# Patient Record
Sex: FEMALE | Race: WHITE | NOT HISPANIC OR LATINO | Employment: OTHER | URBAN - METROPOLITAN AREA
[De-identification: names, ages, dates, MRNs, and addresses within clinical notes are randomized per-mention and may not be internally consistent; named-entity substitution may affect disease eponyms.]

---

## 2017-01-21 ENCOUNTER — GENERIC CONVERSION - ENCOUNTER (OUTPATIENT)
Dept: OTHER | Facility: OTHER | Age: 82
End: 2017-01-21

## 2017-02-04 ENCOUNTER — HOSPITAL ENCOUNTER (INPATIENT)
Facility: HOSPITAL | Age: 82
LOS: 9 days | Discharge: HOME HEALTH | DRG: 327 | End: 2017-02-13
Attending: SURGERY | Admitting: SURGERY
Payer: MEDICARE

## 2017-02-04 ENCOUNTER — APPOINTMENT (EMERGENCY)
Dept: CT IMAGING | Facility: HOSPITAL | Age: 82
DRG: 327 | End: 2017-02-04
Payer: MEDICARE

## 2017-02-04 ENCOUNTER — APPOINTMENT (EMERGENCY)
Dept: RADIOLOGY | Facility: HOSPITAL | Age: 82
DRG: 327 | End: 2017-02-04
Payer: MEDICARE

## 2017-02-04 ENCOUNTER — GENERIC CONVERSION - ENCOUNTER (OUTPATIENT)
Dept: OTHER | Facility: OTHER | Age: 82
End: 2017-02-04

## 2017-02-04 ENCOUNTER — APPOINTMENT (INPATIENT)
Dept: RADIOLOGY | Facility: HOSPITAL | Age: 82
DRG: 327 | End: 2017-02-04
Payer: MEDICARE

## 2017-02-04 DIAGNOSIS — K44.9 DIAPHRAGMATIC HERNIA: ICD-10-CM

## 2017-02-04 DIAGNOSIS — K31.89 GASTRIC VOLVULUS: ICD-10-CM

## 2017-02-04 DIAGNOSIS — K44.9 PARAESOPHAGEAL HERNIA: ICD-10-CM

## 2017-02-04 DIAGNOSIS — I10 ESSENTIAL HYPERTENSION: Primary | ICD-10-CM

## 2017-02-04 DIAGNOSIS — R10.9 ABDOMINAL PAIN: ICD-10-CM

## 2017-02-04 LAB
ANION GAP SERPL CALCULATED.3IONS-SCNC: 9 MMOL/L (ref 4–13)
BASOPHILS # BLD AUTO: 0.03 THOUSANDS/ΜL (ref 0–0.1)
BASOPHILS NFR BLD AUTO: 0 % (ref 0–1)
BUN SERPL-MCNC: 24 MG/DL (ref 5–25)
CALCIUM SERPL-MCNC: 9.4 MG/DL (ref 8.3–10.1)
CHLORIDE SERPL-SCNC: 104 MMOL/L (ref 100–108)
CO2 SERPL-SCNC: 28 MMOL/L (ref 21–32)
CREAT SERPL-MCNC: 1.43 MG/DL (ref 0.6–1.3)
EOSINOPHIL # BLD AUTO: 0.04 THOUSAND/ΜL (ref 0–0.61)
EOSINOPHIL NFR BLD AUTO: 1 % (ref 0–6)
ERYTHROCYTE [DISTWIDTH] IN BLOOD BY AUTOMATED COUNT: 14.8 % (ref 11.6–15.1)
FLUAV AG SPEC QL IA: NEGATIVE
FLUBV AG SPEC QL IA: NEGATIVE
GFR SERPL CREATININE-BSD FRML MDRD: 34.6 ML/MIN/1.73SQ M
GLUCOSE SERPL-MCNC: 119 MG/DL (ref 65–140)
HCT VFR BLD AUTO: 43.7 % (ref 34.8–46.1)
HGB BLD-MCNC: 14.1 G/DL (ref 11.5–15.4)
LACTATE SERPL-SCNC: 0.9 MMOL/L (ref 0.5–2)
LYMPHOCYTES # BLD AUTO: 0.68 THOUSANDS/ΜL (ref 0.6–4.47)
LYMPHOCYTES NFR BLD AUTO: 9 % (ref 14–44)
MCH RBC QN AUTO: 29.4 PG (ref 26.8–34.3)
MCHC RBC AUTO-ENTMCNC: 32.3 G/DL (ref 31.4–37.4)
MCV RBC AUTO: 91 FL (ref 82–98)
MONOCYTES # BLD AUTO: 0.51 THOUSAND/ΜL (ref 0.17–1.22)
MONOCYTES NFR BLD AUTO: 7 % (ref 4–12)
NEUTROPHILS # BLD AUTO: 6.51 THOUSANDS/ΜL (ref 1.85–7.62)
NEUTS SEG NFR BLD AUTO: 83 % (ref 43–75)
PLATELET # BLD AUTO: 184 THOUSANDS/UL (ref 149–390)
PLATELET # BLD AUTO: 196 THOUSANDS/UL (ref 149–390)
PMV BLD AUTO: 10 FL (ref 8.9–12.7)
PMV BLD AUTO: 9.8 FL (ref 8.9–12.7)
POTASSIUM SERPL-SCNC: 3.9 MMOL/L (ref 3.5–5.3)
RBC # BLD AUTO: 4.8 MILLION/UL (ref 3.81–5.12)
SODIUM SERPL-SCNC: 141 MMOL/L (ref 136–145)
TROPONIN I SERPL-MCNC: <0.02 NG/ML
WBC # BLD AUTO: 7.77 THOUSAND/UL (ref 4.31–10.16)

## 2017-02-04 PROCEDURE — 74176 CT ABD & PELVIS W/O CONTRAST: CPT

## 2017-02-04 PROCEDURE — 85049 AUTOMATED PLATELET COUNT: CPT | Performed by: SURGERY

## 2017-02-04 PROCEDURE — 93005 ELECTROCARDIOGRAM TRACING: CPT | Performed by: PHYSICIAN ASSISTANT

## 2017-02-04 PROCEDURE — 36415 COLL VENOUS BLD VENIPUNCTURE: CPT | Performed by: PHYSICIAN ASSISTANT

## 2017-02-04 PROCEDURE — 83605 ASSAY OF LACTIC ACID: CPT | Performed by: EMERGENCY MEDICINE

## 2017-02-04 PROCEDURE — 99285 EMERGENCY DEPT VISIT HI MDM: CPT

## 2017-02-04 PROCEDURE — 87400 INFLUENZA A/B EACH AG IA: CPT | Performed by: PHYSICIAN ASSISTANT

## 2017-02-04 PROCEDURE — 96375 TX/PRO/DX INJ NEW DRUG ADDON: CPT

## 2017-02-04 PROCEDURE — 74000 HB X-RAY EXAM OF ABDOMEN (SINGLE ANTEROPOSTERIOR VIEW): CPT

## 2017-02-04 PROCEDURE — 96361 HYDRATE IV INFUSION ADD-ON: CPT

## 2017-02-04 PROCEDURE — 71020 HB CHEST X-RAY 2VW FRONTAL&LATL: CPT

## 2017-02-04 PROCEDURE — 85025 COMPLETE CBC W/AUTO DIFF WBC: CPT | Performed by: PHYSICIAN ASSISTANT

## 2017-02-04 PROCEDURE — 70450 CT HEAD/BRAIN W/O DYE: CPT

## 2017-02-04 PROCEDURE — 80048 BASIC METABOLIC PNL TOTAL CA: CPT | Performed by: PHYSICIAN ASSISTANT

## 2017-02-04 PROCEDURE — 96374 THER/PROPH/DIAG INJ IV PUSH: CPT

## 2017-02-04 PROCEDURE — 84484 ASSAY OF TROPONIN QUANT: CPT | Performed by: PHYSICIAN ASSISTANT

## 2017-02-04 PROCEDURE — 87798 DETECT AGENT NOS DNA AMP: CPT | Performed by: PHYSICIAN ASSISTANT

## 2017-02-04 RX ORDER — ONDANSETRON 2 MG/ML
INJECTION INTRAMUSCULAR; INTRAVENOUS
Status: DISPENSED
Start: 2017-02-04 | End: 2017-02-05

## 2017-02-04 RX ORDER — PANTOPRAZOLE SODIUM 40 MG/1
40 INJECTION, POWDER, FOR SOLUTION INTRAVENOUS
Status: DISCONTINUED | OUTPATIENT
Start: 2017-02-05 | End: 2017-02-13 | Stop reason: HOSPADM

## 2017-02-04 RX ORDER — HEPARIN SODIUM 5000 [USP'U]/ML
5000 INJECTION, SOLUTION INTRAVENOUS; SUBCUTANEOUS EVERY 8 HOURS SCHEDULED
Status: DISCONTINUED | OUTPATIENT
Start: 2017-02-04 | End: 2017-02-13 | Stop reason: HOSPADM

## 2017-02-04 RX ORDER — ONDANSETRON 2 MG/ML
INJECTION INTRAMUSCULAR; INTRAVENOUS
Status: COMPLETED
Start: 2017-02-04 | End: 2017-02-04

## 2017-02-04 RX ORDER — ONDANSETRON HYDROCHLORIDE 4 MG/5ML
4 SOLUTION ORAL ONCE
Status: COMPLETED | OUTPATIENT
Start: 2017-02-04 | End: 2017-02-04

## 2017-02-04 RX ORDER — PANTOPRAZOLE SODIUM 40 MG/1
40 TABLET, DELAYED RELEASE ORAL DAILY
COMMUNITY
End: 2017-07-09 | Stop reason: HOSPADM

## 2017-02-04 RX ORDER — LABETALOL HYDROCHLORIDE 5 MG/ML
10 INJECTION, SOLUTION INTRAVENOUS EVERY 6 HOURS PRN
Status: DISCONTINUED | OUTPATIENT
Start: 2017-02-04 | End: 2017-02-05

## 2017-02-04 RX ORDER — MIDAZOLAM HYDROCHLORIDE 1 MG/ML
1 INJECTION INTRAMUSCULAR; INTRAVENOUS ONCE
Status: COMPLETED | OUTPATIENT
Start: 2017-02-04 | End: 2017-02-04

## 2017-02-04 RX ORDER — ONDANSETRON 2 MG/ML
4 INJECTION INTRAMUSCULAR; INTRAVENOUS EVERY 4 HOURS PRN
Status: DISCONTINUED | OUTPATIENT
Start: 2017-02-04 | End: 2017-02-13 | Stop reason: HOSPADM

## 2017-02-04 RX ORDER — ONDANSETRON 2 MG/ML
4 INJECTION INTRAMUSCULAR; INTRAVENOUS ONCE
Status: COMPLETED | OUTPATIENT
Start: 2017-02-04 | End: 2017-02-04

## 2017-02-04 RX ORDER — SODIUM CHLORIDE 9 MG/ML
125 INJECTION, SOLUTION INTRAVENOUS CONTINUOUS
Status: DISCONTINUED | OUTPATIENT
Start: 2017-02-04 | End: 2017-02-06

## 2017-02-04 RX ORDER — MORPHINE SULFATE 2 MG/ML
2 INJECTION, SOLUTION INTRAMUSCULAR; INTRAVENOUS ONCE
Status: COMPLETED | OUTPATIENT
Start: 2017-02-04 | End: 2017-02-04

## 2017-02-04 RX ADMIN — ONDANSETRON 4 MG: 4 SOLUTION ORAL at 17:45

## 2017-02-04 RX ADMIN — ONDANSETRON 4 MG: 4 SOLUTION ORAL at 20:19

## 2017-02-04 RX ADMIN — SODIUM CHLORIDE 250 ML: 0.9 INJECTION, SOLUTION INTRAVENOUS at 17:00

## 2017-02-04 RX ADMIN — HEPARIN SODIUM 5000 UNITS: 5000 INJECTION, SOLUTION INTRAVENOUS; SUBCUTANEOUS at 23:47

## 2017-02-04 RX ADMIN — MIDAZOLAM 1 MG: 1 INJECTION INTRAMUSCULAR; INTRAVENOUS at 21:10

## 2017-02-04 RX ADMIN — ONDANSETRON 4 MG: 2 INJECTION INTRAMUSCULAR; INTRAVENOUS at 16:57

## 2017-02-04 RX ADMIN — FAMOTIDINE 20 MG: 10 INJECTION, SOLUTION INTRAVENOUS at 17:08

## 2017-02-04 RX ADMIN — MORPHINE SULFATE 2 MG: 2 INJECTION, SOLUTION INTRAMUSCULAR; INTRAVENOUS at 20:22

## 2017-02-05 PROBLEM — N18.4 CKD (CHRONIC KIDNEY DISEASE) STAGE 4, GFR 15-29 ML/MIN (HCC): Chronic | Status: ACTIVE | Noted: 2017-02-05

## 2017-02-05 PROBLEM — I10 ESSENTIAL HYPERTENSION: Chronic | Status: ACTIVE | Noted: 2017-02-05

## 2017-02-05 PROBLEM — I50.9 CHF (CONGESTIVE HEART FAILURE) (HCC): Chronic | Status: ACTIVE | Noted: 2017-02-05

## 2017-02-05 PROBLEM — I48.91 ATRIAL FIBRILLATION (HCC): Chronic | Status: ACTIVE | Noted: 2017-02-05

## 2017-02-05 PROBLEM — K44.9 PARAESOPHAGEAL HERNIA: Status: ACTIVE | Noted: 2017-02-05

## 2017-02-05 LAB
ANION GAP SERPL CALCULATED.3IONS-SCNC: 8 MMOL/L (ref 4–13)
BASOPHILS # BLD AUTO: 0.03 THOUSANDS/ΜL (ref 0–0.1)
BASOPHILS NFR BLD AUTO: 1 % (ref 0–1)
BUN SERPL-MCNC: 23 MG/DL (ref 5–25)
CALCIUM SERPL-MCNC: 8.2 MG/DL (ref 8.3–10.1)
CHLORIDE SERPL-SCNC: 110 MMOL/L (ref 100–108)
CO2 SERPL-SCNC: 28 MMOL/L (ref 21–32)
CREAT SERPL-MCNC: 1.35 MG/DL (ref 0.6–1.3)
EOSINOPHIL # BLD AUTO: 0.05 THOUSAND/ΜL (ref 0–0.61)
EOSINOPHIL NFR BLD AUTO: 1 % (ref 0–6)
ERYTHROCYTE [DISTWIDTH] IN BLOOD BY AUTOMATED COUNT: 14.7 % (ref 11.6–15.1)
FLUAV AG SPEC QL: NORMAL
FLUBV AG SPEC QL: NORMAL
GFR SERPL CREATININE-BSD FRML MDRD: 36.9 ML/MIN/1.73SQ M
GLUCOSE SERPL-MCNC: 81 MG/DL (ref 65–140)
HCT VFR BLD AUTO: 37.7 % (ref 34.8–46.1)
HGB BLD-MCNC: 11.7 G/DL (ref 11.5–15.4)
LYMPHOCYTES # BLD AUTO: 0.75 THOUSANDS/ΜL (ref 0.6–4.47)
LYMPHOCYTES NFR BLD AUTO: 13 % (ref 14–44)
MAGNESIUM SERPL-MCNC: 2.2 MG/DL (ref 1.6–2.6)
MCH RBC QN AUTO: 28.9 PG (ref 26.8–34.3)
MCHC RBC AUTO-ENTMCNC: 31 G/DL (ref 31.4–37.4)
MCV RBC AUTO: 93 FL (ref 82–98)
MONOCYTES # BLD AUTO: 0.67 THOUSAND/ΜL (ref 0.17–1.22)
MONOCYTES NFR BLD AUTO: 11 % (ref 4–12)
NEUTROPHILS # BLD AUTO: 4.5 THOUSANDS/ΜL (ref 1.85–7.62)
NEUTS SEG NFR BLD AUTO: 74 % (ref 43–75)
PLATELET # BLD AUTO: 157 THOUSANDS/UL (ref 149–390)
PMV BLD AUTO: 9.8 FL (ref 8.9–12.7)
POTASSIUM SERPL-SCNC: 4 MMOL/L (ref 3.5–5.3)
RBC # BLD AUTO: 4.05 MILLION/UL (ref 3.81–5.12)
RSV B RNA SPEC QL NAA+PROBE: NORMAL
SODIUM SERPL-SCNC: 146 MMOL/L (ref 136–145)
WBC # BLD AUTO: 6 THOUSAND/UL (ref 4.31–10.16)

## 2017-02-05 PROCEDURE — 85025 COMPLETE CBC W/AUTO DIFF WBC: CPT | Performed by: SURGERY

## 2017-02-05 PROCEDURE — 80048 BASIC METABOLIC PNL TOTAL CA: CPT | Performed by: SURGERY

## 2017-02-05 PROCEDURE — 83735 ASSAY OF MAGNESIUM: CPT | Performed by: SURGERY

## 2017-02-05 PROCEDURE — C9113 INJ PANTOPRAZOLE SODIUM, VIA: HCPCS | Performed by: SURGERY

## 2017-02-05 RX ADMIN — HEPARIN SODIUM 5000 UNITS: 5000 INJECTION, SOLUTION INTRAVENOUS; SUBCUTANEOUS at 21:24

## 2017-02-05 RX ADMIN — METOPROLOL TARTRATE 5 MG: 5 INJECTION INTRAVENOUS at 17:06

## 2017-02-05 RX ADMIN — HEPARIN SODIUM 5000 UNITS: 5000 INJECTION, SOLUTION INTRAVENOUS; SUBCUTANEOUS at 05:31

## 2017-02-05 RX ADMIN — SODIUM CHLORIDE 125 ML/HR: 0.9 INJECTION, SOLUTION INTRAVENOUS at 23:54

## 2017-02-05 RX ADMIN — METOPROLOL TARTRATE 2.5 MG: 5 INJECTION INTRAVENOUS at 11:40

## 2017-02-05 RX ADMIN — METOPROLOL TARTRATE 5 MG: 5 INJECTION INTRAVENOUS at 21:24

## 2017-02-05 RX ADMIN — HEPARIN SODIUM 5000 UNITS: 5000 INJECTION, SOLUTION INTRAVENOUS; SUBCUTANEOUS at 14:12

## 2017-02-05 RX ADMIN — SODIUM CHLORIDE 125 ML/HR: 0.9 INJECTION, SOLUTION INTRAVENOUS at 15:42

## 2017-02-05 RX ADMIN — PANTOPRAZOLE SODIUM 40 MG: 40 INJECTION, POWDER, FOR SOLUTION INTRAVENOUS at 08:18

## 2017-02-06 ENCOUNTER — APPOINTMENT (INPATIENT)
Dept: RADIOLOGY | Facility: HOSPITAL | Age: 82
DRG: 327 | End: 2017-02-06
Payer: MEDICARE

## 2017-02-06 PROBLEM — E87.0 HYPERNATREMIA: Status: ACTIVE | Noted: 2017-02-06

## 2017-02-06 LAB
ANION GAP SERPL CALCULATED.3IONS-SCNC: 11 MMOL/L (ref 4–13)
BUN SERPL-MCNC: 24 MG/DL (ref 5–25)
CALCIUM SERPL-MCNC: 8.4 MG/DL (ref 8.3–10.1)
CHLORIDE SERPL-SCNC: 112 MMOL/L (ref 100–108)
CO2 SERPL-SCNC: 25 MMOL/L (ref 21–32)
CREAT SERPL-MCNC: 1.43 MG/DL (ref 0.6–1.3)
ERYTHROCYTE [DISTWIDTH] IN BLOOD BY AUTOMATED COUNT: 14.9 % (ref 11.6–15.1)
GFR SERPL CREATININE-BSD FRML MDRD: 34.6 ML/MIN/1.73SQ M
GLUCOSE SERPL-MCNC: 59 MG/DL (ref 65–140)
GLUCOSE SERPL-MCNC: 76 MG/DL (ref 65–140)
HCT VFR BLD AUTO: 39.8 % (ref 34.8–46.1)
HGB BLD-MCNC: 11.9 G/DL (ref 11.5–15.4)
MAGNESIUM SERPL-MCNC: 2.2 MG/DL (ref 1.6–2.6)
MCH RBC QN AUTO: 28.5 PG (ref 26.8–34.3)
MCHC RBC AUTO-ENTMCNC: 29.9 G/DL (ref 31.4–37.4)
MCV RBC AUTO: 95 FL (ref 82–98)
PLATELET # BLD AUTO: 160 THOUSANDS/UL (ref 149–390)
PMV BLD AUTO: 10 FL (ref 8.9–12.7)
POTASSIUM SERPL-SCNC: 4 MMOL/L (ref 3.5–5.3)
RBC # BLD AUTO: 4.17 MILLION/UL (ref 3.81–5.12)
SODIUM SERPL-SCNC: 148 MMOL/L (ref 136–145)
WBC # BLD AUTO: 6.78 THOUSAND/UL (ref 4.31–10.16)

## 2017-02-06 PROCEDURE — 85027 COMPLETE CBC AUTOMATED: CPT | Performed by: SURGERY

## 2017-02-06 PROCEDURE — 80048 BASIC METABOLIC PNL TOTAL CA: CPT | Performed by: SURGERY

## 2017-02-06 PROCEDURE — 74000 HB X-RAY EXAM OF ABDOMEN (SINGLE ANTEROPOSTERIOR VIEW): CPT

## 2017-02-06 PROCEDURE — C9113 INJ PANTOPRAZOLE SODIUM, VIA: HCPCS | Performed by: SURGERY

## 2017-02-06 PROCEDURE — 83735 ASSAY OF MAGNESIUM: CPT | Performed by: SURGERY

## 2017-02-06 PROCEDURE — 82948 REAGENT STRIP/BLOOD GLUCOSE: CPT

## 2017-02-06 RX ORDER — LANOLIN ALCOHOL/MO/W.PET/CERES
3 CREAM (GRAM) TOPICAL
Status: DISCONTINUED | OUTPATIENT
Start: 2017-02-06 | End: 2017-02-07

## 2017-02-06 RX ORDER — DEXTROSE AND SODIUM CHLORIDE 5; .45 G/100ML; G/100ML
84 INJECTION, SOLUTION INTRAVENOUS CONTINUOUS
Status: DISCONTINUED | OUTPATIENT
Start: 2017-02-06 | End: 2017-02-08

## 2017-02-06 RX ORDER — DEXTROSE AND SODIUM CHLORIDE 5; .45 G/100ML; G/100ML
100 INJECTION, SOLUTION INTRAVENOUS CONTINUOUS
Status: DISCONTINUED | OUTPATIENT
Start: 2017-02-06 | End: 2017-02-06

## 2017-02-06 RX ADMIN — MELATONIN TAB 3 MG 3 MG: 3 TAB at 23:26

## 2017-02-06 RX ADMIN — METOPROLOL TARTRATE 5 MG: 5 INJECTION INTRAVENOUS at 09:21

## 2017-02-06 RX ADMIN — SODIUM CHLORIDE 125 ML/HR: 0.9 INJECTION, SOLUTION INTRAVENOUS at 07:33

## 2017-02-06 RX ADMIN — HEPARIN SODIUM 5000 UNITS: 5000 INJECTION, SOLUTION INTRAVENOUS; SUBCUTANEOUS at 22:33

## 2017-02-06 RX ADMIN — METOPROLOL TARTRATE 5 MG: 5 INJECTION INTRAVENOUS at 16:13

## 2017-02-06 RX ADMIN — PANTOPRAZOLE SODIUM 40 MG: 40 INJECTION, POWDER, FOR SOLUTION INTRAVENOUS at 09:22

## 2017-02-06 RX ADMIN — HEPARIN SODIUM 5000 UNITS: 5000 INJECTION, SOLUTION INTRAVENOUS; SUBCUTANEOUS at 06:27

## 2017-02-06 RX ADMIN — METOPROLOL TARTRATE 5 MG: 5 INJECTION INTRAVENOUS at 04:15

## 2017-02-06 RX ADMIN — HEPARIN SODIUM 5000 UNITS: 5000 INJECTION, SOLUTION INTRAVENOUS; SUBCUTANEOUS at 13:06

## 2017-02-06 RX ADMIN — METOPROLOL TARTRATE 5 MG: 5 INJECTION INTRAVENOUS at 22:33

## 2017-02-06 RX ADMIN — DEXTROSE AND SODIUM CHLORIDE 100 ML/HR: 5; .45 INJECTION, SOLUTION INTRAVENOUS at 09:20

## 2017-02-07 PROBLEM — N18.30 CKD (CHRONIC KIDNEY DISEASE) STAGE 3, GFR 30-59 ML/MIN (HCC): Status: ACTIVE | Noted: 2017-02-05

## 2017-02-07 PROBLEM — E87.0 HYPERNATREMIA: Status: RESOLVED | Noted: 2017-02-06 | Resolved: 2017-02-07

## 2017-02-07 PROBLEM — E87.6 HYPOKALEMIA: Status: ACTIVE | Noted: 2017-02-07

## 2017-02-07 LAB
ANION GAP SERPL CALCULATED.3IONS-SCNC: 8 MMOL/L (ref 4–13)
ATRIAL RATE: 108 BPM
BASOPHILS # BLD AUTO: 0.02 THOUSANDS/ΜL (ref 0–0.1)
BASOPHILS NFR BLD AUTO: 0 % (ref 0–1)
BUN SERPL-MCNC: 22 MG/DL (ref 5–25)
CALCIUM SERPL-MCNC: 8.4 MG/DL (ref 8.3–10.1)
CHLORIDE SERPL-SCNC: 107 MMOL/L (ref 100–108)
CO2 SERPL-SCNC: 26 MMOL/L (ref 21–32)
CREAT SERPL-MCNC: 1.35 MG/DL (ref 0.6–1.3)
EOSINOPHIL # BLD AUTO: 0.1 THOUSAND/ΜL (ref 0–0.61)
EOSINOPHIL NFR BLD AUTO: 2 % (ref 0–6)
ERYTHROCYTE [DISTWIDTH] IN BLOOD BY AUTOMATED COUNT: 14.7 % (ref 11.6–15.1)
GFR SERPL CREATININE-BSD FRML MDRD: 36.9 ML/MIN/1.73SQ M
GLUCOSE SERPL-MCNC: 108 MG/DL (ref 65–140)
HCT VFR BLD AUTO: 38.6 % (ref 34.8–46.1)
HGB BLD-MCNC: 11.9 G/DL (ref 11.5–15.4)
LYMPHOCYTES # BLD AUTO: 0.53 THOUSANDS/ΜL (ref 0.6–4.47)
LYMPHOCYTES NFR BLD AUTO: 9 % (ref 14–44)
MAGNESIUM SERPL-MCNC: 2 MG/DL (ref 1.6–2.6)
MCH RBC QN AUTO: 29 PG (ref 26.8–34.3)
MCHC RBC AUTO-ENTMCNC: 30.8 G/DL (ref 31.4–37.4)
MCV RBC AUTO: 94 FL (ref 82–98)
MONOCYTES # BLD AUTO: 0.74 THOUSAND/ΜL (ref 0.17–1.22)
MONOCYTES NFR BLD AUTO: 12 % (ref 4–12)
NEUTROPHILS # BLD AUTO: 4.75 THOUSANDS/ΜL (ref 1.85–7.62)
NEUTS SEG NFR BLD AUTO: 77 % (ref 43–75)
P AXIS: 114 DEGREES
PLATELET # BLD AUTO: 145 THOUSANDS/UL (ref 149–390)
PMV BLD AUTO: 10.3 FL (ref 8.9–12.7)
POTASSIUM SERPL-SCNC: 3.3 MMOL/L (ref 3.5–5.3)
PR INTERVAL: 176 MS
QRS AXIS: -82 DEGREES
QRSD INTERVAL: 168 MS
QT INTERVAL: 470 MS
QTC INTERVAL: 492 MS
RBC # BLD AUTO: 4.11 MILLION/UL (ref 3.81–5.12)
SODIUM SERPL-SCNC: 141 MMOL/L (ref 136–145)
T WAVE AXIS: 83 DEGREES
VENTRICULAR RATE: 66 BPM
WBC # BLD AUTO: 6.14 THOUSAND/UL (ref 4.31–10.16)

## 2017-02-07 PROCEDURE — 80048 BASIC METABOLIC PNL TOTAL CA: CPT | Performed by: SURGERY

## 2017-02-07 PROCEDURE — 97162 PT EVAL MOD COMPLEX 30 MIN: CPT

## 2017-02-07 PROCEDURE — C9113 INJ PANTOPRAZOLE SODIUM, VIA: HCPCS | Performed by: SURGERY

## 2017-02-07 PROCEDURE — 83735 ASSAY OF MAGNESIUM: CPT | Performed by: SURGERY

## 2017-02-07 PROCEDURE — 85025 COMPLETE CBC W/AUTO DIFF WBC: CPT | Performed by: SURGERY

## 2017-02-07 PROCEDURE — G8978 MOBILITY CURRENT STATUS: HCPCS

## 2017-02-07 PROCEDURE — G8979 MOBILITY GOAL STATUS: HCPCS

## 2017-02-07 RX ORDER — HYDRALAZINE HYDROCHLORIDE 20 MG/ML
10 INJECTION INTRAMUSCULAR; INTRAVENOUS EVERY 6 HOURS PRN
Status: DISCONTINUED | OUTPATIENT
Start: 2017-02-07 | End: 2017-02-13 | Stop reason: HOSPADM

## 2017-02-07 RX ORDER — POTASSIUM CHLORIDE 14.9 MG/ML
20 INJECTION INTRAVENOUS
Status: COMPLETED | OUTPATIENT
Start: 2017-02-07 | End: 2017-02-07

## 2017-02-07 RX ADMIN — POTASSIUM CHLORIDE 20 MEQ: 200 INJECTION, SOLUTION INTRAVENOUS at 10:24

## 2017-02-07 RX ADMIN — METOPROLOL TARTRATE 5 MG: 5 INJECTION INTRAVENOUS at 22:22

## 2017-02-07 RX ADMIN — PANTOPRAZOLE SODIUM 40 MG: 40 INJECTION, POWDER, FOR SOLUTION INTRAVENOUS at 09:17

## 2017-02-07 RX ADMIN — HEPARIN SODIUM 5000 UNITS: 5000 INJECTION, SOLUTION INTRAVENOUS; SUBCUTANEOUS at 22:22

## 2017-02-07 RX ADMIN — METOPROLOL TARTRATE 5 MG: 5 INJECTION INTRAVENOUS at 16:49

## 2017-02-07 RX ADMIN — METOPROLOL TARTRATE 5 MG: 5 INJECTION INTRAVENOUS at 04:50

## 2017-02-07 RX ADMIN — DEXTROSE AND SODIUM CHLORIDE 84 ML/HR: 5; .45 INJECTION, SOLUTION INTRAVENOUS at 20:53

## 2017-02-07 RX ADMIN — HEPARIN SODIUM 5000 UNITS: 5000 INJECTION, SOLUTION INTRAVENOUS; SUBCUTANEOUS at 05:25

## 2017-02-07 RX ADMIN — POTASSIUM CHLORIDE 20 MEQ: 200 INJECTION, SOLUTION INTRAVENOUS at 13:55

## 2017-02-07 RX ADMIN — HEPARIN SODIUM 5000 UNITS: 5000 INJECTION, SOLUTION INTRAVENOUS; SUBCUTANEOUS at 13:44

## 2017-02-07 RX ADMIN — METOPROLOL TARTRATE 5 MG: 5 INJECTION INTRAVENOUS at 09:18

## 2017-02-07 RX ADMIN — DEXTROSE AND SODIUM CHLORIDE 84 ML/HR: 5; .45 INJECTION, SOLUTION INTRAVENOUS at 09:17

## 2017-02-08 ENCOUNTER — APPOINTMENT (INPATIENT)
Dept: RADIOLOGY | Facility: HOSPITAL | Age: 82
DRG: 327 | End: 2017-02-08
Payer: MEDICARE

## 2017-02-08 LAB
ANION GAP SERPL CALCULATED.3IONS-SCNC: 7 MMOL/L (ref 4–13)
BUN SERPL-MCNC: 16 MG/DL (ref 5–25)
CALCIUM SERPL-MCNC: 8.5 MG/DL (ref 8.3–10.1)
CHLORIDE SERPL-SCNC: 105 MMOL/L (ref 100–108)
CO2 SERPL-SCNC: 23 MMOL/L (ref 21–32)
CREAT SERPL-MCNC: 1.2 MG/DL (ref 0.6–1.3)
GFR SERPL CREATININE-BSD FRML MDRD: 42.3 ML/MIN/1.73SQ M
GLUCOSE SERPL-MCNC: 120 MG/DL (ref 65–140)
POTASSIUM SERPL-SCNC: 5 MMOL/L (ref 3.5–5.3)
SODIUM SERPL-SCNC: 135 MMOL/L (ref 136–145)

## 2017-02-08 PROCEDURE — 80048 BASIC METABOLIC PNL TOTAL CA: CPT | Performed by: PHYSICIAN ASSISTANT

## 2017-02-08 PROCEDURE — 74022 RADEX COMPL AQT ABD SERIES: CPT

## 2017-02-08 PROCEDURE — C9113 INJ PANTOPRAZOLE SODIUM, VIA: HCPCS | Performed by: SURGERY

## 2017-02-08 RX ORDER — FUROSEMIDE 40 MG/1
40 TABLET ORAL 3 TIMES WEEKLY
Status: DISCONTINUED | OUTPATIENT
Start: 2017-02-08 | End: 2017-02-09

## 2017-02-08 RX ORDER — AMLODIPINE BESYLATE 5 MG/1
5 TABLET ORAL DAILY
Status: DISCONTINUED | OUTPATIENT
Start: 2017-02-08 | End: 2017-02-09

## 2017-02-08 RX ORDER — SODIUM CHLORIDE 1000 MG
1 TABLET, SOLUBLE MISCELLANEOUS 2 TIMES DAILY WITH MEALS
Status: DISCONTINUED | OUTPATIENT
Start: 2017-02-08 | End: 2017-02-08

## 2017-02-08 RX ORDER — SODIUM CHLORIDE AND POTASSIUM CHLORIDE .9; .15 G/100ML; G/100ML
50 SOLUTION INTRAVENOUS CONTINUOUS
Status: DISCONTINUED | OUTPATIENT
Start: 2017-02-08 | End: 2017-02-11

## 2017-02-08 RX ADMIN — ONDANSETRON 4 MG: 2 INJECTION INTRAMUSCULAR; INTRAVENOUS at 12:19

## 2017-02-08 RX ADMIN — FUROSEMIDE 40 MG: 40 TABLET ORAL at 09:34

## 2017-02-08 RX ADMIN — HYDROMORPHONE HYDROCHLORIDE 0.25 MG: 1 INJECTION, SOLUTION INTRAMUSCULAR; INTRAVENOUS; SUBCUTANEOUS at 17:51

## 2017-02-08 RX ADMIN — SODIUM CHLORIDE AND POTASSIUM CHLORIDE 75 ML/HR: .9; .15 SOLUTION INTRAVENOUS at 16:53

## 2017-02-08 RX ADMIN — METOPROLOL TARTRATE 5 MG: 5 INJECTION INTRAVENOUS at 03:53

## 2017-02-08 RX ADMIN — METOPROLOL TARTRATE 25 MG: 25 TABLET ORAL at 09:34

## 2017-02-08 RX ADMIN — PANTOPRAZOLE SODIUM 40 MG: 40 INJECTION, POWDER, FOR SOLUTION INTRAVENOUS at 09:35

## 2017-02-08 RX ADMIN — HEPARIN SODIUM 5000 UNITS: 5000 INJECTION, SOLUTION INTRAVENOUS; SUBCUTANEOUS at 22:48

## 2017-02-08 RX ADMIN — ONDANSETRON 4 MG: 2 INJECTION INTRAMUSCULAR; INTRAVENOUS at 17:35

## 2017-02-08 RX ADMIN — AMLODIPINE BESYLATE 5 MG: 5 TABLET ORAL at 09:34

## 2017-02-08 RX ADMIN — HEPARIN SODIUM 5000 UNITS: 5000 INJECTION, SOLUTION INTRAVENOUS; SUBCUTANEOUS at 13:14

## 2017-02-08 RX ADMIN — HEPARIN SODIUM 5000 UNITS: 5000 INJECTION, SOLUTION INTRAVENOUS; SUBCUTANEOUS at 05:59

## 2017-02-09 ENCOUNTER — APPOINTMENT (INPATIENT)
Dept: RADIOLOGY | Facility: HOSPITAL | Age: 82
DRG: 327 | End: 2017-02-09
Payer: MEDICARE

## 2017-02-09 ENCOUNTER — APPOINTMENT (INPATIENT)
Dept: RADIOLOGY | Facility: HOSPITAL | Age: 82
DRG: 327 | End: 2017-02-09
Attending: SURGERY
Payer: MEDICARE

## 2017-02-09 ENCOUNTER — GENERIC CONVERSION - ENCOUNTER (OUTPATIENT)
Dept: OTHER | Facility: OTHER | Age: 82
End: 2017-02-09

## 2017-02-09 LAB
ABO GROUP BLD: NORMAL
ANION GAP SERPL CALCULATED.3IONS-SCNC: 9 MMOL/L (ref 4–13)
BASOPHILS # BLD AUTO: 0.01 THOUSANDS/ΜL (ref 0–0.1)
BASOPHILS NFR BLD AUTO: 0 % (ref 0–1)
BLD GP AB SCN SERPL QL: NEGATIVE
BUN SERPL-MCNC: 13 MG/DL (ref 5–25)
CALCIUM SERPL-MCNC: 8.4 MG/DL (ref 8.3–10.1)
CHLORIDE SERPL-SCNC: 107 MMOL/L (ref 100–108)
CO2 SERPL-SCNC: 25 MMOL/L (ref 21–32)
CREAT SERPL-MCNC: 1.26 MG/DL (ref 0.6–1.3)
EOSINOPHIL # BLD AUTO: 0.03 THOUSAND/ΜL (ref 0–0.61)
EOSINOPHIL NFR BLD AUTO: 1 % (ref 0–6)
ERYTHROCYTE [DISTWIDTH] IN BLOOD BY AUTOMATED COUNT: 14.6 % (ref 11.6–15.1)
GFR SERPL CREATININE-BSD FRML MDRD: 40 ML/MIN/1.73SQ M
GLUCOSE SERPL-MCNC: 77 MG/DL (ref 65–140)
HCT VFR BLD AUTO: 37.4 % (ref 34.8–46.1)
HGB BLD-MCNC: 11.8 G/DL (ref 11.5–15.4)
LYMPHOCYTES # BLD AUTO: 0.55 THOUSANDS/ΜL (ref 0.6–4.47)
LYMPHOCYTES NFR BLD AUTO: 10 % (ref 14–44)
MCH RBC QN AUTO: 29.1 PG (ref 26.8–34.3)
MCHC RBC AUTO-ENTMCNC: 31.6 G/DL (ref 31.4–37.4)
MCV RBC AUTO: 92 FL (ref 82–98)
MONOCYTES # BLD AUTO: 0.59 THOUSAND/ΜL (ref 0.17–1.22)
MONOCYTES NFR BLD AUTO: 11 % (ref 4–12)
NEUTROPHILS # BLD AUTO: 4.09 THOUSANDS/ΜL (ref 1.85–7.62)
NEUTS SEG NFR BLD AUTO: 78 % (ref 43–75)
PLATELET # BLD AUTO: 156 THOUSANDS/UL (ref 149–390)
PMV BLD AUTO: 10.4 FL (ref 8.9–12.7)
POTASSIUM SERPL-SCNC: 3.5 MMOL/L (ref 3.5–5.3)
RBC # BLD AUTO: 4.05 MILLION/UL (ref 3.81–5.12)
RH BLD: POSITIVE
SODIUM SERPL-SCNC: 141 MMOL/L (ref 136–145)
WBC # BLD AUTO: 5.27 THOUSAND/UL (ref 4.31–10.16)

## 2017-02-09 PROCEDURE — 74240 X-RAY XM UPR GI TRC 1CNTRST: CPT

## 2017-02-09 PROCEDURE — 97110 THERAPEUTIC EXERCISES: CPT

## 2017-02-09 PROCEDURE — 97116 GAIT TRAINING THERAPY: CPT

## 2017-02-09 PROCEDURE — 80048 BASIC METABOLIC PNL TOTAL CA: CPT | Performed by: PHYSICIAN ASSISTANT

## 2017-02-09 PROCEDURE — C1769 GUIDE WIRE: HCPCS

## 2017-02-09 PROCEDURE — 02HV33Z INSERTION OF INFUSION DEVICE INTO SUPERIOR VENA CAVA, PERCUTANEOUS APPROACH: ICD-10-PCS | Performed by: RADIOLOGY

## 2017-02-09 PROCEDURE — C1751 CATH, INF, PER/CENT/MIDLINE: HCPCS

## 2017-02-09 PROCEDURE — 76937 US GUIDE VASCULAR ACCESS: CPT

## 2017-02-09 PROCEDURE — 85025 COMPLETE CBC W/AUTO DIFF WBC: CPT | Performed by: SURGERY

## 2017-02-09 PROCEDURE — 86901 BLOOD TYPING SEROLOGIC RH(D): CPT | Performed by: SURGERY

## 2017-02-09 PROCEDURE — 86850 RBC ANTIBODY SCREEN: CPT | Performed by: SURGERY

## 2017-02-09 PROCEDURE — 36569 INSJ PICC 5 YR+ W/O IMAGING: CPT

## 2017-02-09 PROCEDURE — 86900 BLOOD TYPING SEROLOGIC ABO: CPT | Performed by: SURGERY

## 2017-02-09 PROCEDURE — C9113 INJ PANTOPRAZOLE SODIUM, VIA: HCPCS | Performed by: SURGERY

## 2017-02-09 PROCEDURE — 77001 FLUOROGUIDE FOR VEIN DEVICE: CPT

## 2017-02-09 RX ADMIN — CALCIUM GLUCONATE: 94 INJECTION, SOLUTION INTRAVENOUS at 21:44

## 2017-02-09 RX ADMIN — METOPROLOL TARTRATE 5 MG: 5 INJECTION INTRAVENOUS at 12:44

## 2017-02-09 RX ADMIN — SODIUM CHLORIDE AND POTASSIUM CHLORIDE 100 ML/HR: .9; .15 SOLUTION INTRAVENOUS at 04:05

## 2017-02-09 RX ADMIN — PANTOPRAZOLE SODIUM 40 MG: 40 INJECTION, POWDER, FOR SOLUTION INTRAVENOUS at 06:20

## 2017-02-09 RX ADMIN — METOPROLOL TARTRATE 5 MG: 5 INJECTION INTRAVENOUS at 23:39

## 2017-02-09 RX ADMIN — METOPROLOL TARTRATE 5 MG: 5 INJECTION INTRAVENOUS at 17:58

## 2017-02-09 RX ADMIN — HEPARIN SODIUM 5000 UNITS: 5000 INJECTION, SOLUTION INTRAVENOUS; SUBCUTANEOUS at 21:44

## 2017-02-09 RX ADMIN — HEPARIN SODIUM 5000 UNITS: 5000 INJECTION, SOLUTION INTRAVENOUS; SUBCUTANEOUS at 13:58

## 2017-02-09 RX ADMIN — HEPARIN SODIUM 5000 UNITS: 5000 INJECTION, SOLUTION INTRAVENOUS; SUBCUTANEOUS at 05:49

## 2017-02-10 ENCOUNTER — ANESTHESIA EVENT (INPATIENT)
Dept: GASTROENTEROLOGY | Facility: HOSPITAL | Age: 82
DRG: 327 | End: 2017-02-10
Payer: MEDICARE

## 2017-02-10 ENCOUNTER — GENERIC CONVERSION - ENCOUNTER (OUTPATIENT)
Dept: OTHER | Facility: OTHER | Age: 82
End: 2017-02-10

## 2017-02-10 ENCOUNTER — ANESTHESIA (INPATIENT)
Dept: GASTROENTEROLOGY | Facility: HOSPITAL | Age: 82
DRG: 327 | End: 2017-02-10
Payer: MEDICARE

## 2017-02-10 LAB
ANION GAP SERPL CALCULATED.3IONS-SCNC: 8 MMOL/L (ref 4–13)
BASOPHILS # BLD AUTO: 0.03 THOUSANDS/ΜL (ref 0–0.1)
BASOPHILS NFR BLD AUTO: 1 % (ref 0–1)
BUN SERPL-MCNC: 17 MG/DL (ref 5–25)
CALCIUM SERPL-MCNC: 8.3 MG/DL (ref 8.3–10.1)
CHLORIDE SERPL-SCNC: 109 MMOL/L (ref 100–108)
CO2 SERPL-SCNC: 28 MMOL/L (ref 21–32)
CREAT SERPL-MCNC: 1.34 MG/DL (ref 0.6–1.3)
EOSINOPHIL # BLD AUTO: 0.12 THOUSAND/ΜL (ref 0–0.61)
EOSINOPHIL NFR BLD AUTO: 3 % (ref 0–6)
ERYTHROCYTE [DISTWIDTH] IN BLOOD BY AUTOMATED COUNT: 14.9 % (ref 11.6–15.1)
GFR SERPL CREATININE-BSD FRML MDRD: 37.2 ML/MIN/1.73SQ M
GLUCOSE SERPL-MCNC: 95 MG/DL (ref 65–140)
HCT VFR BLD AUTO: 36.1 % (ref 34.8–46.1)
HGB BLD-MCNC: 11.1 G/DL (ref 11.5–15.4)
LYMPHOCYTES # BLD AUTO: 0.54 THOUSANDS/ΜL (ref 0.6–4.47)
LYMPHOCYTES NFR BLD AUTO: 11 % (ref 14–44)
MAGNESIUM SERPL-MCNC: 1.9 MG/DL (ref 1.6–2.6)
MCH RBC QN AUTO: 28.8 PG (ref 26.8–34.3)
MCHC RBC AUTO-ENTMCNC: 30.7 G/DL (ref 31.4–37.4)
MCV RBC AUTO: 94 FL (ref 82–98)
MONOCYTES # BLD AUTO: 0.56 THOUSAND/ΜL (ref 0.17–1.22)
MONOCYTES NFR BLD AUTO: 12 % (ref 4–12)
NEUTROPHILS # BLD AUTO: 3.6 THOUSANDS/ΜL (ref 1.85–7.62)
NEUTS SEG NFR BLD AUTO: 73 % (ref 43–75)
PHOSPHATE SERPL-MCNC: 3.7 MG/DL (ref 2.3–4.1)
PLATELET # BLD AUTO: 148 THOUSANDS/UL (ref 149–390)
PMV BLD AUTO: 10.1 FL (ref 8.9–12.7)
POTASSIUM SERPL-SCNC: 3.5 MMOL/L (ref 3.5–5.3)
RBC # BLD AUTO: 3.86 MILLION/UL (ref 3.81–5.12)
SODIUM SERPL-SCNC: 145 MMOL/L (ref 136–145)
WBC # BLD AUTO: 4.85 THOUSAND/UL (ref 4.31–10.16)

## 2017-02-10 PROCEDURE — 83735 ASSAY OF MAGNESIUM: CPT | Performed by: SURGERY

## 2017-02-10 PROCEDURE — 97166 OT EVAL MOD COMPLEX 45 MIN: CPT

## 2017-02-10 PROCEDURE — G8987 SELF CARE CURRENT STATUS: HCPCS

## 2017-02-10 PROCEDURE — 85025 COMPLETE CBC W/AUTO DIFF WBC: CPT | Performed by: SURGERY

## 2017-02-10 PROCEDURE — 84100 ASSAY OF PHOSPHORUS: CPT | Performed by: SURGERY

## 2017-02-10 PROCEDURE — 0DS68ZZ REPOSITION STOMACH, VIA NATURAL OR ARTIFICIAL OPENING ENDOSCOPIC: ICD-10-PCS | Performed by: INTERNAL MEDICINE

## 2017-02-10 PROCEDURE — C9113 INJ PANTOPRAZOLE SODIUM, VIA: HCPCS | Performed by: SURGERY

## 2017-02-10 PROCEDURE — G8988 SELF CARE GOAL STATUS: HCPCS

## 2017-02-10 PROCEDURE — 80048 BASIC METABOLIC PNL TOTAL CA: CPT | Performed by: SURGERY

## 2017-02-10 RX ORDER — SODIUM CHLORIDE, SODIUM LACTATE, POTASSIUM CHLORIDE, CALCIUM CHLORIDE 600; 310; 30; 20 MG/100ML; MG/100ML; MG/100ML; MG/100ML
INJECTION, SOLUTION INTRAVENOUS CONTINUOUS PRN
Status: DISCONTINUED | OUTPATIENT
Start: 2017-02-10 | End: 2017-02-10 | Stop reason: SURG

## 2017-02-10 RX ORDER — PROPOFOL 10 MG/ML
INJECTION, EMULSION INTRAVENOUS AS NEEDED
Status: DISCONTINUED | OUTPATIENT
Start: 2017-02-10 | End: 2017-02-10 | Stop reason: SURG

## 2017-02-10 RX ORDER — POTASSIUM CHLORIDE 14.9 MG/ML
20 INJECTION INTRAVENOUS ONCE
Status: COMPLETED | OUTPATIENT
Start: 2017-02-10 | End: 2017-02-10

## 2017-02-10 RX ORDER — FUROSEMIDE 10 MG/ML
20 INJECTION INTRAMUSCULAR; INTRAVENOUS ONCE
Status: COMPLETED | OUTPATIENT
Start: 2017-02-10 | End: 2017-02-10

## 2017-02-10 RX ADMIN — PROPOFOL 20 MG: 10 INJECTION, EMULSION INTRAVENOUS at 14:20

## 2017-02-10 RX ADMIN — HEPARIN SODIUM 5000 UNITS: 5000 INJECTION, SOLUTION INTRAVENOUS; SUBCUTANEOUS at 22:38

## 2017-02-10 RX ADMIN — SODIUM CHLORIDE AND POTASSIUM CHLORIDE 50 ML/HR: .9; .15 SOLUTION INTRAVENOUS at 02:02

## 2017-02-10 RX ADMIN — METOPROLOL TARTRATE 5 MG: 5 INJECTION INTRAVENOUS at 16:46

## 2017-02-10 RX ADMIN — METOPROLOL TARTRATE 5 MG: 5 INJECTION INTRAVENOUS at 05:37

## 2017-02-10 RX ADMIN — CALCIUM GLUCONATE: 94 INJECTION, SOLUTION INTRAVENOUS at 21:26

## 2017-02-10 RX ADMIN — PANTOPRAZOLE SODIUM 40 MG: 40 INJECTION, POWDER, FOR SOLUTION INTRAVENOUS at 08:05

## 2017-02-10 RX ADMIN — SODIUM CHLORIDE, SODIUM LACTATE, POTASSIUM CHLORIDE, AND CALCIUM CHLORIDE: .6; .31; .03; .02 INJECTION, SOLUTION INTRAVENOUS at 14:09

## 2017-02-10 RX ADMIN — SODIUM CHLORIDE AND POTASSIUM CHLORIDE 50 ML/HR: .9; .15 SOLUTION INTRAVENOUS at 22:39

## 2017-02-10 RX ADMIN — PROPOFOL 30 MG: 10 INJECTION, EMULSION INTRAVENOUS at 14:18

## 2017-02-10 RX ADMIN — POTASSIUM CHLORIDE 20 MEQ: 200 INJECTION, SOLUTION INTRAVENOUS at 11:35

## 2017-02-10 RX ADMIN — PROPOFOL 60 MG: 10 INJECTION, EMULSION INTRAVENOUS at 14:15

## 2017-02-10 RX ADMIN — METOPROLOL TARTRATE 5 MG: 5 INJECTION INTRAVENOUS at 11:35

## 2017-02-10 RX ADMIN — FUROSEMIDE 20 MG: 10 INJECTION, SOLUTION INTRAMUSCULAR; INTRAVENOUS at 11:35

## 2017-02-10 RX ADMIN — METOPROLOL TARTRATE 5 MG: 5 INJECTION INTRAVENOUS at 22:45

## 2017-02-11 LAB
ANION GAP SERPL CALCULATED.3IONS-SCNC: 10 MMOL/L (ref 4–13)
BUN SERPL-MCNC: 22 MG/DL (ref 5–25)
CALCIUM SERPL-MCNC: 8.4 MG/DL (ref 8.3–10.1)
CHLORIDE SERPL-SCNC: 105 MMOL/L (ref 100–108)
CO2 SERPL-SCNC: 26 MMOL/L (ref 21–32)
CREAT SERPL-MCNC: 1.28 MG/DL (ref 0.6–1.3)
GFR SERPL CREATININE-BSD FRML MDRD: 39.3 ML/MIN/1.73SQ M
GLUCOSE SERPL-MCNC: 145 MG/DL (ref 65–140)
POTASSIUM SERPL-SCNC: 3.8 MMOL/L (ref 3.5–5.3)
SODIUM SERPL-SCNC: 141 MMOL/L (ref 136–145)

## 2017-02-11 PROCEDURE — C9113 INJ PANTOPRAZOLE SODIUM, VIA: HCPCS | Performed by: SURGERY

## 2017-02-11 PROCEDURE — 80048 BASIC METABOLIC PNL TOTAL CA: CPT | Performed by: PHYSICIAN ASSISTANT

## 2017-02-11 RX ORDER — FUROSEMIDE 10 MG/ML
20 INJECTION INTRAMUSCULAR; INTRAVENOUS ONCE
Status: COMPLETED | OUTPATIENT
Start: 2017-02-11 | End: 2017-02-11

## 2017-02-11 RX ADMIN — FUROSEMIDE 20 MG: 10 INJECTION, SOLUTION INTRAMUSCULAR; INTRAVENOUS at 11:55

## 2017-02-11 RX ADMIN — METOPROLOL TARTRATE 5 MG: 5 INJECTION INTRAVENOUS at 05:53

## 2017-02-11 RX ADMIN — PANTOPRAZOLE SODIUM 40 MG: 40 INJECTION, POWDER, FOR SOLUTION INTRAVENOUS at 09:07

## 2017-02-11 RX ADMIN — METOPROLOL TARTRATE 5 MG: 5 INJECTION INTRAVENOUS at 11:54

## 2017-02-11 RX ADMIN — HEPARIN SODIUM 5000 UNITS: 5000 INJECTION, SOLUTION INTRAVENOUS; SUBCUTANEOUS at 05:53

## 2017-02-11 RX ADMIN — METOPROLOL TARTRATE 5 MG: 5 INJECTION INTRAVENOUS at 23:44

## 2017-02-11 RX ADMIN — HEPARIN SODIUM 5000 UNITS: 5000 INJECTION, SOLUTION INTRAVENOUS; SUBCUTANEOUS at 15:44

## 2017-02-11 RX ADMIN — HEPARIN SODIUM 5000 UNITS: 5000 INJECTION, SOLUTION INTRAVENOUS; SUBCUTANEOUS at 21:22

## 2017-02-11 RX ADMIN — CALCIUM GLUCONATE: 94 INJECTION, SOLUTION INTRAVENOUS at 21:23

## 2017-02-11 RX ADMIN — METOPROLOL TARTRATE 5 MG: 5 INJECTION INTRAVENOUS at 17:50

## 2017-02-12 PROCEDURE — 3E0336Z INTRODUCTION OF NUTRITIONAL SUBSTANCE INTO PERIPHERAL VEIN, PERCUTANEOUS APPROACH: ICD-10-PCS | Performed by: SURGERY

## 2017-02-12 PROCEDURE — C9113 INJ PANTOPRAZOLE SODIUM, VIA: HCPCS | Performed by: SURGERY

## 2017-02-12 RX ORDER — AMLODIPINE BESYLATE 5 MG/1
5 TABLET ORAL DAILY
Status: DISCONTINUED | OUTPATIENT
Start: 2017-02-12 | End: 2017-02-13 | Stop reason: HOSPADM

## 2017-02-12 RX ADMIN — HEPARIN SODIUM 5000 UNITS: 5000 INJECTION, SOLUTION INTRAVENOUS; SUBCUTANEOUS at 22:18

## 2017-02-12 RX ADMIN — HEPARIN SODIUM 5000 UNITS: 5000 INJECTION, SOLUTION INTRAVENOUS; SUBCUTANEOUS at 05:30

## 2017-02-12 RX ADMIN — AMLODIPINE BESYLATE 5 MG: 5 TABLET ORAL at 12:51

## 2017-02-12 RX ADMIN — METOPROLOL TARTRATE 5 MG: 5 INJECTION INTRAVENOUS at 05:30

## 2017-02-12 RX ADMIN — METOPROLOL TARTRATE 5 MG: 5 INJECTION INTRAVENOUS at 17:23

## 2017-02-12 RX ADMIN — HEPARIN SODIUM 5000 UNITS: 5000 INJECTION, SOLUTION INTRAVENOUS; SUBCUTANEOUS at 15:33

## 2017-02-12 RX ADMIN — METOPROLOL TARTRATE 5 MG: 5 INJECTION INTRAVENOUS at 12:51

## 2017-02-12 RX ADMIN — PANTOPRAZOLE SODIUM 40 MG: 40 INJECTION, POWDER, FOR SOLUTION INTRAVENOUS at 09:14

## 2017-02-12 RX ADMIN — CALCIUM GLUCONATE: 94 INJECTION, SOLUTION INTRAVENOUS at 22:14

## 2017-02-13 VITALS
DIASTOLIC BLOOD PRESSURE: 63 MMHG | RESPIRATION RATE: 18 BRPM | SYSTOLIC BLOOD PRESSURE: 139 MMHG | WEIGHT: 162.99 LBS | BODY MASS INDEX: 32.86 KG/M2 | HEART RATE: 62 BPM | TEMPERATURE: 97.8 F | OXYGEN SATURATION: 95 % | HEIGHT: 59 IN

## 2017-02-13 LAB
ANION GAP SERPL CALCULATED.3IONS-SCNC: 8 MMOL/L (ref 4–13)
BUN SERPL-MCNC: 33 MG/DL (ref 5–25)
CALCIUM SERPL-MCNC: 8.2 MG/DL (ref 8.3–10.1)
CHLORIDE SERPL-SCNC: 106 MMOL/L (ref 100–108)
CO2 SERPL-SCNC: 25 MMOL/L (ref 21–32)
CREAT SERPL-MCNC: 1.18 MG/DL (ref 0.6–1.3)
ERYTHROCYTE [DISTWIDTH] IN BLOOD BY AUTOMATED COUNT: 15.2 % (ref 11.6–15.1)
GFR SERPL CREATININE-BSD FRML MDRD: 43.1 ML/MIN/1.73SQ M
GLUCOSE SERPL-MCNC: 103 MG/DL (ref 65–140)
HCT VFR BLD AUTO: 36.2 % (ref 34.8–46.1)
HGB BLD-MCNC: 11.1 G/DL (ref 11.5–15.4)
MAGNESIUM SERPL-MCNC: 1.9 MG/DL (ref 1.6–2.6)
MCH RBC QN AUTO: 28.5 PG (ref 26.8–34.3)
MCHC RBC AUTO-ENTMCNC: 30.7 G/DL (ref 31.4–37.4)
MCV RBC AUTO: 93 FL (ref 82–98)
PLATELET # BLD AUTO: 134 THOUSANDS/UL (ref 149–390)
PMV BLD AUTO: 11 FL (ref 8.9–12.7)
POTASSIUM SERPL-SCNC: 4.1 MMOL/L (ref 3.5–5.3)
RBC # BLD AUTO: 3.89 MILLION/UL (ref 3.81–5.12)
SODIUM SERPL-SCNC: 139 MMOL/L (ref 136–145)
WBC # BLD AUTO: 7.19 THOUSAND/UL (ref 4.31–10.16)

## 2017-02-13 PROCEDURE — 85027 COMPLETE CBC AUTOMATED: CPT | Performed by: PHYSICIAN ASSISTANT

## 2017-02-13 PROCEDURE — C9113 INJ PANTOPRAZOLE SODIUM, VIA: HCPCS | Performed by: SURGERY

## 2017-02-13 PROCEDURE — 83735 ASSAY OF MAGNESIUM: CPT | Performed by: PHYSICIAN ASSISTANT

## 2017-02-13 PROCEDURE — 80048 BASIC METABOLIC PNL TOTAL CA: CPT | Performed by: PHYSICIAN ASSISTANT

## 2017-02-13 RX ADMIN — HEPARIN SODIUM 5000 UNITS: 5000 INJECTION, SOLUTION INTRAVENOUS; SUBCUTANEOUS at 06:40

## 2017-02-13 RX ADMIN — METOPROLOL TARTRATE 5 MG: 5 INJECTION INTRAVENOUS at 12:10

## 2017-02-13 RX ADMIN — METOPROLOL TARTRATE 5 MG: 5 INJECTION INTRAVENOUS at 00:12

## 2017-02-13 RX ADMIN — PANTOPRAZOLE SODIUM 40 MG: 40 INJECTION, POWDER, FOR SOLUTION INTRAVENOUS at 09:12

## 2017-02-13 RX ADMIN — HEPARIN SODIUM 5000 UNITS: 5000 INJECTION, SOLUTION INTRAVENOUS; SUBCUTANEOUS at 13:00

## 2017-02-13 RX ADMIN — AMLODIPINE BESYLATE 5 MG: 5 TABLET ORAL at 09:12

## 2017-02-13 RX ADMIN — METOPROLOL TARTRATE 5 MG: 5 INJECTION INTRAVENOUS at 17:50

## 2017-02-13 RX ADMIN — METOPROLOL TARTRATE 5 MG: 5 INJECTION INTRAVENOUS at 06:40

## 2017-02-24 ENCOUNTER — ALLSCRIPTS OFFICE VISIT (OUTPATIENT)
Dept: OTHER | Facility: OTHER | Age: 82
End: 2017-02-24

## 2017-02-24 DIAGNOSIS — D64.9 ANEMIA: ICD-10-CM

## 2017-03-01 ENCOUNTER — GENERIC CONVERSION - ENCOUNTER (OUTPATIENT)
Dept: OTHER | Facility: OTHER | Age: 82
End: 2017-03-01

## 2017-03-09 ENCOUNTER — ALLSCRIPTS OFFICE VISIT (OUTPATIENT)
Dept: OTHER | Facility: OTHER | Age: 82
End: 2017-03-09

## 2017-03-31 ENCOUNTER — APPOINTMENT (EMERGENCY)
Dept: CT IMAGING | Facility: HOSPITAL | Age: 82
DRG: 327 | End: 2017-03-31
Payer: MEDICARE

## 2017-03-31 ENCOUNTER — HOSPITAL ENCOUNTER (INPATIENT)
Facility: HOSPITAL | Age: 82
LOS: 5 days | Discharge: NURSING, REHAB OR LONG-TERM CARE | DRG: 327 | End: 2017-04-05
Attending: EMERGENCY MEDICINE | Admitting: SURGERY
Payer: MEDICARE

## 2017-03-31 DIAGNOSIS — K31.89 GASTRIC VOLVULUS: ICD-10-CM

## 2017-03-31 DIAGNOSIS — I48.91 ATRIAL FIBRILLATION, UNSPECIFIED TYPE (HCC): Chronic | ICD-10-CM

## 2017-03-31 DIAGNOSIS — I10 ESSENTIAL HYPERTENSION WITH GOAL BLOOD PRESSURE LESS THAN 140/90: Primary | Chronic | ICD-10-CM

## 2017-03-31 DIAGNOSIS — Z01.818 PREOPERATIVE CLEARANCE: ICD-10-CM

## 2017-03-31 DIAGNOSIS — I48.20 CHRONIC ATRIAL FIBRILLATION (HCC): Chronic | ICD-10-CM

## 2017-03-31 DIAGNOSIS — Z95.0 PACEMAKER: ICD-10-CM

## 2017-03-31 DIAGNOSIS — I50.9 CHF (CONGESTIVE HEART FAILURE) (HCC): Chronic | ICD-10-CM

## 2017-03-31 DIAGNOSIS — K44.0 PARAESOPHAGEAL HERNIA WITH OBSTRUCTION BUT NO GANGRENE: ICD-10-CM

## 2017-03-31 LAB
ALBUMIN SERPL BCP-MCNC: 3.9 G/DL (ref 3.5–5)
ALP SERPL-CCNC: 135 U/L (ref 46–116)
ALT SERPL W P-5'-P-CCNC: 23 U/L (ref 12–78)
ANION GAP SERPL CALCULATED.3IONS-SCNC: 7 MMOL/L (ref 4–13)
AST SERPL W P-5'-P-CCNC: 23 U/L (ref 5–45)
BASOPHILS # BLD AUTO: 0.01 THOUSANDS/ΜL (ref 0–0.1)
BASOPHILS NFR BLD AUTO: 0 % (ref 0–1)
BILIRUB SERPL-MCNC: 1 MG/DL (ref 0.2–1)
BUN SERPL-MCNC: 19 MG/DL (ref 5–25)
CALCIUM SERPL-MCNC: 9.5 MG/DL (ref 8.3–10.1)
CHLORIDE SERPL-SCNC: 98 MMOL/L (ref 100–108)
CO2 SERPL-SCNC: 36 MMOL/L (ref 21–32)
CREAT SERPL-MCNC: 1.44 MG/DL (ref 0.6–1.3)
EOSINOPHIL # BLD AUTO: 0.02 THOUSAND/ΜL (ref 0–0.61)
EOSINOPHIL NFR BLD AUTO: 0 % (ref 0–6)
ERYTHROCYTE [DISTWIDTH] IN BLOOD BY AUTOMATED COUNT: 14.9 % (ref 11.6–15.1)
GFR SERPL CREATININE-BSD FRML MDRD: 34.3 ML/MIN/1.73SQ M
GLUCOSE SERPL-MCNC: 110 MG/DL (ref 65–140)
HCT VFR BLD AUTO: 46.2 % (ref 34.8–46.1)
HGB BLD-MCNC: 15.1 G/DL (ref 11.5–15.4)
LACTATE SERPL-SCNC: 1.8 MMOL/L (ref 0.5–2)
LIPASE SERPL-CCNC: 105 U/L (ref 73–393)
LYMPHOCYTES # BLD AUTO: 0.67 THOUSANDS/ΜL (ref 0.6–4.47)
LYMPHOCYTES NFR BLD AUTO: 9 % (ref 14–44)
MCH RBC QN AUTO: 29.5 PG (ref 26.8–34.3)
MCHC RBC AUTO-ENTMCNC: 32.7 G/DL (ref 31.4–37.4)
MCV RBC AUTO: 90 FL (ref 82–98)
MONOCYTES # BLD AUTO: 0.56 THOUSAND/ΜL (ref 0.17–1.22)
MONOCYTES NFR BLD AUTO: 8 % (ref 4–12)
NEUTROPHILS # BLD AUTO: 5.93 THOUSANDS/ΜL (ref 1.85–7.62)
NEUTS SEG NFR BLD AUTO: 83 % (ref 43–75)
PLATELET # BLD AUTO: 207 THOUSANDS/UL (ref 149–390)
PMV BLD AUTO: 10 FL (ref 8.9–12.7)
POTASSIUM SERPL-SCNC: 3 MMOL/L (ref 3.5–5.3)
PROT SERPL-MCNC: 8.2 G/DL (ref 6.4–8.2)
RBC # BLD AUTO: 5.12 MILLION/UL (ref 3.81–5.12)
SODIUM SERPL-SCNC: 141 MMOL/L (ref 136–145)
WBC # BLD AUTO: 7.19 THOUSAND/UL (ref 4.31–10.16)

## 2017-03-31 PROCEDURE — 36415 COLL VENOUS BLD VENIPUNCTURE: CPT | Performed by: PHYSICIAN ASSISTANT

## 2017-03-31 PROCEDURE — 96360 HYDRATION IV INFUSION INIT: CPT

## 2017-03-31 PROCEDURE — 93005 ELECTROCARDIOGRAM TRACING: CPT | Performed by: PHYSICIAN ASSISTANT

## 2017-03-31 PROCEDURE — 74176 CT ABD & PELVIS W/O CONTRAST: CPT

## 2017-03-31 PROCEDURE — 99285 EMERGENCY DEPT VISIT HI MDM: CPT

## 2017-03-31 PROCEDURE — 83690 ASSAY OF LIPASE: CPT | Performed by: PHYSICIAN ASSISTANT

## 2017-03-31 PROCEDURE — 80053 COMPREHEN METABOLIC PANEL: CPT | Performed by: PHYSICIAN ASSISTANT

## 2017-03-31 PROCEDURE — 85025 COMPLETE CBC W/AUTO DIFF WBC: CPT | Performed by: PHYSICIAN ASSISTANT

## 2017-03-31 PROCEDURE — 83605 ASSAY OF LACTIC ACID: CPT | Performed by: PHYSICIAN ASSISTANT

## 2017-03-31 RX ORDER — SODIUM CHLORIDE AND POTASSIUM CHLORIDE .9; .15 G/100ML; G/100ML
125 SOLUTION INTRAVENOUS CONTINUOUS
Status: DISCONTINUED | OUTPATIENT
Start: 2017-04-01 | End: 2017-04-02

## 2017-03-31 RX ORDER — PANTOPRAZOLE SODIUM 40 MG/1
40 INJECTION, POWDER, FOR SOLUTION INTRAVENOUS EVERY 12 HOURS SCHEDULED
Status: DISCONTINUED | OUTPATIENT
Start: 2017-04-01 | End: 2017-04-05 | Stop reason: HOSPADM

## 2017-03-31 RX ORDER — MORPHINE SULFATE 2 MG/ML
2 INJECTION, SOLUTION INTRAMUSCULAR; INTRAVENOUS EVERY 4 HOURS PRN
Status: DISCONTINUED | OUTPATIENT
Start: 2017-03-31 | End: 2017-04-01

## 2017-03-31 RX ORDER — HEPARIN SODIUM 5000 [USP'U]/ML
5000 INJECTION, SOLUTION INTRAVENOUS; SUBCUTANEOUS EVERY 8 HOURS SCHEDULED
Status: DISCONTINUED | OUTPATIENT
Start: 2017-04-01 | End: 2017-04-04

## 2017-03-31 RX ORDER — ONDANSETRON 2 MG/ML
4 INJECTION INTRAMUSCULAR; INTRAVENOUS EVERY 4 HOURS PRN
Status: DISCONTINUED | OUTPATIENT
Start: 2017-03-31 | End: 2017-04-05 | Stop reason: HOSPADM

## 2017-03-31 RX ORDER — SODIUM CHLORIDE 9 MG/ML
125 INJECTION, SOLUTION INTRAVENOUS CONTINUOUS
Status: DISCONTINUED | OUTPATIENT
Start: 2017-04-01 | End: 2017-04-01

## 2017-03-31 RX ADMIN — SODIUM CHLORIDE 500 ML: 0.9 INJECTION, SOLUTION INTRAVENOUS at 18:58

## 2017-03-31 RX ADMIN — TOPICAL ANESTHETIC 2 SPRAY: 200 SPRAY DENTAL; PERIODONTAL at 19:15

## 2017-04-01 ENCOUNTER — GENERIC CONVERSION - ENCOUNTER (OUTPATIENT)
Dept: OTHER | Facility: OTHER | Age: 82
End: 2017-04-01

## 2017-04-01 ENCOUNTER — ANESTHESIA EVENT (OUTPATIENT)
Dept: PERIOP | Facility: HOSPITAL | Age: 82
End: 2017-04-01

## 2017-04-01 PROBLEM — K44.0 PARAESOPHAGEAL HERNIA WITH OBSTRUCTION BUT NO GANGRENE: Status: ACTIVE | Noted: 2017-02-05

## 2017-04-01 LAB
ABO GROUP BLD: NORMAL
ALBUMIN SERPL BCP-MCNC: 3.1 G/DL (ref 3.5–5)
ALP SERPL-CCNC: 107 U/L (ref 46–116)
ALT SERPL W P-5'-P-CCNC: 21 U/L (ref 12–78)
ANION GAP SERPL CALCULATED.3IONS-SCNC: 9 MMOL/L (ref 4–13)
AST SERPL W P-5'-P-CCNC: 21 U/L (ref 5–45)
ATRIAL RATE: 357 BPM
BILIRUB SERPL-MCNC: 1 MG/DL (ref 0.2–1)
BLD GP AB SCN SERPL QL: NEGATIVE
BUN SERPL-MCNC: 22 MG/DL (ref 5–25)
CALCIUM SERPL-MCNC: 8.5 MG/DL (ref 8.3–10.1)
CHLORIDE SERPL-SCNC: 104 MMOL/L (ref 100–108)
CO2 SERPL-SCNC: 32 MMOL/L (ref 21–32)
CREAT SERPL-MCNC: 1.43 MG/DL (ref 0.6–1.3)
GFR SERPL CREATININE-BSD FRML MDRD: 34.6 ML/MIN/1.73SQ M
GLUCOSE SERPL-MCNC: 68 MG/DL (ref 65–140)
MAGNESIUM SERPL-MCNC: 2.2 MG/DL (ref 1.6–2.6)
PLATELET # BLD AUTO: 195 THOUSANDS/UL (ref 149–390)
PMV BLD AUTO: 9.6 FL (ref 8.9–12.7)
POTASSIUM SERPL-SCNC: 3 MMOL/L (ref 3.5–5.3)
PROT SERPL-MCNC: 6.6 G/DL (ref 6.4–8.2)
QRS AXIS: -80 DEGREES
QRSD INTERVAL: 182 MS
QT INTERVAL: 526 MS
QTC INTERVAL: 529 MS
RH BLD: POSITIVE
SODIUM SERPL-SCNC: 145 MMOL/L (ref 136–145)
T WAVE AXIS: 82 DEGREES
VENTRICULAR RATE: 61 BPM

## 2017-04-01 PROCEDURE — 86901 BLOOD TYPING SEROLOGIC RH(D): CPT | Performed by: SURGERY

## 2017-04-01 PROCEDURE — C9113 INJ PANTOPRAZOLE SODIUM, VIA: HCPCS | Performed by: PHYSICIAN ASSISTANT

## 2017-04-01 PROCEDURE — 86900 BLOOD TYPING SEROLOGIC ABO: CPT | Performed by: SURGERY

## 2017-04-01 PROCEDURE — 85049 AUTOMATED PLATELET COUNT: CPT | Performed by: PHYSICIAN ASSISTANT

## 2017-04-01 PROCEDURE — 83735 ASSAY OF MAGNESIUM: CPT | Performed by: PHYSICIAN ASSISTANT

## 2017-04-01 PROCEDURE — 80053 COMPREHEN METABOLIC PANEL: CPT | Performed by: PHYSICIAN ASSISTANT

## 2017-04-01 PROCEDURE — 86850 RBC ANTIBODY SCREEN: CPT | Performed by: SURGERY

## 2017-04-01 RX ORDER — POTASSIUM CHLORIDE 14.9 MG/ML
20 INJECTION INTRAVENOUS
Status: COMPLETED | OUTPATIENT
Start: 2017-04-01 | End: 2017-04-01

## 2017-04-01 RX ORDER — LIDOCAINE HYDROCHLORIDE 20 MG/ML
JELLY TOPICAL ONCE
Status: DISCONTINUED | OUTPATIENT
Start: 2017-04-01 | End: 2017-04-05 | Stop reason: HOSPADM

## 2017-04-01 RX ADMIN — POTASSIUM CHLORIDE 20 MEQ: 200 INJECTION, SOLUTION INTRAVENOUS at 15:16

## 2017-04-01 RX ADMIN — METOPROLOL TARTRATE 2.5 MG: 5 INJECTION INTRAVENOUS at 11:04

## 2017-04-01 RX ADMIN — TOPICAL ANESTHETIC 2 SPRAY: 200 SPRAY DENTAL; PERIODONTAL at 22:37

## 2017-04-01 RX ADMIN — SODIUM CHLORIDE AND POTASSIUM CHLORIDE 125 ML/HR: .9; .15 SOLUTION INTRAVENOUS at 18:59

## 2017-04-01 RX ADMIN — SODIUM CHLORIDE AND POTASSIUM CHLORIDE 125 ML/HR: .9; .15 SOLUTION INTRAVENOUS at 11:03

## 2017-04-01 RX ADMIN — HEPARIN SODIUM 5000 UNITS: 5000 INJECTION, SOLUTION INTRAVENOUS; SUBCUTANEOUS at 14:16

## 2017-04-01 RX ADMIN — POTASSIUM CHLORIDE 20 MEQ: 200 INJECTION, SOLUTION INTRAVENOUS at 11:06

## 2017-04-01 RX ADMIN — PANTOPRAZOLE SODIUM 40 MG: 40 INJECTION, POWDER, FOR SOLUTION INTRAVENOUS at 21:23

## 2017-04-01 RX ADMIN — SODIUM CHLORIDE AND POTASSIUM CHLORIDE 125 ML/HR: .9; .15 SOLUTION INTRAVENOUS at 00:15

## 2017-04-01 RX ADMIN — PANTOPRAZOLE SODIUM 40 MG: 40 INJECTION, POWDER, FOR SOLUTION INTRAVENOUS at 08:41

## 2017-04-01 RX ADMIN — METOPROLOL TARTRATE 2.5 MG: 5 INJECTION INTRAVENOUS at 18:02

## 2017-04-01 RX ADMIN — Medication 1 SPRAY: at 20:55

## 2017-04-01 RX ADMIN — HEPARIN SODIUM 5000 UNITS: 5000 INJECTION, SOLUTION INTRAVENOUS; SUBCUTANEOUS at 21:23

## 2017-04-01 RX ADMIN — PANTOPRAZOLE SODIUM 40 MG: 40 INJECTION, POWDER, FOR SOLUTION INTRAVENOUS at 00:58

## 2017-04-02 ENCOUNTER — ANESTHESIA (INPATIENT)
Dept: PERIOP | Facility: HOSPITAL | Age: 82
DRG: 327 | End: 2017-04-02
Payer: MEDICARE

## 2017-04-02 ENCOUNTER — ANESTHESIA (OUTPATIENT)
Dept: PERIOP | Facility: HOSPITAL | Age: 82
End: 2017-04-02

## 2017-04-02 ENCOUNTER — ANESTHESIA EVENT (INPATIENT)
Dept: PERIOP | Facility: HOSPITAL | Age: 82
DRG: 327 | End: 2017-04-02
Payer: MEDICARE

## 2017-04-02 ENCOUNTER — GENERIC CONVERSION - ENCOUNTER (OUTPATIENT)
Dept: OTHER | Facility: OTHER | Age: 82
End: 2017-04-02

## 2017-04-02 PROBLEM — E16.2 HYPOGLYCEMIA: Status: ACTIVE | Noted: 2017-04-02

## 2017-04-02 LAB
ANION GAP SERPL CALCULATED.3IONS-SCNC: 13 MMOL/L (ref 4–13)
BASOPHILS # BLD AUTO: 0.03 THOUSANDS/ΜL (ref 0–0.1)
BASOPHILS NFR BLD AUTO: 1 % (ref 0–1)
BUN SERPL-MCNC: 25 MG/DL (ref 5–25)
CALCIUM SERPL-MCNC: 8.8 MG/DL (ref 8.3–10.1)
CHLORIDE SERPL-SCNC: 106 MMOL/L (ref 100–108)
CO2 SERPL-SCNC: 24 MMOL/L (ref 21–32)
CREAT SERPL-MCNC: 1.52 MG/DL (ref 0.6–1.3)
EOSINOPHIL # BLD AUTO: 0.07 THOUSAND/ΜL (ref 0–0.61)
EOSINOPHIL NFR BLD AUTO: 1 % (ref 0–6)
ERYTHROCYTE [DISTWIDTH] IN BLOOD BY AUTOMATED COUNT: 15.1 % (ref 11.6–15.1)
GFR SERPL CREATININE-BSD FRML MDRD: 32.2 ML/MIN/1.73SQ M
GLUCOSE SERPL-MCNC: 50 MG/DL (ref 65–140)
HCT VFR BLD AUTO: 39.6 % (ref 34.8–46.1)
HGB BLD-MCNC: 12.4 G/DL (ref 11.5–15.4)
LYMPHOCYTES # BLD AUTO: 0.87 THOUSANDS/ΜL (ref 0.6–4.47)
LYMPHOCYTES NFR BLD AUTO: 13 % (ref 14–44)
MAGNESIUM SERPL-MCNC: 2.2 MG/DL (ref 1.6–2.6)
MCH RBC QN AUTO: 29.6 PG (ref 26.8–34.3)
MCHC RBC AUTO-ENTMCNC: 31.3 G/DL (ref 31.4–37.4)
MCV RBC AUTO: 95 FL (ref 82–98)
MONOCYTES # BLD AUTO: 0.62 THOUSAND/ΜL (ref 0.17–1.22)
MONOCYTES NFR BLD AUTO: 10 % (ref 4–12)
NEUTROPHILS # BLD AUTO: 4.88 THOUSANDS/ΜL (ref 1.85–7.62)
NEUTS SEG NFR BLD AUTO: 75 % (ref 43–75)
PLATELET # BLD AUTO: 185 THOUSANDS/UL (ref 149–390)
PMV BLD AUTO: 10.3 FL (ref 8.9–12.7)
POTASSIUM SERPL-SCNC: 4.2 MMOL/L (ref 3.5–5.3)
RBC # BLD AUTO: 4.19 MILLION/UL (ref 3.81–5.12)
SODIUM SERPL-SCNC: 143 MMOL/L (ref 136–145)
WBC # BLD AUTO: 6.47 THOUSAND/UL (ref 4.31–10.16)

## 2017-04-02 PROCEDURE — 0WQF0ZZ REPAIR ABDOMINAL WALL, OPEN APPROACH: ICD-10-PCS | Performed by: SURGERY

## 2017-04-02 PROCEDURE — C9113 INJ PANTOPRAZOLE SODIUM, VIA: HCPCS | Performed by: PHYSICIAN ASSISTANT

## 2017-04-02 PROCEDURE — 83735 ASSAY OF MAGNESIUM: CPT | Performed by: SURGERY

## 2017-04-02 PROCEDURE — 80048 BASIC METABOLIC PNL TOTAL CA: CPT | Performed by: SURGERY

## 2017-04-02 PROCEDURE — 0BQS0ZZ: ICD-10-PCS | Performed by: SURGERY

## 2017-04-02 PROCEDURE — 85025 COMPLETE CBC W/AUTO DIFF WBC: CPT | Performed by: SURGERY

## 2017-04-02 PROCEDURE — 0DS60ZZ REPOSITION STOMACH, OPEN APPROACH: ICD-10-PCS | Performed by: SURGERY

## 2017-04-02 RX ORDER — PROPOFOL 10 MG/ML
INJECTION, EMULSION INTRAVENOUS AS NEEDED
Status: DISCONTINUED | OUTPATIENT
Start: 2017-04-02 | End: 2017-04-02 | Stop reason: SURG

## 2017-04-02 RX ORDER — SODIUM CHLORIDE, SODIUM LACTATE, POTASSIUM CHLORIDE, CALCIUM CHLORIDE 600; 310; 30; 20 MG/100ML; MG/100ML; MG/100ML; MG/100ML
INJECTION, SOLUTION INTRAVENOUS CONTINUOUS PRN
Status: DISCONTINUED | OUTPATIENT
Start: 2017-04-02 | End: 2017-04-02 | Stop reason: SURG

## 2017-04-02 RX ORDER — MAGNESIUM HYDROXIDE 1200 MG/15ML
LIQUID ORAL AS NEEDED
Status: DISCONTINUED | OUTPATIENT
Start: 2017-04-02 | End: 2017-04-02 | Stop reason: HOSPADM

## 2017-04-02 RX ORDER — LABETALOL HYDROCHLORIDE 5 MG/ML
INJECTION, SOLUTION INTRAVENOUS AS NEEDED
Status: DISCONTINUED | OUTPATIENT
Start: 2017-04-02 | End: 2017-04-02 | Stop reason: SURG

## 2017-04-02 RX ORDER — ONDANSETRON 2 MG/ML
4 INJECTION INTRAMUSCULAR; INTRAVENOUS EVERY 4 HOURS PRN
Status: DISCONTINUED | OUTPATIENT
Start: 2017-04-02 | End: 2017-04-02 | Stop reason: HOSPADM

## 2017-04-02 RX ORDER — BUPIVACAINE HYDROCHLORIDE AND EPINEPHRINE 2.5; 5 MG/ML; UG/ML
INJECTION, SOLUTION INFILTRATION; PERINEURAL AS NEEDED
Status: DISCONTINUED | OUTPATIENT
Start: 2017-04-02 | End: 2017-04-02 | Stop reason: HOSPADM

## 2017-04-02 RX ORDER — ROCURONIUM BROMIDE 10 MG/ML
INJECTION, SOLUTION INTRAVENOUS AS NEEDED
Status: DISCONTINUED | OUTPATIENT
Start: 2017-04-02 | End: 2017-04-02 | Stop reason: SURG

## 2017-04-02 RX ORDER — ONDANSETRON 2 MG/ML
INJECTION INTRAMUSCULAR; INTRAVENOUS AS NEEDED
Status: DISCONTINUED | OUTPATIENT
Start: 2017-04-02 | End: 2017-04-02 | Stop reason: SURG

## 2017-04-02 RX ORDER — SODIUM CHLORIDE 9 MG/ML
INJECTION, SOLUTION INTRAVENOUS CONTINUOUS PRN
Status: DISCONTINUED | OUTPATIENT
Start: 2017-04-02 | End: 2017-04-02 | Stop reason: SURG

## 2017-04-02 RX ORDER — SUCCINYLCHOLINE CHLORIDE 20 MG/ML
INJECTION INTRAMUSCULAR; INTRAVENOUS AS NEEDED
Status: DISCONTINUED | OUTPATIENT
Start: 2017-04-02 | End: 2017-04-02 | Stop reason: SURG

## 2017-04-02 RX ORDER — GLYCOPYRROLATE 0.2 MG/ML
INJECTION INTRAMUSCULAR; INTRAVENOUS AS NEEDED
Status: DISCONTINUED | OUTPATIENT
Start: 2017-04-02 | End: 2017-04-02 | Stop reason: SURG

## 2017-04-02 RX ORDER — DEXTROSE, SODIUM CHLORIDE, AND POTASSIUM CHLORIDE 5; .9; .15 G/100ML; G/100ML; G/100ML
100 INJECTION INTRAVENOUS CONTINUOUS
Status: DISCONTINUED | OUTPATIENT
Start: 2017-04-02 | End: 2017-04-03

## 2017-04-02 RX ORDER — FENTANYL CITRATE 50 UG/ML
INJECTION, SOLUTION INTRAMUSCULAR; INTRAVENOUS AS NEEDED
Status: DISCONTINUED | OUTPATIENT
Start: 2017-04-02 | End: 2017-04-02 | Stop reason: SURG

## 2017-04-02 RX ADMIN — METOPROLOL TARTRATE 2.5 MG: 5 INJECTION INTRAVENOUS at 14:17

## 2017-04-02 RX ADMIN — LABETALOL HYDROCHLORIDE 5 MG: 5 INJECTION, SOLUTION INTRAVENOUS at 09:34

## 2017-04-02 RX ADMIN — METOPROLOL TARTRATE 2.5 MG: 5 INJECTION INTRAVENOUS at 03:01

## 2017-04-02 RX ADMIN — LABETALOL HYDROCHLORIDE 5 MG: 5 INJECTION, SOLUTION INTRAVENOUS at 09:03

## 2017-04-02 RX ADMIN — ROCURONIUM BROMIDE 10 MG: 10 INJECTION, SOLUTION INTRAVENOUS at 08:05

## 2017-04-02 RX ADMIN — LABETALOL HYDROCHLORIDE 5 MG: 5 INJECTION, SOLUTION INTRAVENOUS at 08:40

## 2017-04-02 RX ADMIN — HYDROMORPHONE HYDROCHLORIDE 0.4 MG: 1 INJECTION, SOLUTION INTRAMUSCULAR; INTRAVENOUS; SUBCUTANEOUS at 10:31

## 2017-04-02 RX ADMIN — HYDROMORPHONE HYDROCHLORIDE 1 MG: 1 INJECTION, SOLUTION INTRAMUSCULAR; INTRAVENOUS; SUBCUTANEOUS at 20:08

## 2017-04-02 RX ADMIN — ONDANSETRON 4 MG: 2 INJECTION INTRAMUSCULAR; INTRAVENOUS at 09:29

## 2017-04-02 RX ADMIN — GLYCOPYRROLATE 0.4 MG: 0.2 INJECTION INTRAMUSCULAR; INTRAVENOUS at 09:45

## 2017-04-02 RX ADMIN — HYDROMORPHONE HYDROCHLORIDE 1 MG: 1 INJECTION, SOLUTION INTRAMUSCULAR; INTRAVENOUS; SUBCUTANEOUS at 08:35

## 2017-04-02 RX ADMIN — METRONIDAZOLE 500 MG: 500 SOLUTION INTRAVENOUS at 08:20

## 2017-04-02 RX ADMIN — ONDANSETRON 4 MG: 2 INJECTION INTRAMUSCULAR; INTRAVENOUS at 20:08

## 2017-04-02 RX ADMIN — FENTANYL CITRATE 50 MCG: 50 INJECTION INTRAMUSCULAR; INTRAVENOUS at 08:23

## 2017-04-02 RX ADMIN — FENTANYL CITRATE 50 MCG: 50 INJECTION INTRAMUSCULAR; INTRAVENOUS at 08:05

## 2017-04-02 RX ADMIN — METOPROLOL TARTRATE 2.5 MG: 5 INJECTION INTRAVENOUS at 21:57

## 2017-04-02 RX ADMIN — HYDROMORPHONE HYDROCHLORIDE 0.5 MG: 1 INJECTION, SOLUTION INTRAMUSCULAR; INTRAVENOUS; SUBCUTANEOUS at 14:23

## 2017-04-02 RX ADMIN — HEPARIN SODIUM 5000 UNITS: 5000 INJECTION, SOLUTION INTRAVENOUS; SUBCUTANEOUS at 21:57

## 2017-04-02 RX ADMIN — ROCURONIUM BROMIDE 10 MG: 10 INJECTION, SOLUTION INTRAVENOUS at 08:23

## 2017-04-02 RX ADMIN — DEXTROSE, SODIUM CHLORIDE, AND POTASSIUM CHLORIDE 100 ML/HR: 5; .9; .15 INJECTION INTRAVENOUS at 16:09

## 2017-04-02 RX ADMIN — PANTOPRAZOLE SODIUM 40 MG: 40 INJECTION, POWDER, FOR SOLUTION INTRAVENOUS at 21:58

## 2017-04-02 RX ADMIN — ONDANSETRON 4 MG: 2 INJECTION INTRAMUSCULAR; INTRAVENOUS at 14:23

## 2017-04-02 RX ADMIN — LABETALOL HYDROCHLORIDE 5 MG: 5 INJECTION, SOLUTION INTRAVENOUS at 09:02

## 2017-04-02 RX ADMIN — HYDROMORPHONE HYDROCHLORIDE 1 MG: 1 INJECTION, SOLUTION INTRAMUSCULAR; INTRAVENOUS; SUBCUTANEOUS at 16:20

## 2017-04-02 RX ADMIN — NEOSTIGMINE METHYLSULFATE 2 MG: 1 INJECTION INTRAMUSCULAR; INTRAVENOUS; SUBCUTANEOUS at 09:45

## 2017-04-02 RX ADMIN — CEFAZOLIN SODIUM 1000 MG: 1 SOLUTION INTRAVENOUS at 08:20

## 2017-04-02 RX ADMIN — HYDROMORPHONE HYDROCHLORIDE 0.5 MG: 1 INJECTION, SOLUTION INTRAMUSCULAR; INTRAVENOUS; SUBCUTANEOUS at 11:41

## 2017-04-02 RX ADMIN — SODIUM CHLORIDE: 0.9 INJECTION, SOLUTION INTRAVENOUS at 08:09

## 2017-04-02 RX ADMIN — HYDROMORPHONE HYDROCHLORIDE 0.4 MG: 1 INJECTION, SOLUTION INTRAMUSCULAR; INTRAVENOUS; SUBCUTANEOUS at 10:23

## 2017-04-02 RX ADMIN — SODIUM CHLORIDE, SODIUM LACTATE, POTASSIUM CHLORIDE, AND CALCIUM CHLORIDE: .6; .31; .03; .02 INJECTION, SOLUTION INTRAVENOUS at 08:03

## 2017-04-02 RX ADMIN — SUCCINYLCHOLINE CHLORIDE 120 MG: 20 INJECTION, SOLUTION INTRAMUSCULAR; INTRAVENOUS at 08:05

## 2017-04-02 RX ADMIN — LABETALOL HYDROCHLORIDE 5 MG: 5 INJECTION, SOLUTION INTRAVENOUS at 10:01

## 2017-04-02 RX ADMIN — LABETALOL HYDROCHLORIDE 5 MG: 5 INJECTION, SOLUTION INTRAVENOUS at 09:49

## 2017-04-02 RX ADMIN — PROPOFOL 150 MG: 10 INJECTION, EMULSION INTRAVENOUS at 08:05

## 2017-04-02 RX ADMIN — SODIUM CHLORIDE AND POTASSIUM CHLORIDE 125 ML/HR: .9; .15 SOLUTION INTRAVENOUS at 06:29

## 2017-04-03 ENCOUNTER — GENERIC CONVERSION - ENCOUNTER (OUTPATIENT)
Dept: OTHER | Facility: OTHER | Age: 82
End: 2017-04-03

## 2017-04-03 ENCOUNTER — APPOINTMENT (INPATIENT)
Dept: NON INVASIVE DIAGNOSTICS | Facility: HOSPITAL | Age: 82
DRG: 327 | End: 2017-04-03
Payer: MEDICARE

## 2017-04-03 LAB
ANION GAP SERPL CALCULATED.3IONS-SCNC: 10 MMOL/L (ref 4–13)
BUN SERPL-MCNC: 28 MG/DL (ref 5–25)
CALCIUM SERPL-MCNC: 8.3 MG/DL (ref 8.3–10.1)
CHLORIDE SERPL-SCNC: 110 MMOL/L (ref 100–108)
CO2 SERPL-SCNC: 24 MMOL/L (ref 21–32)
CREAT SERPL-MCNC: 1.5 MG/DL (ref 0.6–1.3)
ERYTHROCYTE [DISTWIDTH] IN BLOOD BY AUTOMATED COUNT: 15.1 % (ref 11.6–15.1)
GFR SERPL CREATININE-BSD FRML MDRD: 32.7 ML/MIN/1.73SQ M
GLUCOSE SERPL-MCNC: 183 MG/DL (ref 65–140)
HCT VFR BLD AUTO: 37.1 % (ref 34.8–46.1)
HGB BLD-MCNC: 11.3 G/DL (ref 11.5–15.4)
MAGNESIUM SERPL-MCNC: 2.1 MG/DL (ref 1.6–2.6)
MCH RBC QN AUTO: 29 PG (ref 26.8–34.3)
MCHC RBC AUTO-ENTMCNC: 30.5 G/DL (ref 31.4–37.4)
MCV RBC AUTO: 95 FL (ref 82–98)
PLATELET # BLD AUTO: 187 THOUSANDS/UL (ref 149–390)
PMV BLD AUTO: 10 FL (ref 8.9–12.7)
POTASSIUM SERPL-SCNC: 4.8 MMOL/L (ref 3.5–5.3)
RBC # BLD AUTO: 3.9 MILLION/UL (ref 3.81–5.12)
SODIUM SERPL-SCNC: 144 MMOL/L (ref 136–145)
WBC # BLD AUTO: 9.96 THOUSAND/UL (ref 4.31–10.16)

## 2017-04-03 PROCEDURE — 83735 ASSAY OF MAGNESIUM: CPT | Performed by: SURGERY

## 2017-04-03 PROCEDURE — 93306 TTE W/DOPPLER COMPLETE: CPT

## 2017-04-03 PROCEDURE — 80048 BASIC METABOLIC PNL TOTAL CA: CPT | Performed by: SURGERY

## 2017-04-03 PROCEDURE — 85027 COMPLETE CBC AUTOMATED: CPT | Performed by: SURGERY

## 2017-04-03 PROCEDURE — C9113 INJ PANTOPRAZOLE SODIUM, VIA: HCPCS | Performed by: PHYSICIAN ASSISTANT

## 2017-04-03 RX ORDER — DEXTROSE, SODIUM CHLORIDE, AND POTASSIUM CHLORIDE 5; .9; .15 G/100ML; G/100ML; G/100ML
125 INJECTION INTRAVENOUS CONTINUOUS
Status: DISCONTINUED | OUTPATIENT
Start: 2017-04-03 | End: 2017-04-03

## 2017-04-03 RX ORDER — DEXTROSE AND SODIUM CHLORIDE 5; .9 G/100ML; G/100ML
75 INJECTION, SOLUTION INTRAVENOUS CONTINUOUS
Status: DISCONTINUED | OUTPATIENT
Start: 2017-04-03 | End: 2017-04-04

## 2017-04-03 RX ADMIN — DEXTROSE AND SODIUM CHLORIDE 75 ML/HR: 5; .9 INJECTION, SOLUTION INTRAVENOUS at 23:23

## 2017-04-03 RX ADMIN — HYDROMORPHONE HYDROCHLORIDE 1 MG: 1 INJECTION, SOLUTION INTRAMUSCULAR; INTRAVENOUS; SUBCUTANEOUS at 08:07

## 2017-04-03 RX ADMIN — HYDROMORPHONE HYDROCHLORIDE 1 MG: 1 INJECTION, SOLUTION INTRAMUSCULAR; INTRAVENOUS; SUBCUTANEOUS at 03:19

## 2017-04-03 RX ADMIN — METOPROLOL TARTRATE 2.5 MG: 5 INJECTION INTRAVENOUS at 03:19

## 2017-04-03 RX ADMIN — METOPROLOL TARTRATE 2.5 MG: 5 INJECTION INTRAVENOUS at 22:00

## 2017-04-03 RX ADMIN — DEXTROSE, SODIUM CHLORIDE, AND POTASSIUM CHLORIDE 125 ML/HR: 5; .9; .15 INJECTION INTRAVENOUS at 10:27

## 2017-04-03 RX ADMIN — PANTOPRAZOLE SODIUM 40 MG: 40 INJECTION, POWDER, FOR SOLUTION INTRAVENOUS at 22:01

## 2017-04-03 RX ADMIN — PANTOPRAZOLE SODIUM 40 MG: 40 INJECTION, POWDER, FOR SOLUTION INTRAVENOUS at 08:06

## 2017-04-03 RX ADMIN — HEPARIN SODIUM 5000 UNITS: 5000 INJECTION, SOLUTION INTRAVENOUS; SUBCUTANEOUS at 14:40

## 2017-04-03 RX ADMIN — ONDANSETRON 4 MG: 2 INJECTION INTRAMUSCULAR; INTRAVENOUS at 05:25

## 2017-04-03 RX ADMIN — ONDANSETRON 4 MG: 2 INJECTION INTRAMUSCULAR; INTRAVENOUS at 11:39

## 2017-04-03 RX ADMIN — HEPARIN SODIUM 5000 UNITS: 5000 INJECTION, SOLUTION INTRAVENOUS; SUBCUTANEOUS at 22:01

## 2017-04-03 RX ADMIN — METOPROLOL TARTRATE 2.5 MG: 5 INJECTION INTRAVENOUS at 08:06

## 2017-04-03 RX ADMIN — HYDROMORPHONE HYDROCHLORIDE 1 MG: 1 INJECTION, SOLUTION INTRAMUSCULAR; INTRAVENOUS; SUBCUTANEOUS at 14:40

## 2017-04-03 RX ADMIN — HYDROMORPHONE HYDROCHLORIDE 0.5 MG: 1 INJECTION, SOLUTION INTRAMUSCULAR; INTRAVENOUS; SUBCUTANEOUS at 10:27

## 2017-04-03 RX ADMIN — DEXTROSE, SODIUM CHLORIDE, AND POTASSIUM CHLORIDE 125 ML/HR: 5; .9; .15 INJECTION INTRAVENOUS at 05:35

## 2017-04-03 RX ADMIN — METOPROLOL TARTRATE 2.5 MG: 5 INJECTION INTRAVENOUS at 14:40

## 2017-04-03 RX ADMIN — Medication 1 SPRAY: at 11:41

## 2017-04-03 RX ADMIN — HEPARIN SODIUM 5000 UNITS: 5000 INJECTION, SOLUTION INTRAVENOUS; SUBCUTANEOUS at 05:25

## 2017-04-04 ENCOUNTER — APPOINTMENT (INPATIENT)
Dept: RADIOLOGY | Facility: HOSPITAL | Age: 82
DRG: 327 | End: 2017-04-04
Payer: MEDICARE

## 2017-04-04 ENCOUNTER — APPOINTMENT (INPATIENT)
Dept: PHYSICAL THERAPY | Facility: HOSPITAL | Age: 82
DRG: 327 | End: 2017-04-04
Payer: MEDICARE

## 2017-04-04 ENCOUNTER — ALLSCRIPTS OFFICE VISIT (OUTPATIENT)
Dept: OTHER | Facility: OTHER | Age: 82
End: 2017-04-04

## 2017-04-04 PROBLEM — E16.2 HYPOGLYCEMIA: Status: RESOLVED | Noted: 2017-04-02 | Resolved: 2017-04-04

## 2017-04-04 PROBLEM — R09.02 HYPOXIA: Status: ACTIVE | Noted: 2017-04-04

## 2017-04-04 LAB
ANION GAP SERPL CALCULATED.3IONS-SCNC: 9 MMOL/L (ref 4–13)
BUN SERPL-MCNC: 22 MG/DL (ref 5–25)
CALCIUM SERPL-MCNC: 7.1 MG/DL (ref 8.3–10.1)
CHLORIDE SERPL-SCNC: 114 MMOL/L (ref 100–108)
CO2 SERPL-SCNC: 23 MMOL/L (ref 21–32)
CREAT SERPL-MCNC: 1.36 MG/DL (ref 0.6–1.3)
GFR SERPL CREATININE-BSD FRML MDRD: 36.6 ML/MIN/1.73SQ M
GLUCOSE SERPL-MCNC: 132 MG/DL (ref 65–140)
GLUCOSE SERPL-MCNC: 547 MG/DL (ref 65–140)
POTASSIUM SERPL-SCNC: 4 MMOL/L (ref 3.5–5.3)
SODIUM SERPL-SCNC: 146 MMOL/L (ref 136–145)

## 2017-04-04 PROCEDURE — 80048 BASIC METABOLIC PNL TOTAL CA: CPT | Performed by: PHYSICIAN ASSISTANT

## 2017-04-04 PROCEDURE — 82948 REAGENT STRIP/BLOOD GLUCOSE: CPT

## 2017-04-04 PROCEDURE — C9113 INJ PANTOPRAZOLE SODIUM, VIA: HCPCS | Performed by: PHYSICIAN ASSISTANT

## 2017-04-04 PROCEDURE — 71010 HB CHEST X-RAY 1 VIEW FRONTAL (PORTABLE): CPT

## 2017-04-04 RX ORDER — AMLODIPINE BESYLATE 5 MG/1
5 TABLET ORAL DAILY
Status: DISCONTINUED | OUTPATIENT
Start: 2017-04-04 | End: 2017-04-05 | Stop reason: HOSPADM

## 2017-04-04 RX ORDER — FUROSEMIDE 40 MG/1
40 TABLET ORAL DAILY
Status: DISCONTINUED | OUTPATIENT
Start: 2017-04-05 | End: 2017-04-05 | Stop reason: HOSPADM

## 2017-04-04 RX ORDER — FUROSEMIDE 10 MG/ML
20 INJECTION INTRAMUSCULAR; INTRAVENOUS ONCE
Status: COMPLETED | OUTPATIENT
Start: 2017-04-04 | End: 2017-04-04

## 2017-04-04 RX ORDER — SODIUM CHLORIDE 450 MG/100ML
50 INJECTION, SOLUTION INTRAVENOUS CONTINUOUS
Status: DISCONTINUED | OUTPATIENT
Start: 2017-04-04 | End: 2017-04-05

## 2017-04-04 RX ORDER — FUROSEMIDE 40 MG/1
40 TABLET ORAL DAILY
Status: DISCONTINUED | OUTPATIENT
Start: 2017-04-04 | End: 2017-04-04

## 2017-04-04 RX ADMIN — METOPROLOL TARTRATE 2.5 MG: 5 INJECTION INTRAVENOUS at 03:43

## 2017-04-04 RX ADMIN — FUROSEMIDE 20 MG: 10 INJECTION, SOLUTION INTRAMUSCULAR; INTRAVENOUS at 11:25

## 2017-04-04 RX ADMIN — HEPARIN SODIUM 5000 UNITS: 5000 INJECTION, SOLUTION INTRAVENOUS; SUBCUTANEOUS at 05:42

## 2017-04-04 RX ADMIN — APIXABAN 2.5 MG: 2.5 TABLET, FILM COATED ORAL at 11:24

## 2017-04-04 RX ADMIN — PANTOPRAZOLE SODIUM 40 MG: 40 INJECTION, POWDER, FOR SOLUTION INTRAVENOUS at 08:28

## 2017-04-04 RX ADMIN — AMLODIPINE BESYLATE 5 MG: 5 TABLET ORAL at 11:24

## 2017-04-04 RX ADMIN — HYDROMORPHONE HYDROCHLORIDE 1 MG: 1 INJECTION, SOLUTION INTRAMUSCULAR; INTRAVENOUS; SUBCUTANEOUS at 08:28

## 2017-04-04 RX ADMIN — METOPROLOL TARTRATE 25 MG: 25 TABLET ORAL at 20:46

## 2017-04-04 RX ADMIN — APIXABAN 2.5 MG: 2.5 TABLET, FILM COATED ORAL at 17:13

## 2017-04-04 RX ADMIN — SODIUM CHLORIDE 75 ML/HR: 0.45 INJECTION, SOLUTION INTRAVENOUS at 08:27

## 2017-04-04 RX ADMIN — PANTOPRAZOLE SODIUM 40 MG: 40 INJECTION, POWDER, FOR SOLUTION INTRAVENOUS at 20:46

## 2017-04-04 RX ADMIN — METOPROLOL TARTRATE 2.5 MG: 5 INJECTION INTRAVENOUS at 08:28

## 2017-04-04 RX ADMIN — ONDANSETRON 4 MG: 2 INJECTION INTRAMUSCULAR; INTRAVENOUS at 10:07

## 2017-04-05 ENCOUNTER — APPOINTMENT (INPATIENT)
Dept: PHYSICAL THERAPY | Facility: HOSPITAL | Age: 82
DRG: 327 | End: 2017-04-05
Payer: MEDICARE

## 2017-04-05 VITALS
WEIGHT: 172.4 LBS | HEIGHT: 67 IN | RESPIRATION RATE: 16 BRPM | DIASTOLIC BLOOD PRESSURE: 76 MMHG | BODY MASS INDEX: 27.06 KG/M2 | SYSTOLIC BLOOD PRESSURE: 150 MMHG | OXYGEN SATURATION: 93 % | HEART RATE: 65 BPM | TEMPERATURE: 97.6 F

## 2017-04-05 PROBLEM — R09.02 HYPOXIA: Status: RESOLVED | Noted: 2017-04-04 | Resolved: 2017-04-05

## 2017-04-05 LAB
ANION GAP SERPL CALCULATED.3IONS-SCNC: 12 MMOL/L (ref 4–13)
BUN SERPL-MCNC: 25 MG/DL (ref 5–25)
CALCIUM SERPL-MCNC: 8 MG/DL (ref 8.3–10.1)
CHLORIDE SERPL-SCNC: 104 MMOL/L (ref 100–108)
CO2 SERPL-SCNC: 20 MMOL/L (ref 21–32)
CREAT SERPL-MCNC: 1.41 MG/DL (ref 0.6–1.3)
GFR SERPL CREATININE-BSD FRML MDRD: 35.1 ML/MIN/1.73SQ M
GLUCOSE SERPL-MCNC: 86 MG/DL (ref 65–140)
POTASSIUM SERPL-SCNC: 3.8 MMOL/L (ref 3.5–5.3)
SODIUM SERPL-SCNC: 136 MMOL/L (ref 136–145)

## 2017-04-05 PROCEDURE — G8978 MOBILITY CURRENT STATUS: HCPCS

## 2017-04-05 PROCEDURE — C9113 INJ PANTOPRAZOLE SODIUM, VIA: HCPCS | Performed by: PHYSICIAN ASSISTANT

## 2017-04-05 PROCEDURE — G8979 MOBILITY GOAL STATUS: HCPCS

## 2017-04-05 PROCEDURE — 97162 PT EVAL MOD COMPLEX 30 MIN: CPT

## 2017-04-05 PROCEDURE — 80048 BASIC METABOLIC PNL TOTAL CA: CPT | Performed by: PHYSICIAN ASSISTANT

## 2017-04-05 RX ORDER — ACETAMINOPHEN 325 MG/1
650 TABLET ORAL EVERY 6 HOURS PRN
Status: DISCONTINUED | OUTPATIENT
Start: 2017-04-05 | End: 2017-04-05 | Stop reason: HOSPADM

## 2017-04-05 RX ORDER — FUROSEMIDE 40 MG/1
40 TABLET ORAL DAILY
Qty: 30 TABLET | Refills: 0 | Status: ON HOLD
Start: 2017-04-05 | End: 2017-07-09

## 2017-04-05 RX ADMIN — ACETAMINOPHEN 650 MG: 325 TABLET ORAL at 13:40

## 2017-04-05 RX ADMIN — PANTOPRAZOLE SODIUM 40 MG: 40 INJECTION, POWDER, FOR SOLUTION INTRAVENOUS at 08:58

## 2017-04-05 RX ADMIN — SODIUM CHLORIDE 50 ML/HR: 0.45 INJECTION, SOLUTION INTRAVENOUS at 04:38

## 2017-04-05 RX ADMIN — METOPROLOL TARTRATE 25 MG: 25 TABLET ORAL at 08:58

## 2017-04-05 RX ADMIN — AMLODIPINE BESYLATE 5 MG: 5 TABLET ORAL at 08:58

## 2017-04-05 RX ADMIN — FUROSEMIDE 40 MG: 40 TABLET ORAL at 08:58

## 2017-04-05 RX ADMIN — APIXABAN 2.5 MG: 2.5 TABLET, FILM COATED ORAL at 08:58

## 2017-04-10 ENCOUNTER — GENERIC CONVERSION - ENCOUNTER (OUTPATIENT)
Dept: OTHER | Facility: OTHER | Age: 82
End: 2017-04-10

## 2017-04-27 ENCOUNTER — ALLSCRIPTS OFFICE VISIT (OUTPATIENT)
Dept: OTHER | Facility: OTHER | Age: 82
End: 2017-04-27

## 2017-05-10 ENCOUNTER — LAB REQUISITION (OUTPATIENT)
Dept: LAB | Facility: HOSPITAL | Age: 82
End: 2017-05-10
Payer: MEDICARE

## 2017-05-10 DIAGNOSIS — L03.116 CELLULITIS OF LEFT LOWER EXTREMITY: ICD-10-CM

## 2017-05-10 PROCEDURE — 87070 CULTURE OTHR SPECIMN AEROBIC: CPT | Performed by: PHYSICIAN ASSISTANT

## 2017-05-10 PROCEDURE — 87205 SMEAR GRAM STAIN: CPT | Performed by: PHYSICIAN ASSISTANT

## 2017-05-12 LAB
BACTERIA WND AEROBE CULT: NORMAL
GRAM STN SPEC: NORMAL

## 2017-05-15 DIAGNOSIS — N18.30 CHRONIC KIDNEY DISEASE, STAGE III (MODERATE) (HCC): ICD-10-CM

## 2017-05-22 ENCOUNTER — LAB CONVERSION - ENCOUNTER (OUTPATIENT)
Dept: OTHER | Facility: OTHER | Age: 82
End: 2017-05-22

## 2017-05-22 LAB
BUN SERPL-MCNC: 28 MG/DL (ref 7–25)
BUN/CREA RATIO (HISTORICAL): 19 (CALC) (ref 6–22)
CALCIUM SERPL-MCNC: 8.9 MG/DL (ref 8.6–10.4)
CALCIUM SERPL-MCNC: 8.9 MG/DL (ref 8.6–10.4)
CHLORIDE SERPL-SCNC: 105 MMOL/L (ref 98–110)
CO2 SERPL-SCNC: 29 MMOL/L (ref 20–31)
CREAT SERPL-MCNC: 1.5 MG/DL (ref 0.6–0.88)
EGFR AFRICAN AMERICAN (HISTORICAL): 35 ML/MIN/1.73M2
EGFR-AMERICAN CALC (HISTORICAL): 31 ML/MIN/1.73M2
GLUCOSE (HISTORICAL): 102 MG/DL (ref 65–99)
PHOSPHATE SERPL-MCNC: 3.8 MG/DL (ref 2.1–4.3)
POTASSIUM SERPL-SCNC: 4.6 MMOL/L (ref 3.5–5.3)
SODIUM SERPL-SCNC: 142 MMOL/L (ref 135–146)

## 2017-05-23 ENCOUNTER — LAB CONVERSION - ENCOUNTER (OUTPATIENT)
Dept: OTHER | Facility: OTHER | Age: 82
End: 2017-05-23

## 2017-05-23 LAB
BUN SERPL-MCNC: 28 MG/DL (ref 7–25)
BUN/CREA RATIO (HISTORICAL): 19 (CALC) (ref 6–22)
CALCIUM SERPL-MCNC: 8.9 MG/DL (ref 8.6–10.4)
CALCIUM SERPL-MCNC: 8.9 MG/DL (ref 8.6–10.4)
CHLORIDE SERPL-SCNC: 105 MMOL/L (ref 98–110)
CO2 SERPL-SCNC: 29 MMOL/L (ref 20–31)
CREAT SERPL-MCNC: 1.5 MG/DL (ref 0.6–0.88)
EGFR AFRICAN AMERICAN (HISTORICAL): 35 ML/MIN/1.73M2
EGFR-AMERICAN CALC (HISTORICAL): 31 ML/MIN/1.73M2
GLUCOSE (HISTORICAL): 102 MG/DL (ref 65–99)
PHOSPHATE SERPL-MCNC: 3.8 MG/DL (ref 2.1–4.3)
POTASSIUM SERPL-SCNC: 4.6 MMOL/L (ref 3.5–5.3)
PTH-INTACT SERPL-MCNC: 71 PG/ML (ref 14–64)
SODIUM SERPL-SCNC: 142 MMOL/L (ref 135–146)

## 2017-05-24 ENCOUNTER — LAB CONVERSION - ENCOUNTER (OUTPATIENT)
Dept: OTHER | Facility: OTHER | Age: 82
End: 2017-05-24

## 2017-05-24 LAB
CREATININE, RANDOM URINE (HISTORICAL): 109 MG/DL (ref 20–320)
PROT UR-MCNC: 22 MG/DL (ref 5–24)
PROT/CREAT UR: 202 MG/G CREAT (ref 21–161)

## 2017-06-01 ENCOUNTER — ALLSCRIPTS OFFICE VISIT (OUTPATIENT)
Dept: OTHER | Facility: OTHER | Age: 82
End: 2017-06-01

## 2017-06-06 ENCOUNTER — ALLSCRIPTS OFFICE VISIT (OUTPATIENT)
Dept: OTHER | Facility: OTHER | Age: 82
End: 2017-06-06

## 2017-06-14 ENCOUNTER — ALLSCRIPTS OFFICE VISIT (OUTPATIENT)
Dept: OTHER | Facility: OTHER | Age: 82
End: 2017-06-14

## 2017-07-03 ENCOUNTER — ALLSCRIPTS OFFICE VISIT (OUTPATIENT)
Dept: OTHER | Facility: OTHER | Age: 82
End: 2017-07-03

## 2017-07-03 DIAGNOSIS — R17 JAUNDICE: ICD-10-CM

## 2017-07-04 LAB
A/G RATIO (HISTORICAL): 1.3 (CALC) (ref 1–2.5)
ALBUMIN SERPL BCP-MCNC: 3.6 G/DL (ref 3.6–5.1)
ALP SERPL-CCNC: 112 U/L (ref 33–130)
ALT SERPL W P-5'-P-CCNC: 12 U/L (ref 6–29)
AST SERPL W P-5'-P-CCNC: 15 U/L (ref 10–35)
BILIRUB SERPL-MCNC: 1.4 MG/DL (ref 0.2–1.2)
BUN SERPL-MCNC: 31 MG/DL (ref 7–25)
BUN/CREA RATIO (HISTORICAL): 23 (CALC) (ref 6–22)
CALCIUM SERPL-MCNC: 8.3 MG/DL (ref 8.6–10.4)
CHLORIDE SERPL-SCNC: 101 MMOL/L (ref 98–110)
CO2 SERPL-SCNC: 22 MMOL/L (ref 20–31)
CREAT SERPL-MCNC: 1.37 MG/DL (ref 0.6–0.88)
EGFR AFRICAN AMERICAN (HISTORICAL): 40 ML/MIN/1.73M2
EGFR-AMERICAN CALC (HISTORICAL): 34 ML/MIN/1.73M2
GAMMA GLOBULIN (HISTORICAL): 2.7 G/DL (CALC) (ref 1.9–3.7)
GLUCOSE (HISTORICAL): 110 MG/DL (ref 65–99)
POTASSIUM SERPL-SCNC: 4.4 MMOL/L (ref 3.5–5.3)
SODIUM SERPL-SCNC: 134 MMOL/L (ref 135–146)
TOTAL PROTEIN (HISTORICAL): 6.3 G/DL (ref 6.1–8.1)

## 2017-07-05 ENCOUNTER — HOSPITAL ENCOUNTER (INPATIENT)
Facility: HOSPITAL | Age: 82
LOS: 4 days | Discharge: NURSING, REHAB OR LONG-TERM CARE | DRG: 177 | End: 2017-07-09
Attending: EMERGENCY MEDICINE | Admitting: EMERGENCY MEDICINE
Payer: MEDICARE

## 2017-07-05 ENCOUNTER — GENERIC CONVERSION - ENCOUNTER (OUTPATIENT)
Dept: OTHER | Facility: OTHER | Age: 82
End: 2017-07-05

## 2017-07-05 ENCOUNTER — APPOINTMENT (EMERGENCY)
Dept: RADIOLOGY | Facility: HOSPITAL | Age: 82
DRG: 177 | End: 2017-07-05
Payer: MEDICARE

## 2017-07-05 DIAGNOSIS — J18.9 PNEUMONIA: Primary | ICD-10-CM

## 2017-07-05 DIAGNOSIS — I50.9 CHF, ACUTE (HCC): ICD-10-CM

## 2017-07-05 DIAGNOSIS — K44.0 PARAESOPHAGEAL HERNIA WITH OBSTRUCTION BUT NO GANGRENE: ICD-10-CM

## 2017-07-05 DIAGNOSIS — J96.01 ACUTE RESPIRATORY FAILURE WITH HYPOXIA (HCC): ICD-10-CM

## 2017-07-05 DIAGNOSIS — K21.9 ESOPHAGEAL REFLUX: ICD-10-CM

## 2017-07-05 LAB
ANION GAP SERPL CALCULATED.3IONS-SCNC: 14 MMOL/L (ref 4–13)
APTT PPP: 45 SECONDS (ref 23–35)
BASOPHILS # BLD AUTO: 0.02 THOUSANDS/ΜL (ref 0–0.1)
BASOPHILS NFR BLD AUTO: 0 % (ref 0–1)
BUN SERPL-MCNC: 41 MG/DL (ref 5–25)
CALCIUM SERPL-MCNC: 8.8 MG/DL (ref 8.3–10.1)
CHLORIDE SERPL-SCNC: 99 MMOL/L (ref 100–108)
CO2 SERPL-SCNC: 23 MMOL/L (ref 21–32)
CREAT SERPL-MCNC: 1.62 MG/DL (ref 0.6–1.3)
EOSINOPHIL # BLD AUTO: 0.01 THOUSAND/ΜL (ref 0–0.61)
EOSINOPHIL NFR BLD AUTO: 0 % (ref 0–6)
ERYTHROCYTE [DISTWIDTH] IN BLOOD BY AUTOMATED COUNT: 14.3 % (ref 11.6–15.1)
GFR SERPL CREATININE-BSD FRML MDRD: 29.9 ML/MIN/1.73SQ M
GLUCOSE SERPL-MCNC: 117 MG/DL (ref 65–140)
HCT VFR BLD AUTO: 37.7 % (ref 34.8–46.1)
HGB BLD-MCNC: 11.9 G/DL (ref 11.5–15.4)
HOLD SPECIMEN: NORMAL
INR PPP: 2.44 (ref 0.86–1.16)
LYMPHOCYTES # BLD AUTO: 0.37 THOUSANDS/ΜL (ref 0.6–4.47)
LYMPHOCYTES NFR BLD AUTO: 4 % (ref 14–44)
MAGNESIUM SERPL-MCNC: 2.4 MG/DL (ref 1.6–2.6)
MCH RBC QN AUTO: 30 PG (ref 26.8–34.3)
MCHC RBC AUTO-ENTMCNC: 31.6 G/DL (ref 31.4–37.4)
MCV RBC AUTO: 95 FL (ref 82–98)
MONOCYTES # BLD AUTO: 1.13 THOUSAND/ΜL (ref 0.17–1.22)
MONOCYTES NFR BLD AUTO: 11 % (ref 4–12)
NEUTROPHILS # BLD AUTO: 9.07 THOUSANDS/ΜL (ref 1.85–7.62)
NEUTS SEG NFR BLD AUTO: 85 % (ref 43–75)
NT-PROBNP SERPL-MCNC: ABNORMAL PG/ML
PLATELET # BLD AUTO: 179 THOUSANDS/UL (ref 149–390)
PMV BLD AUTO: 10.3 FL (ref 8.9–12.7)
POTASSIUM SERPL-SCNC: 4.3 MMOL/L (ref 3.5–5.3)
PROTHROMBIN TIME: 27.4 SECONDS (ref 12.1–14.4)
RBC # BLD AUTO: 3.97 MILLION/UL (ref 3.81–5.12)
SODIUM SERPL-SCNC: 136 MMOL/L (ref 136–145)
SPECIMEN SOURCE: NORMAL
TROPONIN I BLD-MCNC: 0.02 NG/ML (ref 0–0.08)
TROPONIN I SERPL-MCNC: <0.02 NG/ML
WBC # BLD AUTO: 10.6 THOUSAND/UL (ref 4.31–10.16)

## 2017-07-05 PROCEDURE — 96375 TX/PRO/DX INJ NEW DRUG ADDON: CPT

## 2017-07-05 PROCEDURE — 85610 PROTHROMBIN TIME: CPT | Performed by: PHYSICIAN ASSISTANT

## 2017-07-05 PROCEDURE — 94760 N-INVAS EAR/PLS OXIMETRY 1: CPT

## 2017-07-05 PROCEDURE — 94640 AIRWAY INHALATION TREATMENT: CPT

## 2017-07-05 PROCEDURE — 85025 COMPLETE CBC W/AUTO DIFF WBC: CPT | Performed by: PHYSICIAN ASSISTANT

## 2017-07-05 PROCEDURE — 96374 THER/PROPH/DIAG INJ IV PUSH: CPT

## 2017-07-05 PROCEDURE — 84484 ASSAY OF TROPONIN QUANT: CPT | Performed by: PHYSICIAN ASSISTANT

## 2017-07-05 PROCEDURE — 84484 ASSAY OF TROPONIN QUANT: CPT

## 2017-07-05 PROCEDURE — 83735 ASSAY OF MAGNESIUM: CPT | Performed by: PHYSICIAN ASSISTANT

## 2017-07-05 PROCEDURE — 71010 HB CHEST X-RAY 1 VIEW FRONTAL (PORTABLE): CPT

## 2017-07-05 PROCEDURE — 36415 COLL VENOUS BLD VENIPUNCTURE: CPT | Performed by: PHYSICIAN ASSISTANT

## 2017-07-05 PROCEDURE — 85730 THROMBOPLASTIN TIME PARTIAL: CPT | Performed by: PHYSICIAN ASSISTANT

## 2017-07-05 PROCEDURE — 87040 BLOOD CULTURE FOR BACTERIA: CPT | Performed by: PHYSICIAN ASSISTANT

## 2017-07-05 PROCEDURE — 80048 BASIC METABOLIC PNL TOTAL CA: CPT | Performed by: PHYSICIAN ASSISTANT

## 2017-07-05 PROCEDURE — 94660 CPAP INITIATION&MGMT: CPT

## 2017-07-05 PROCEDURE — 99285 EMERGENCY DEPT VISIT HI MDM: CPT

## 2017-07-05 PROCEDURE — 93005 ELECTROCARDIOGRAM TRACING: CPT | Performed by: PHYSICIAN ASSISTANT

## 2017-07-05 PROCEDURE — 94668 MNPJ CHEST WALL SBSQ: CPT

## 2017-07-05 PROCEDURE — 83880 ASSAY OF NATRIURETIC PEPTIDE: CPT | Performed by: PHYSICIAN ASSISTANT

## 2017-07-05 RX ORDER — ACETAMINOPHEN 325 MG/1
650 TABLET ORAL EVERY 6 HOURS PRN
Status: DISCONTINUED | OUTPATIENT
Start: 2017-07-05 | End: 2017-07-09 | Stop reason: HOSPADM

## 2017-07-05 RX ORDER — GUAIFENESIN 600 MG
1200 TABLET, EXTENDED RELEASE 12 HR ORAL EVERY 12 HOURS SCHEDULED
Status: DISCONTINUED | OUTPATIENT
Start: 2017-07-05 | End: 2017-07-09 | Stop reason: HOSPADM

## 2017-07-05 RX ORDER — ALBUTEROL SULFATE 2.5 MG/3ML
2.5 SOLUTION RESPIRATORY (INHALATION) EVERY 6 HOURS PRN
Status: DISCONTINUED | OUTPATIENT
Start: 2017-07-05 | End: 2017-07-05

## 2017-07-05 RX ORDER — PANTOPRAZOLE SODIUM 40 MG/1
40 TABLET, DELAYED RELEASE ORAL
Status: DISCONTINUED | OUTPATIENT
Start: 2017-07-06 | End: 2017-07-09 | Stop reason: HOSPADM

## 2017-07-05 RX ORDER — FUROSEMIDE 10 MG/ML
20 INJECTION INTRAMUSCULAR; INTRAVENOUS DAILY
Status: DISCONTINUED | OUTPATIENT
Start: 2017-07-06 | End: 2017-07-05

## 2017-07-05 RX ORDER — ONDANSETRON 2 MG/ML
4 INJECTION INTRAMUSCULAR; INTRAVENOUS EVERY 6 HOURS PRN
Status: DISCONTINUED | OUTPATIENT
Start: 2017-07-05 | End: 2017-07-09 | Stop reason: HOSPADM

## 2017-07-05 RX ORDER — ALBUTEROL SULFATE 2.5 MG/3ML
2.5 SOLUTION RESPIRATORY (INHALATION) EVERY 6 HOURS
Status: DISCONTINUED | OUTPATIENT
Start: 2017-07-05 | End: 2017-07-05

## 2017-07-05 RX ORDER — FUROSEMIDE 40 MG/1
40 TABLET ORAL DAILY
Status: DISCONTINUED | OUTPATIENT
Start: 2017-07-05 | End: 2017-07-09 | Stop reason: HOSPADM

## 2017-07-05 RX ORDER — AMLODIPINE BESYLATE 5 MG/1
5 TABLET ORAL DAILY
Status: DISCONTINUED | OUTPATIENT
Start: 2017-07-05 | End: 2017-07-09 | Stop reason: HOSPADM

## 2017-07-05 RX ORDER — SODIUM CHLORIDE 9 MG/ML
50 INJECTION, SOLUTION INTRAVENOUS CONTINUOUS
Status: DISCONTINUED | OUTPATIENT
Start: 2017-07-05 | End: 2017-07-05

## 2017-07-05 RX ORDER — SODIUM CHLORIDE 9 MG/ML
125 INJECTION, SOLUTION INTRAVENOUS CONTINUOUS
Status: DISCONTINUED | OUTPATIENT
Start: 2017-07-05 | End: 2017-07-05

## 2017-07-05 RX ORDER — ALBUTEROL SULFATE 2.5 MG/3ML
2.5 SOLUTION RESPIRATORY (INHALATION) EVERY 6 HOURS PRN
Status: DISCONTINUED | OUTPATIENT
Start: 2017-07-05 | End: 2017-07-09 | Stop reason: HOSPADM

## 2017-07-05 RX ORDER — CIPROFLOXACIN HYDROCHLORIDE 3.5 MG/ML
1 SOLUTION/ DROPS TOPICAL
Status: DISCONTINUED | OUTPATIENT
Start: 2017-07-05 | End: 2017-07-09 | Stop reason: HOSPADM

## 2017-07-05 RX ORDER — SODIUM CHLORIDE FOR INHALATION 0.9 %
3 VIAL, NEBULIZER (ML) INHALATION
Status: DISCONTINUED | OUTPATIENT
Start: 2017-07-05 | End: 2017-07-09 | Stop reason: HOSPADM

## 2017-07-05 RX ORDER — IPRATROPIUM BROMIDE AND ALBUTEROL SULFATE 2.5; .5 MG/3ML; MG/3ML
3 SOLUTION RESPIRATORY (INHALATION) EVERY 2 HOUR PRN
Status: DISCONTINUED | OUTPATIENT
Start: 2017-07-05 | End: 2017-07-05

## 2017-07-05 RX ORDER — FUROSEMIDE 10 MG/ML
20 INJECTION INTRAMUSCULAR; INTRAVENOUS ONCE
Status: COMPLETED | OUTPATIENT
Start: 2017-07-05 | End: 2017-07-05

## 2017-07-05 RX ORDER — CIPROFLOXACIN HYDROCHLORIDE 3.5 MG/ML
1 SOLUTION/ DROPS TOPICAL
Status: DISCONTINUED | OUTPATIENT
Start: 2017-07-05 | End: 2017-07-05

## 2017-07-05 RX ORDER — PANTOPRAZOLE SODIUM 40 MG/1
40 TABLET, DELAYED RELEASE ORAL DAILY
Status: DISCONTINUED | OUTPATIENT
Start: 2017-07-05 | End: 2017-07-05

## 2017-07-05 RX ORDER — LEVALBUTEROL 1.25 MG/.5ML
1.25 SOLUTION, CONCENTRATE RESPIRATORY (INHALATION)
Status: DISCONTINUED | OUTPATIENT
Start: 2017-07-05 | End: 2017-07-09 | Stop reason: HOSPADM

## 2017-07-05 RX ORDER — LEVALBUTEROL 1.25 MG/.5ML
1.25 SOLUTION, CONCENTRATE RESPIRATORY (INHALATION)
Status: DISCONTINUED | OUTPATIENT
Start: 2017-07-05 | End: 2017-07-05

## 2017-07-05 RX ORDER — IPRATROPIUM BROMIDE AND ALBUTEROL SULFATE 2.5; .5 MG/3ML; MG/3ML
3 SOLUTION RESPIRATORY (INHALATION)
Status: DISCONTINUED | OUTPATIENT
Start: 2017-07-05 | End: 2017-07-05

## 2017-07-05 RX ORDER — SODIUM CHLORIDE FOR INHALATION 0.9 %
VIAL, NEBULIZER (ML) INHALATION
Status: COMPLETED
Start: 2017-07-05 | End: 2017-07-05

## 2017-07-05 RX ORDER — ALBUTEROL SULFATE 2.5 MG/3ML
5 SOLUTION RESPIRATORY (INHALATION) ONCE
Status: COMPLETED | OUTPATIENT
Start: 2017-07-05 | End: 2017-07-05

## 2017-07-05 RX ADMIN — LEVALBUTEROL HYDROCHLORIDE 1.25 MG: 1.25 SOLUTION, CONCENTRATE RESPIRATORY (INHALATION) at 19:19

## 2017-07-05 RX ADMIN — ISODIUM CHLORIDE 3 ML: 0.03 SOLUTION RESPIRATORY (INHALATION) at 19:19

## 2017-07-05 RX ADMIN — CIPROFLOXACIN HYDROCHLORIDE 1 DROP: 3 SOLUTION/ DROPS OPHTHALMIC at 17:25

## 2017-07-05 RX ADMIN — FUROSEMIDE 20 MG: 10 INJECTION, SOLUTION INTRAMUSCULAR; INTRAVENOUS at 10:13

## 2017-07-05 RX ADMIN — ALBUTEROL SULFATE 2.5 MG: 2.5 SOLUTION RESPIRATORY (INHALATION) at 13:52

## 2017-07-05 RX ADMIN — CIPROFLOXACIN HYDROCHLORIDE 1 DROP: 3 SOLUTION/ DROPS OPHTHALMIC at 14:05

## 2017-07-05 RX ADMIN — METOPROLOL TARTRATE 25 MG: 25 TABLET ORAL at 13:56

## 2017-07-05 RX ADMIN — SODIUM CHLORIDE 500 ML: 0.9 INJECTION, SOLUTION INTRAVENOUS at 10:06

## 2017-07-05 RX ADMIN — GUAIFENESIN 1200 MG: 600 TABLET, EXTENDED RELEASE ORAL at 21:45

## 2017-07-05 RX ADMIN — CIPROFLOXACIN HYDROCHLORIDE 1 DROP: 3 SOLUTION/ DROPS OPHTHALMIC at 21:46

## 2017-07-05 RX ADMIN — RIVAROXABAN 10 MG: 10 TABLET, FILM COATED ORAL at 13:56

## 2017-07-05 RX ADMIN — SODIUM CHLORIDE 50 ML/HR: 0.9 INJECTION, SOLUTION INTRAVENOUS at 12:45

## 2017-07-05 RX ADMIN — METRONIDAZOLE 500 MG: 500 INJECTION, SOLUTION INTRAVENOUS at 18:33

## 2017-07-05 RX ADMIN — IPRATROPIUM BROMIDE 0.5 MG: 0.5 SOLUTION RESPIRATORY (INHALATION) at 09:23

## 2017-07-05 RX ADMIN — ACETAMINOPHEN 650 MG: 325 TABLET ORAL at 14:29

## 2017-07-05 RX ADMIN — PANTOPRAZOLE SODIUM 40 MG: 40 TABLET, DELAYED RELEASE ORAL at 13:57

## 2017-07-05 RX ADMIN — ALBUTEROL SULFATE 5 MG: 2.5 SOLUTION RESPIRATORY (INHALATION) at 09:23

## 2017-07-05 RX ADMIN — AZITHROMYCIN FOR INJECTION INJECTION, POWDER, LYOPHILIZED, FOR SOLUTION 500 MG: 500 INJECTION INTRAVENOUS at 10:27

## 2017-07-05 RX ADMIN — AMLODIPINE BESYLATE 5 MG: 5 TABLET ORAL at 13:56

## 2017-07-05 RX ADMIN — CEFTRIAXONE SODIUM 1000 MG: 10 INJECTION, POWDER, FOR SOLUTION INTRAVENOUS at 09:52

## 2017-07-05 RX ADMIN — SODIUM CHLORIDE 125 ML/HR: 0.9 INJECTION, SOLUTION INTRAVENOUS at 09:45

## 2017-07-05 RX ADMIN — METOPROLOL TARTRATE 25 MG: 25 TABLET ORAL at 21:45

## 2017-07-06 ENCOUNTER — APPOINTMENT (OUTPATIENT)
Dept: RADIOLOGY | Facility: HOSPITAL | Age: 82
DRG: 177 | End: 2017-07-06
Payer: MEDICARE

## 2017-07-06 ENCOUNTER — APPOINTMENT (INPATIENT)
Dept: RADIOLOGY | Facility: HOSPITAL | Age: 82
DRG: 177 | End: 2017-07-06
Payer: MEDICARE

## 2017-07-06 PROBLEM — J69.0 ASPIRATION PNEUMONIA (HCC): Status: ACTIVE | Noted: 2017-07-05

## 2017-07-06 PROBLEM — N17.9 ACUTE RENAL FAILURE SUPERIMPOSED ON STAGE 3 CHRONIC KIDNEY DISEASE (HCC): Status: ACTIVE | Noted: 2017-02-05

## 2017-07-06 PROBLEM — I50.32 CHRONIC DIASTOLIC CHF (CONGESTIVE HEART FAILURE) (HCC): Chronic | Status: ACTIVE | Noted: 2017-02-05

## 2017-07-06 PROBLEM — J96.01 ACUTE RESPIRATORY FAILURE WITH HYPOXIA (HCC): Status: ACTIVE | Noted: 2017-07-06

## 2017-07-06 PROBLEM — I48.20 CHRONIC ATRIAL FIBRILLATION (HCC): Chronic | Status: ACTIVE | Noted: 2017-02-05

## 2017-07-06 LAB
ANION GAP SERPL CALCULATED.3IONS-SCNC: 13 MMOL/L (ref 4–13)
ATRIAL RATE: 300 BPM
BASOPHILS # BLD AUTO: 0.01 THOUSANDS/ΜL (ref 0–0.1)
BASOPHILS NFR BLD AUTO: 0 % (ref 0–1)
BUN SERPL-MCNC: 44 MG/DL (ref 5–25)
CALCIUM SERPL-MCNC: 8.2 MG/DL (ref 8.3–10.1)
CHLORIDE SERPL-SCNC: 101 MMOL/L (ref 100–108)
CO2 SERPL-SCNC: 23 MMOL/L (ref 21–32)
CREAT SERPL-MCNC: 1.66 MG/DL (ref 0.6–1.3)
EOSINOPHIL # BLD AUTO: 0.01 THOUSAND/ΜL (ref 0–0.61)
EOSINOPHIL NFR BLD AUTO: 0 % (ref 0–6)
ERYTHROCYTE [DISTWIDTH] IN BLOOD BY AUTOMATED COUNT: 14.3 % (ref 11.6–15.1)
GFR SERPL CREATININE-BSD FRML MDRD: 29.1 ML/MIN/1.73SQ M
GLUCOSE SERPL-MCNC: 104 MG/DL (ref 65–140)
HCT VFR BLD AUTO: 32 % (ref 34.8–46.1)
HGB BLD-MCNC: 10.1 G/DL (ref 11.5–15.4)
L PNEUMO1 AG UR QL IA.RAPID: NEGATIVE
LYMPHOCYTES # BLD AUTO: 0.33 THOUSANDS/ΜL (ref 0.6–4.47)
LYMPHOCYTES NFR BLD AUTO: 4 % (ref 14–44)
MAGNESIUM SERPL-MCNC: 2.2 MG/DL (ref 1.6–2.6)
MCH RBC QN AUTO: 29.7 PG (ref 26.8–34.3)
MCHC RBC AUTO-ENTMCNC: 31.6 G/DL (ref 31.4–37.4)
MCV RBC AUTO: 94 FL (ref 82–98)
MONOCYTES # BLD AUTO: 0.9 THOUSAND/ΜL (ref 0.17–1.22)
MONOCYTES NFR BLD AUTO: 11 % (ref 4–12)
NEUTROPHILS # BLD AUTO: 7.01 THOUSANDS/ΜL (ref 1.85–7.62)
NEUTS SEG NFR BLD AUTO: 85 % (ref 43–75)
PHOSPHATE SERPL-MCNC: 4.4 MG/DL (ref 2.3–4.1)
PLATELET # BLD AUTO: 152 THOUSANDS/UL (ref 149–390)
PMV BLD AUTO: 10.3 FL (ref 8.9–12.7)
POTASSIUM SERPL-SCNC: 3.7 MMOL/L (ref 3.5–5.3)
QRS AXIS: -63 DEGREES
QRSD INTERVAL: 112 MS
QT INTERVAL: 340 MS
QTC INTERVAL: 401 MS
RBC # BLD AUTO: 3.4 MILLION/UL (ref 3.81–5.12)
S PNEUM AG UR QL: NEGATIVE
SODIUM SERPL-SCNC: 137 MMOL/L (ref 136–145)
T WAVE AXIS: 121 DEGREES
VENTRICULAR RATE: 84 BPM
WBC # BLD AUTO: 8.26 THOUSAND/UL (ref 4.31–10.16)

## 2017-07-06 PROCEDURE — 87449 NOS EACH ORGANISM AG IA: CPT | Performed by: FAMILY MEDICINE

## 2017-07-06 PROCEDURE — 94640 AIRWAY INHALATION TREATMENT: CPT

## 2017-07-06 PROCEDURE — 92610 EVALUATE SWALLOWING FUNCTION: CPT

## 2017-07-06 PROCEDURE — 85025 COMPLETE CBC W/AUTO DIFF WBC: CPT | Performed by: FAMILY MEDICINE

## 2017-07-06 PROCEDURE — 84100 ASSAY OF PHOSPHORUS: CPT | Performed by: FAMILY MEDICINE

## 2017-07-06 PROCEDURE — 94668 MNPJ CHEST WALL SBSQ: CPT

## 2017-07-06 PROCEDURE — 74220 X-RAY XM ESOPHAGUS 1CNTRST: CPT

## 2017-07-06 PROCEDURE — 80048 BASIC METABOLIC PNL TOTAL CA: CPT | Performed by: FAMILY MEDICINE

## 2017-07-06 PROCEDURE — 83735 ASSAY OF MAGNESIUM: CPT | Performed by: FAMILY MEDICINE

## 2017-07-06 PROCEDURE — 94760 N-INVAS EAR/PLS OXIMETRY 1: CPT

## 2017-07-06 PROCEDURE — 71020 HB CHEST X-RAY 2VW FRONTAL&LATL: CPT

## 2017-07-06 RX ORDER — GUAIFENESIN/DEXTROMETHORPHAN 100-10MG/5
10 SYRUP ORAL EVERY 4 HOURS PRN
Status: DISCONTINUED | OUTPATIENT
Start: 2017-07-06 | End: 2017-07-09 | Stop reason: HOSPADM

## 2017-07-06 RX ADMIN — LEVALBUTEROL HYDROCHLORIDE 1.25 MG: 1.25 SOLUTION, CONCENTRATE RESPIRATORY (INHALATION) at 07:45

## 2017-07-06 RX ADMIN — METRONIDAZOLE 500 MG: 500 INJECTION, SOLUTION INTRAVENOUS at 00:38

## 2017-07-06 RX ADMIN — GUAIFENESIN AND DEXTROMETHORPHAN 10 ML: 100; 10 SYRUP ORAL at 03:14

## 2017-07-06 RX ADMIN — RIVAROXABAN 10 MG: 10 TABLET, FILM COATED ORAL at 10:16

## 2017-07-06 RX ADMIN — METRONIDAZOLE 500 MG: 500 INJECTION, SOLUTION INTRAVENOUS at 08:03

## 2017-07-06 RX ADMIN — METOPROLOL TARTRATE 25 MG: 25 TABLET ORAL at 10:13

## 2017-07-06 RX ADMIN — CIPROFLOXACIN HYDROCHLORIDE 1 DROP: 3 SOLUTION/ DROPS OPHTHALMIC at 06:00

## 2017-07-06 RX ADMIN — ISODIUM CHLORIDE 3 ML: 0.03 SOLUTION RESPIRATORY (INHALATION) at 13:59

## 2017-07-06 RX ADMIN — CIPROFLOXACIN HYDROCHLORIDE 1 DROP: 3 SOLUTION/ DROPS OPHTHALMIC at 17:18

## 2017-07-06 RX ADMIN — METRONIDAZOLE 500 MG: 500 INJECTION, SOLUTION INTRAVENOUS at 17:13

## 2017-07-06 RX ADMIN — ISODIUM CHLORIDE 3 ML: 0.03 SOLUTION RESPIRATORY (INHALATION) at 07:45

## 2017-07-06 RX ADMIN — AZITHROMYCIN MONOHYDRATE 500 MG: 500 INJECTION, POWDER, LYOPHILIZED, FOR SOLUTION INTRAVENOUS at 11:46

## 2017-07-06 RX ADMIN — ISODIUM CHLORIDE 3 ML: 0.03 SOLUTION RESPIRATORY (INHALATION) at 20:08

## 2017-07-06 RX ADMIN — CEFTRIAXONE SODIUM 1000 MG: 10 INJECTION, POWDER, FOR SOLUTION INTRAVENOUS at 10:14

## 2017-07-06 RX ADMIN — CIPROFLOXACIN HYDROCHLORIDE 1 DROP: 3 SOLUTION/ DROPS OPHTHALMIC at 10:14

## 2017-07-06 RX ADMIN — FUROSEMIDE 40 MG: 40 TABLET ORAL at 10:12

## 2017-07-06 RX ADMIN — LEVALBUTEROL HYDROCHLORIDE 1.25 MG: 1.25 SOLUTION, CONCENTRATE RESPIRATORY (INHALATION) at 20:08

## 2017-07-06 RX ADMIN — GUAIFENESIN 1200 MG: 600 TABLET, EXTENDED RELEASE ORAL at 21:39

## 2017-07-06 RX ADMIN — METOPROLOL TARTRATE 25 MG: 25 TABLET ORAL at 17:13

## 2017-07-06 RX ADMIN — GUAIFENESIN 1200 MG: 600 TABLET, EXTENDED RELEASE ORAL at 10:12

## 2017-07-06 RX ADMIN — LEVALBUTEROL HYDROCHLORIDE 1.25 MG: 1.25 SOLUTION, CONCENTRATE RESPIRATORY (INHALATION) at 13:59

## 2017-07-06 RX ADMIN — CIPROFLOXACIN HYDROCHLORIDE 1 DROP: 3 SOLUTION/ DROPS OPHTHALMIC at 14:32

## 2017-07-06 RX ADMIN — PANTOPRAZOLE SODIUM 40 MG: 40 TABLET, DELAYED RELEASE ORAL at 15:55

## 2017-07-06 RX ADMIN — AMLODIPINE BESYLATE 5 MG: 5 TABLET ORAL at 10:13

## 2017-07-06 RX ADMIN — CIPROFLOXACIN HYDROCHLORIDE 1 DROP: 3 SOLUTION/ DROPS OPHTHALMIC at 21:39

## 2017-07-07 LAB
ANION GAP SERPL CALCULATED.3IONS-SCNC: 10 MMOL/L (ref 4–13)
BASOPHILS # BLD AUTO: 0.02 THOUSANDS/ΜL (ref 0–0.1)
BASOPHILS NFR BLD AUTO: 0 % (ref 0–1)
BUN SERPL-MCNC: 44 MG/DL (ref 5–25)
CALCIUM SERPL-MCNC: 8.1 MG/DL (ref 8.3–10.1)
CHLORIDE SERPL-SCNC: 102 MMOL/L (ref 100–108)
CO2 SERPL-SCNC: 24 MMOL/L (ref 21–32)
CREAT SERPL-MCNC: 1.55 MG/DL (ref 0.6–1.3)
EOSINOPHIL # BLD AUTO: 0.07 THOUSAND/ΜL (ref 0–0.61)
EOSINOPHIL NFR BLD AUTO: 1 % (ref 0–6)
ERYTHROCYTE [DISTWIDTH] IN BLOOD BY AUTOMATED COUNT: 14.4 % (ref 11.6–15.1)
GFR SERPL CREATININE-BSD FRML MDRD: 31.5 ML/MIN/1.73SQ M
GLUCOSE SERPL-MCNC: 99 MG/DL (ref 65–140)
HCT VFR BLD AUTO: 31.9 % (ref 34.8–46.1)
HGB BLD-MCNC: 10 G/DL (ref 11.5–15.4)
LYMPHOCYTES # BLD AUTO: 0.45 THOUSANDS/ΜL (ref 0.6–4.47)
LYMPHOCYTES NFR BLD AUTO: 5 % (ref 14–44)
MCH RBC QN AUTO: 29.2 PG (ref 26.8–34.3)
MCHC RBC AUTO-ENTMCNC: 31.3 G/DL (ref 31.4–37.4)
MCV RBC AUTO: 93 FL (ref 82–98)
MONOCYTES # BLD AUTO: 0.86 THOUSAND/ΜL (ref 0.17–1.22)
MONOCYTES NFR BLD AUTO: 10 % (ref 4–12)
NEUTROPHILS # BLD AUTO: 7.51 THOUSANDS/ΜL (ref 1.85–7.62)
NEUTS SEG NFR BLD AUTO: 84 % (ref 43–75)
PLATELET # BLD AUTO: 179 THOUSANDS/UL (ref 149–390)
PMV BLD AUTO: 10.6 FL (ref 8.9–12.7)
POTASSIUM SERPL-SCNC: 3.1 MMOL/L (ref 3.5–5.3)
RBC # BLD AUTO: 3.42 MILLION/UL (ref 3.81–5.12)
SODIUM SERPL-SCNC: 136 MMOL/L (ref 136–145)
WBC # BLD AUTO: 8.91 THOUSAND/UL (ref 4.31–10.16)

## 2017-07-07 PROCEDURE — 85025 COMPLETE CBC W/AUTO DIFF WBC: CPT | Performed by: PHYSICIAN ASSISTANT

## 2017-07-07 PROCEDURE — 97163 PT EVAL HIGH COMPLEX 45 MIN: CPT

## 2017-07-07 PROCEDURE — 94668 MNPJ CHEST WALL SBSQ: CPT

## 2017-07-07 PROCEDURE — G8978 MOBILITY CURRENT STATUS: HCPCS

## 2017-07-07 PROCEDURE — G8979 MOBILITY GOAL STATUS: HCPCS

## 2017-07-07 PROCEDURE — 94640 AIRWAY INHALATION TREATMENT: CPT

## 2017-07-07 PROCEDURE — 80048 BASIC METABOLIC PNL TOTAL CA: CPT | Performed by: PHYSICIAN ASSISTANT

## 2017-07-07 PROCEDURE — 94760 N-INVAS EAR/PLS OXIMETRY 1: CPT

## 2017-07-07 RX ORDER — POTASSIUM CHLORIDE 20 MEQ/1
20 TABLET, EXTENDED RELEASE ORAL DAILY
Status: DISCONTINUED | OUTPATIENT
Start: 2017-07-07 | End: 2017-07-09 | Stop reason: HOSPADM

## 2017-07-07 RX ADMIN — ISODIUM CHLORIDE 3 ML: 0.03 SOLUTION RESPIRATORY (INHALATION) at 19:28

## 2017-07-07 RX ADMIN — ISODIUM CHLORIDE 3 ML: 0.03 SOLUTION RESPIRATORY (INHALATION) at 14:07

## 2017-07-07 RX ADMIN — PANTOPRAZOLE SODIUM 40 MG: 40 TABLET, DELAYED RELEASE ORAL at 17:15

## 2017-07-07 RX ADMIN — RIVAROXABAN 10 MG: 10 TABLET, FILM COATED ORAL at 08:44

## 2017-07-07 RX ADMIN — CIPROFLOXACIN HYDROCHLORIDE 1 DROP: 3 SOLUTION/ DROPS OPHTHALMIC at 21:28

## 2017-07-07 RX ADMIN — LEVALBUTEROL HYDROCHLORIDE 1.25 MG: 1.25 SOLUTION, CONCENTRATE RESPIRATORY (INHALATION) at 19:28

## 2017-07-07 RX ADMIN — ISODIUM CHLORIDE 3 ML: 0.03 SOLUTION RESPIRATORY (INHALATION) at 07:56

## 2017-07-07 RX ADMIN — CEFTRIAXONE SODIUM 1000 MG: 10 INJECTION, POWDER, FOR SOLUTION INTRAVENOUS at 09:34

## 2017-07-07 RX ADMIN — METOPROLOL TARTRATE 25 MG: 25 TABLET ORAL at 08:39

## 2017-07-07 RX ADMIN — GUAIFENESIN 1200 MG: 600 TABLET, EXTENDED RELEASE ORAL at 08:39

## 2017-07-07 RX ADMIN — GUAIFENESIN 1200 MG: 600 TABLET, EXTENDED RELEASE ORAL at 21:28

## 2017-07-07 RX ADMIN — METRONIDAZOLE 500 MG: 500 INJECTION, SOLUTION INTRAVENOUS at 16:30

## 2017-07-07 RX ADMIN — AMLODIPINE BESYLATE 5 MG: 5 TABLET ORAL at 08:39

## 2017-07-07 RX ADMIN — POTASSIUM CHLORIDE 20 MEQ: 1500 TABLET, EXTENDED RELEASE ORAL at 11:12

## 2017-07-07 RX ADMIN — CIPROFLOXACIN HYDROCHLORIDE 1 DROP: 3 SOLUTION/ DROPS OPHTHALMIC at 17:16

## 2017-07-07 RX ADMIN — METOPROLOL TARTRATE 25 MG: 25 TABLET ORAL at 17:16

## 2017-07-07 RX ADMIN — CIPROFLOXACIN HYDROCHLORIDE 1 DROP: 3 SOLUTION/ DROPS OPHTHALMIC at 06:11

## 2017-07-07 RX ADMIN — LEVALBUTEROL HYDROCHLORIDE 1.25 MG: 1.25 SOLUTION, CONCENTRATE RESPIRATORY (INHALATION) at 14:07

## 2017-07-07 RX ADMIN — CIPROFLOXACIN HYDROCHLORIDE 1 DROP: 3 SOLUTION/ DROPS OPHTHALMIC at 11:12

## 2017-07-07 RX ADMIN — METRONIDAZOLE 500 MG: 500 INJECTION, SOLUTION INTRAVENOUS at 08:40

## 2017-07-07 RX ADMIN — METRONIDAZOLE 500 MG: 500 INJECTION, SOLUTION INTRAVENOUS at 00:04

## 2017-07-07 RX ADMIN — AZITHROMYCIN MONOHYDRATE 500 MG: 500 INJECTION, POWDER, LYOPHILIZED, FOR SOLUTION INTRAVENOUS at 12:56

## 2017-07-07 RX ADMIN — LEVALBUTEROL HYDROCHLORIDE 1.25 MG: 1.25 SOLUTION, CONCENTRATE RESPIRATORY (INHALATION) at 07:56

## 2017-07-07 RX ADMIN — PANTOPRAZOLE SODIUM 40 MG: 40 TABLET, DELAYED RELEASE ORAL at 06:11

## 2017-07-07 RX ADMIN — GUAIFENESIN AND DEXTROMETHORPHAN 10 ML: 100; 10 SYRUP ORAL at 08:39

## 2017-07-07 RX ADMIN — FUROSEMIDE 40 MG: 40 TABLET ORAL at 08:39

## 2017-07-08 PROCEDURE — 94760 N-INVAS EAR/PLS OXIMETRY 1: CPT

## 2017-07-08 PROCEDURE — 94640 AIRWAY INHALATION TREATMENT: CPT

## 2017-07-08 PROCEDURE — 94668 MNPJ CHEST WALL SBSQ: CPT

## 2017-07-08 RX ADMIN — FUROSEMIDE 40 MG: 40 TABLET ORAL at 08:44

## 2017-07-08 RX ADMIN — CIPROFLOXACIN HYDROCHLORIDE 1 DROP: 3 SOLUTION/ DROPS OPHTHALMIC at 11:06

## 2017-07-08 RX ADMIN — AZITHROMYCIN MONOHYDRATE 500 MG: 500 INJECTION, POWDER, LYOPHILIZED, FOR SOLUTION INTRAVENOUS at 12:02

## 2017-07-08 RX ADMIN — CIPROFLOXACIN HYDROCHLORIDE 1 DROP: 3 SOLUTION/ DROPS OPHTHALMIC at 06:30

## 2017-07-08 RX ADMIN — LEVALBUTEROL HYDROCHLORIDE 1.25 MG: 1.25 SOLUTION, CONCENTRATE RESPIRATORY (INHALATION) at 19:17

## 2017-07-08 RX ADMIN — CIPROFLOXACIN HYDROCHLORIDE 1 DROP: 3 SOLUTION/ DROPS OPHTHALMIC at 22:30

## 2017-07-08 RX ADMIN — PANTOPRAZOLE SODIUM 40 MG: 40 TABLET, DELAYED RELEASE ORAL at 06:30

## 2017-07-08 RX ADMIN — LEVALBUTEROL HYDROCHLORIDE 1.25 MG: 1.25 SOLUTION, CONCENTRATE RESPIRATORY (INHALATION) at 13:59

## 2017-07-08 RX ADMIN — CIPROFLOXACIN HYDROCHLORIDE 1 DROP: 3 SOLUTION/ DROPS OPHTHALMIC at 16:35

## 2017-07-08 RX ADMIN — LEVALBUTEROL HYDROCHLORIDE 1.25 MG: 1.25 SOLUTION, CONCENTRATE RESPIRATORY (INHALATION) at 08:32

## 2017-07-08 RX ADMIN — METRONIDAZOLE 500 MG: 500 INJECTION, SOLUTION INTRAVENOUS at 08:49

## 2017-07-08 RX ADMIN — PANTOPRAZOLE SODIUM 40 MG: 40 TABLET, DELAYED RELEASE ORAL at 16:36

## 2017-07-08 RX ADMIN — METOPROLOL TARTRATE 25 MG: 25 TABLET ORAL at 08:44

## 2017-07-08 RX ADMIN — METRONIDAZOLE 500 MG: 500 INJECTION, SOLUTION INTRAVENOUS at 16:36

## 2017-07-08 RX ADMIN — METRONIDAZOLE 500 MG: 500 INJECTION, SOLUTION INTRAVENOUS at 00:36

## 2017-07-08 RX ADMIN — CIPROFLOXACIN HYDROCHLORIDE 1 DROP: 3 SOLUTION/ DROPS OPHTHALMIC at 17:51

## 2017-07-08 RX ADMIN — AMLODIPINE BESYLATE 5 MG: 5 TABLET ORAL at 08:44

## 2017-07-08 RX ADMIN — RIVAROXABAN 10 MG: 10 TABLET, FILM COATED ORAL at 08:44

## 2017-07-08 RX ADMIN — METOPROLOL TARTRATE 25 MG: 25 TABLET ORAL at 17:50

## 2017-07-08 RX ADMIN — GUAIFENESIN 1200 MG: 600 TABLET, EXTENDED RELEASE ORAL at 08:43

## 2017-07-08 RX ADMIN — ISODIUM CHLORIDE 3 ML: 0.03 SOLUTION RESPIRATORY (INHALATION) at 13:59

## 2017-07-08 RX ADMIN — POTASSIUM CHLORIDE 20 MEQ: 1500 TABLET, EXTENDED RELEASE ORAL at 08:44

## 2017-07-08 RX ADMIN — CEFTRIAXONE SODIUM 1000 MG: 10 INJECTION, POWDER, FOR SOLUTION INTRAVENOUS at 11:01

## 2017-07-08 RX ADMIN — ISODIUM CHLORIDE 3 ML: 0.03 SOLUTION RESPIRATORY (INHALATION) at 08:32

## 2017-07-08 RX ADMIN — ISODIUM CHLORIDE 3 ML: 0.03 SOLUTION RESPIRATORY (INHALATION) at 19:17

## 2017-07-09 VITALS
WEIGHT: 174.16 LBS | HEART RATE: 68 BPM | RESPIRATION RATE: 18 BRPM | DIASTOLIC BLOOD PRESSURE: 65 MMHG | TEMPERATURE: 98.7 F | BODY MASS INDEX: 29.73 KG/M2 | HEIGHT: 64 IN | OXYGEN SATURATION: 94 % | SYSTOLIC BLOOD PRESSURE: 144 MMHG

## 2017-07-09 LAB
ANION GAP SERPL CALCULATED.3IONS-SCNC: 8 MMOL/L (ref 4–13)
BUN SERPL-MCNC: 33 MG/DL (ref 5–25)
CALCIUM SERPL-MCNC: 8.2 MG/DL (ref 8.3–10.1)
CHLORIDE SERPL-SCNC: 105 MMOL/L (ref 100–108)
CO2 SERPL-SCNC: 26 MMOL/L (ref 21–32)
CREAT SERPL-MCNC: 1.5 MG/DL (ref 0.6–1.3)
ERYTHROCYTE [DISTWIDTH] IN BLOOD BY AUTOMATED COUNT: 14.7 % (ref 11.6–15.1)
GFR SERPL CREATININE-BSD FRML MDRD: 32.7 ML/MIN/1.73SQ M
GLUCOSE SERPL-MCNC: 106 MG/DL (ref 65–140)
HCT VFR BLD AUTO: 32.5 % (ref 34.8–46.1)
HGB BLD-MCNC: 10.1 G/DL (ref 11.5–15.4)
MCH RBC QN AUTO: 29.1 PG (ref 26.8–34.3)
MCHC RBC AUTO-ENTMCNC: 31.1 G/DL (ref 31.4–37.4)
MCV RBC AUTO: 94 FL (ref 82–98)
PLATELET # BLD AUTO: 239 THOUSANDS/UL (ref 149–390)
PMV BLD AUTO: 10.1 FL (ref 8.9–12.7)
POTASSIUM SERPL-SCNC: 3.5 MMOL/L (ref 3.5–5.3)
RBC # BLD AUTO: 3.47 MILLION/UL (ref 3.81–5.12)
SODIUM SERPL-SCNC: 139 MMOL/L (ref 136–145)
WBC # BLD AUTO: 6.84 THOUSAND/UL (ref 4.31–10.16)

## 2017-07-09 PROCEDURE — 85027 COMPLETE CBC AUTOMATED: CPT | Performed by: PHYSICIAN ASSISTANT

## 2017-07-09 PROCEDURE — 94668 MNPJ CHEST WALL SBSQ: CPT

## 2017-07-09 PROCEDURE — 94760 N-INVAS EAR/PLS OXIMETRY 1: CPT

## 2017-07-09 PROCEDURE — 80048 BASIC METABOLIC PNL TOTAL CA: CPT | Performed by: PHYSICIAN ASSISTANT

## 2017-07-09 PROCEDURE — 94640 AIRWAY INHALATION TREATMENT: CPT

## 2017-07-09 RX ORDER — FUROSEMIDE 40 MG/1
40 TABLET ORAL DAILY
Qty: 30 TABLET | Refills: 0 | Status: SHIPPED | OUTPATIENT
Start: 2017-07-09 | End: 2018-10-17 | Stop reason: SDUPTHER

## 2017-07-09 RX ORDER — CEFDINIR 300 MG/1
300 CAPSULE ORAL EVERY 24 HOURS
Qty: 2 CAPSULE | Refills: 0 | Status: SHIPPED | OUTPATIENT
Start: 2017-07-10 | End: 2017-07-12

## 2017-07-09 RX ORDER — POTASSIUM CHLORIDE 20 MEQ/1
20 TABLET, EXTENDED RELEASE ORAL DAILY
Qty: 30 TABLET | Refills: 0 | Status: SHIPPED | OUTPATIENT
Start: 2017-07-09 | End: 2018-10-17 | Stop reason: SDUPTHER

## 2017-07-09 RX ORDER — PANTOPRAZOLE SODIUM 40 MG/1
40 TABLET, DELAYED RELEASE ORAL
Qty: 60 TABLET | Refills: 0 | Status: SHIPPED | OUTPATIENT
Start: 2017-07-09 | End: 2019-07-08 | Stop reason: SDUPTHER

## 2017-07-09 RX ORDER — METRONIDAZOLE 500 MG/1
500 TABLET ORAL EVERY 8 HOURS SCHEDULED
Qty: 7 TABLET | Refills: 0 | Status: SHIPPED | OUTPATIENT
Start: 2017-07-09 | End: 2017-07-12

## 2017-07-09 RX ADMIN — METRONIDAZOLE 500 MG: 500 INJECTION, SOLUTION INTRAVENOUS at 00:36

## 2017-07-09 RX ADMIN — METOPROLOL TARTRATE 25 MG: 25 TABLET ORAL at 09:10

## 2017-07-09 RX ADMIN — POTASSIUM CHLORIDE 20 MEQ: 1500 TABLET, EXTENDED RELEASE ORAL at 09:10

## 2017-07-09 RX ADMIN — CIPROFLOXACIN HYDROCHLORIDE 1 DROP: 3 SOLUTION/ DROPS OPHTHALMIC at 07:00

## 2017-07-09 RX ADMIN — LEVALBUTEROL HYDROCHLORIDE 1.25 MG: 1.25 SOLUTION, CONCENTRATE RESPIRATORY (INHALATION) at 07:47

## 2017-07-09 RX ADMIN — LEVALBUTEROL HYDROCHLORIDE 1.25 MG: 1.25 SOLUTION, CONCENTRATE RESPIRATORY (INHALATION) at 13:44

## 2017-07-09 RX ADMIN — GUAIFENESIN 1200 MG: 600 TABLET, EXTENDED RELEASE ORAL at 00:37

## 2017-07-09 RX ADMIN — ISODIUM CHLORIDE 3 ML: 0.03 SOLUTION RESPIRATORY (INHALATION) at 13:44

## 2017-07-09 RX ADMIN — METRONIDAZOLE 500 MG: 500 INJECTION, SOLUTION INTRAVENOUS at 09:11

## 2017-07-09 RX ADMIN — ISODIUM CHLORIDE 3 ML: 0.03 SOLUTION RESPIRATORY (INHALATION) at 07:47

## 2017-07-09 RX ADMIN — FUROSEMIDE 40 MG: 40 TABLET ORAL at 09:10

## 2017-07-09 RX ADMIN — AZITHROMYCIN MONOHYDRATE 500 MG: 500 INJECTION, POWDER, LYOPHILIZED, FOR SOLUTION INTRAVENOUS at 12:24

## 2017-07-09 RX ADMIN — RIVAROXABAN 10 MG: 10 TABLET, FILM COATED ORAL at 09:15

## 2017-07-09 RX ADMIN — AMLODIPINE BESYLATE 5 MG: 5 TABLET ORAL at 09:10

## 2017-07-09 RX ADMIN — PANTOPRAZOLE SODIUM 40 MG: 40 TABLET, DELAYED RELEASE ORAL at 07:01

## 2017-07-09 RX ADMIN — CEFTRIAXONE SODIUM 1000 MG: 10 INJECTION, POWDER, FOR SOLUTION INTRAVENOUS at 11:19

## 2017-07-09 RX ADMIN — GUAIFENESIN 1200 MG: 600 TABLET, EXTENDED RELEASE ORAL at 09:10

## 2017-07-09 RX ADMIN — CIPROFLOXACIN HYDROCHLORIDE 1 DROP: 3 SOLUTION/ DROPS OPHTHALMIC at 11:23

## 2017-07-10 LAB
BACTERIA BLD CULT: NORMAL
BACTERIA BLD CULT: NORMAL

## 2017-07-13 ENCOUNTER — ALLSCRIPTS OFFICE VISIT (OUTPATIENT)
Dept: OTHER | Facility: OTHER | Age: 82
End: 2017-07-13

## 2017-08-02 ENCOUNTER — ALLSCRIPTS OFFICE VISIT (OUTPATIENT)
Dept: OTHER | Facility: OTHER | Age: 82
End: 2017-08-02

## 2017-09-18 ENCOUNTER — ALLSCRIPTS OFFICE VISIT (OUTPATIENT)
Dept: OTHER | Facility: OTHER | Age: 82
End: 2017-09-18

## 2017-10-19 ENCOUNTER — ALLSCRIPTS OFFICE VISIT (OUTPATIENT)
Dept: OTHER | Facility: OTHER | Age: 82
End: 2017-10-19

## 2017-10-19 ENCOUNTER — GENERIC CONVERSION - ENCOUNTER (OUTPATIENT)
Dept: OTHER | Facility: OTHER | Age: 82
End: 2017-10-19

## 2017-11-01 DIAGNOSIS — N18.30 CHRONIC KIDNEY DISEASE, STAGE III (MODERATE) (HCC): ICD-10-CM

## 2017-11-01 DIAGNOSIS — N18.9 CHRONIC KIDNEY DISEASE: ICD-10-CM

## 2017-11-02 ENCOUNTER — ALLSCRIPTS OFFICE VISIT (OUTPATIENT)
Dept: OTHER | Facility: OTHER | Age: 82
End: 2017-11-02

## 2017-11-03 NOTE — PROGRESS NOTES
Assessment  1  Benign hypertension with chronic kidney disease, stage III (403 10,585 3) (I12 9,N18 3)   2  Anemia in CKD (chronic kidney disease) (285 21) (N18 9,D63 1)   3  CKD (chronic kidney disease), stage III (585 3) (N18 3)   4  Proteinuria (791 0) (R80 9)   5  Secondary hyperparathyroidism (588 81) (N25 81)    Plan  Anemia in CKD (chronic kidney disease)    · (1) FERRITIN; Status:Active; Requested for:01May2018; Perform:[facility]; ITU:[id number];PNLCRDZ; Benjaman Ala in CKD (chronic kidney disease); Ordered By:Zelalem Aponte;   · (1) IRON SATURATION %, TIBC; Status:Active; Requested for:01May2018; Perform:[facility]; IYQ:11WCL7061;YCBYPXZ; Benjaman Ala in CKD (chronic kidney disease); Ordered By:Zelalem Aponte;  CKD (chronic kidney disease), stage III    · (1) BASIC METABOLIC PROFILE; Status:Active; Requested for:01May2018; Perform:[facility]; GSL:[id number];UPJDVHZ; For:CKD (chronic kidney disease), stage III; Ordered By:Zelalem Aponte;   · (1) CBC/ PLT (NO DIFF); Status:Active; Requested for:01May2018; Perform:[facility]; NIB:90HPK4025;JBKFRTM; For:CKD (chronic kidney disease), stage III; Ordered By:Zelalem Aponte;   · (1) PHOSPHORUS; Status:Active; Requested for:01May2018; Perform:[facility]; GFJ:[id number];YIDHWNX; For:CKD (chronic kidney disease), stage III; Ordered By:Zelalem Aponte;   · (1) PTH N-TERMINAL (INTACT); Status:Active; Requested for:01May2018; Perform:[facility]; GLL:[id number];FWFRWGC; For:CKD (chronic kidney disease), stage III; Ordered By:Zelalem Aponte;   · (1) URINE PROTEIN CREATININE RATIO; Status:Active; Requested for:14Kql9417; Perform:[facility]; VEE:93EWS1239;TBXIEDE; For:CKD (chronic kidney disease), stage III; Ordered By:Zelalem Aponte;    Discussion/Summary    Chronic kidney disease stage III (baseline serum creatinine 1 4-1 5, previous baseline used to be 1 7-1 8 )CKD likely due to underlying hypertensive nephrosclerosisLast serum creatinine 1 4 in November 2017 overall stable  patient had refused to consider dialysis in the future during my previous discussions  Avoid nephrotoxins, NSAIDs or IV contrastWe'll repeat BMP before next visitHer renal ultrasound done in November 2015 shows right atrophied kidney with 7 9 cm in size, left kidney is normal  kidneyRenal ultrasound shows right atrophied kidney with 7 9 cm in size and left side kidney 12 1 cm? Component of renal artery stenosis causing ischemic nephropathy (although she does not have any history of stroke, coronary artery disease, peripheral artery disease documented)Given stable renal function, we'll continue to monitor for now  edema, resolved  Currently patient taking Lasix 40 mg by mouth daily along with potassium supplement  Continue same for now  Advised to call back if has greater than 5 pound weight gain in 2 days  Clinically she seems euvolemic without any significant edema  Strict salt restricted diet  Her blood pressure is controlled today in the officeContinue current antihypertensive regimen with amlodipine 5 mg by mouth daily, metoprolol 25 mg by mouth twice a day, Lasix 40 mg by mouth daily  Goal systolic blood pressure 684K to 140s  D deficiencyResolved, last vitamin D level 39 in July 2016  Consider repeat vitamin D level during next visit  hyperparathyroidismPTH level continue to improved to 71 from previous 108  Repeat PTH before next visit  No PTH results available since last visit  anemia/ iron deficiency anemiaLast iron saturation 19%  Patient did not get iron saturation done since last visit  She was recently started on by mouth iron supplement  We'll repeat iron studies before next visit  Continue same for now  Hemoglobin overall stable at 11   proteinuria, improving, last UPC ratio 140 mg, nonsignificant Continue to monitor  Likely secondary to long-term hypertension     The patient was counseled regarding instructions for management,-- risk factor reductions,-- importance of compliance with treatment  Patient is able to Self-Care  Possible side effects of new medications were reviewed with the patient/guardian today  The treatment plan was reviewed with the patient/guardian  The patient/guardian understands and agrees with the treatment plan      Reason For Visit  Regular follow-up of CK D      History of Present Illness  Patient is 79-year-old female with significant medical issues of CK D stage IV baseline creatinine 1 4-1 5, (previously 1 7-1 8), hypertension, A  fib, sick sinus syndrome, status post PPM, comes for regular follow-up of CK D  she was hospitalized in July 2017 due to aspiration pneumonia and since then she has been at [facility]  She has been feeling well  Denies any worsening dyspnea or leg edema  She has urinary incontinence issues  No new complaint  She denies any recent NSAID exposure  Her serum creatinine is overall stable  Her previous renal ultrasound showed right atrophied kidney  Her leg edema is overall much better than before and currently patient is taking Lasix 40 mg daily along with potassium supplement  was recently started on by mouth iron supplement  She has a good appetite and has been likely gaining solid weight  Review of Systems    Constitutional: recent 5-6 lb weight gain, but-- no fever,-- no chills-- and-- no fatigue  Integumentary: no rashes  Gastrointestinal: no abdominal pain,-- no nausea,-- no diarrhea-- and-- no vomiting  Respiratory: no shortness of breath-- and-- no cough  Cardiovascular: no orthopnea,-- no chest pain,-- no palpitations-- and-- no lower extremity edema  Musculoskeletal: no joint pain  Neurological: no lightheadedness-- and-- no dizziness  Genitourinary: no dysuria,-- no hematuria,-- no change in urinary frequency-- and-- no incomplete emptying of bladder  Eyes: no dryness of the eyes  ENT: no nasal discharge  Psychiatric: no sleep disturbances,-- no anxiety-- and-- no depression  Active Problems  1  Acute diastolic congestive heart failure (428 31,428 0) (I50 31)   2  Acute non-recurrent maxillary sinusitis (461 0) (J01 00)   3  Anemia (285 9) (D64 9)   4  Anemia in CKD (chronic kidney disease) (285 21) (N18 9,D63 1)   5  Anxiety (300 00) (F41 9)   6  Aspiration pneumonia (507 0) (J69 0)   7  Atrial fibrillation (427 31) (I48 91)   8  Valverde's esophagus (530 85) (K22 70)   9  Benign hypertension with chronic kidney disease, stage III (403 10,585 3) (I12 9,N18 3)   10  Bowel obstruction (560 9) (K56 609)   11  Cardiac arrhythmia (427 9) (I49 9)   12  Cardiomyopathy (425 4) (I42 9)   13  Change in mental status (780 97) (R41 82)   14  CHF (congestive heart failure) (428 0) (I50 9)   15  CKD (chronic kidney disease), stage III (585 3) (N18 3)   16  Diastolic dysfunction (735 4) (I51 9)   17  Dyspnea (786 09) (R06 00)   18  Elevated pancreatic enzyme (790 5) (R74 8)   19  Esophageal ulcer (530 20) (K22 10)   20  Fatigue (780 79) (R53 83)   21  Flu vaccine need (V04 81) (Z23)   22  Gastric volvulus (537 89) (K31 89)   23  Gastroesophageal reflux disease without esophagitis (530 81) (K21 9)   24  Headache (784 0) (R51)   25  Hearing loss due to cerumen impaction, right (389 8,380 4) (H61 21)   26  Hiatal hernia (553 3) (K44 9)   27  Hypercholesteremia (272 0) (E78 00)   28  Hyperglycemia (790 29) (R73 9)   29  Inquiry And Counseling: Hearing Impairment (V65 49)   30  Insomnia (780 52) (G47 00)   31  Iron deficiency anemia (280 9) (D50 9)   32  Loss of height (781 91) (R29 890)   33  Medicare annual wellness visit, initial (V70 0) (Z00 00)   34  Near syncope (780 2) (R55)   35  Osteoarthritis of both hands (715 94) (M19 041,M19 042)   36  Overactive bladder (596 51) (N32 81)   37  Proteinuria (791 0) (R80 9)   38  Scleral icterus (782 4) (R17)   39  Screening for genitourinary condition (V81 6) (Z13 89)   40   Screening for neurological condition (V80 09) (Z13 89)   41  Secondary hyperparathyroidism (588 81) (N25 81)   42  SSS (sick sinus syndrome) (427 81) (I49 5)   43  Vitamin D deficiency (268 9) (E55 9)    Past Medical History    The active problems and past medical history were reviewed and updated today  Surgical History  1  Inquiry And Counseling: Hearing Impairment (V65 49)   2  History of Pacemaker Placement    The surgical history was reviewed and updated today  Family History  Mother    1  Denied: Family history of Colon cancer   2  Denied: Family history of Crohn's disease without complication, unspecified   gastrointestinal tract location   3  Denied: Family history of liver disease  Father    4  Family history of Arteriosclerotic coronary artery disease   5  Denied: Family history of Colon cancer   6  Denied: Family history of Crohn's disease without complication, unspecified   gastrointestinal tract location   7  Denied: Family history of liver disease  Daughter    6  Family history of kidney stone (V18 69) (Z84 1)  Family History    9  Family history of thyroid disease (V18 19) (Z83 49)    The family history was reviewed and updated today  Social History   · Always uses seat belt   · Daily Coffee Consumption (___ Cups/Day)   · Daily Cola Consumption (___ Cans/Day)   · Daily Tea Consumption (___ Cups/Day)   · Dental care, regularly   · Education Level: Less than high school   · Denied: History of Exercise frequency (times/week)   · Denied: History of Guns in the Home: Not stored in locked cabinet   · Has 3 children   · Marital History -    · Never A Smoker   · Patient has living will (V44 89) (Z78 9)   · Pets / animals   · Power of  in existence   · Retired   · Denied: History of Sexually active   · Social alcohol use (Z78 9)  The social history was reviewed and updated today  The social history was reviewed and is unchanged  Current Meds   1   AmLODIPine Besylate 5 MG Oral Tablet; take 1 tablet by mouth daily; Therapy: 18RXH6187 to (Evaluate:74Tkc9860)  Requested for: 27Jun2017; Last   CO:42VDB9650 Ordered   2  Iron 325 (65 Fe) MG Oral Tablet; Take 1 tablet twice daily Recorded   3  Lasix 40 MG Oral Tablet; take 1 tablet once a day Recorded   4  Melatonin 5 MG Oral Tablet; TAKE 2 TABLET Bedtime; Therapy: 54IJO6097 to Recorded   5  Metoprolol Tartrate 25 MG Oral Tablet; take 1 tablet by mouth twice a day; Therapy: 47TDP7046 to (Leo Chan)  Requested for: 97QRD4408; Last   Rx:28Nov2016 Ordered   6  Miconazole Powder; Apply under right breast BID Recorded   7  Multiple Vitamins/Womens Oral Tablet; TAKE 1 TABLET DAILY; Therapy: 61JBT2995 to Recorded   8  Omeprazole 20 MG Oral Capsule Delayed Release; TAKE 1 CAPSULE TWICE DAILY; Therapy: 36NQM9278 to Recorded   9  Potassium Chloride ER 20 MEQ Oral Tablet Extended Release; Take 1 tablet once daily; Therapy: 02CKX5896 to ([id number] 08 43) Recorded   10  Senna S 8 6-50 MG Oral Tablet; TAKE 2 TABLETS BID Recorded   11  Xarelto 10 MG Oral Tablet; Take 1 tablet daily; Therapy: 46GLV8688 to Recorded    The medication list was reviewed and updated today  Allergies  1  Penicillins   2  Amoxicillin CAPS    Vitals  Vital Signs    Recorded: 41DEC4892 10:56AM Recorded: 32LXW5929 10:28AM   Heart Rate  68   Systolic 308, LUE, Sitting 122, Sitting   Diastolic 66, LUE, Sitting 70, Sitting   Height  5 ft 2 in   Weight  173 lb    BMI Calculated  31 64   BSA Calculated  1 8     Physical Exam    Constitutional: General appearance: No acute distress, well appearing and well nourished  ENT: External ears and nose appear normal      Eyes: Anicteric sclerae  JVD:  No JVD present  Pulmonary: Respiratory effort: No increased work of breathing or signs of respiratory distress  -- Auscultation of lungs: Clear to auscultation  no rales or crackles were heard bilaterally  no rhonchi  no wheezing  no diminished breath sounds  Cardiovascular: Auscultation of heart: Normal rate and rhythm, normal S1 and S2, without murmurs  Abdomen: Non-tender, no masses  -- soft, ND, BS+nt  Extremities: No cyanosis, clubbing or edema  Rash: No rash present     Neurologic: Non Focal      Psychiatric: Orientation to person, place, and time: Normal  -- and-- Mood and affect: Normal        Future Appointments    Date/Time Provider Specialty Site   03/12/2018 01:30 PM Cardiology, [address] [facility]   12/18/2017 09:30 AM Cardiology, Device Remote  [address]     Signatures   Electronically signed by : DAVIDE Malave ; Nov 2 2017 11:07AM EST                       (Author)

## 2017-11-24 ENCOUNTER — GENERIC CONVERSION - ENCOUNTER (OUTPATIENT)
Dept: OTHER | Facility: OTHER | Age: 82
End: 2017-11-24

## 2017-12-18 ENCOUNTER — GENERIC CONVERSION - ENCOUNTER (OUTPATIENT)
Dept: OTHER | Facility: OTHER | Age: 82
End: 2017-12-18

## 2017-12-18 ENCOUNTER — ALLSCRIPTS OFFICE VISIT (OUTPATIENT)
Dept: OTHER | Facility: OTHER | Age: 82
End: 2017-12-18

## 2018-01-11 NOTE — MISCELLANEOUS
Provider Comments  Provider Comments:   Patient no showed for appt 4/4/17      Signatures   Electronically signed by : DAVIDE Taveras ; Apr 13 2017  4:07PM EST                       (Author)

## 2018-01-11 NOTE — PROGRESS NOTES
Assessment    1  Valverde's esophagus (530 85) (K22 70)   2  Esophageal ulcer (530 20) (K22 10)   3  Hiatal hernia (553 3) (K44 9)   4  GERD (gastroesophageal reflux disease) (530 81) (K21 9)    Plan  Valverde's esophagus    · EGD ; every 3 months; Last 35QQH1391; Next 29PEA1003; Status:Active   For: 'Valverde's esophagus'Ordered By: 'Sai Becerra'  Valverde's esophagus, Esophageal ulcer, GERD (gastroesophageal reflux disease),  Hiatal hernia    · EGD; Status:Active; Requested for:21Tbw8639;    Perform:[facility]; Due:16Apr2016; Last Updated Modesta Garland; 1/20/2016 3:01:28 PM;Ordered; For:Valverde's esophagus, Esophageal ulcer, GERD (gastroesophageal reflux disease), Hiatal hernia; Ordered By:Virginia Wells; Flu vaccine need    · Prevnar 13 Intramuscular Suspension   For: Flu vaccine need; Ordered By:Ida Cantu; Effective Date:20Jan2016; Administered by: Yessenia Bonilla: 1/20/2016 1:35:00 PM; Last Updated By: Yessenia Bonilla; 1/20/2016 1:36:15 PM    Discussion/Summary  Discussion Summary:     #1  Large paraesophageal hernia with volvulus and obstruction status post conservative therapy  Discussed with patient and her daughter in detail about the surgery for possible recurrence episodes  Patient is refusing because of her age  #2  Diffuse distal esophageal ulceration with abnormal mucosa and the biopsies were positive for Valverde's  Continue omeprazole and Carafate  Schedule for repeat upper endoscopy in one to 2 months  #3  Patient was explained about the lifestyle and dietary modifications  Advised to avoid fatty foods, chocolates, caffeine, alcohol and any other triggering foods  Avoid eating for at least 3 hours before going to bed  #4  Patient was explained about the risks and benefits of the procedure  Risks including but not limited to bleeding, infection, perforation were explained in detail   Also explained about less than 100% sensitivity with the exam and other alternatives  Understands and agrees with treatment plan: The treatment plan was reviewed with the patient/guardian  The patient/guardian understands and agrees with the treatment plan   Counseling Documentation With Imm: The patient was counseled regarding instructions for management, risk factor reductions, patient and family education, risks and benefits of treatment options, importance of compliance with treatment  Chief Complaint  Chief Complaint Free Text Note Form:   Hospital followup      History of Present Illness  HPI:   Ms Alaina Bence was brought in by her daughter  She was hospitalized in November 2015 because of incarcerated paraesophageal hernia  She was mainly is conservatively with NG tube suction and repeat endoscopy showed reduced paraesophageal hernia  There is diffuse ulceration in the mid to distal esophagus with inflamed abnormal raised mucosa and the biopsies were positive for Valverde's  She has been doing well on omeprazole and Carafate regularly  She denies any abdominal pain, nausea vomiting  Denies any heartburn or acid reflux  She has regular bowel movements, denies any blood or mucus in the stool  History Reviewed: The history was obtained today from the patient and I agree with the documented history  Review of Systems  Complete-Female GI Adult:   Constitutional: No fever, no chills, feels well, no tiredness, no recent weight gain or weight loss  Eyes: No complaints of eye pain, no red eyes, no eyesight problems, no discharge, no dry eyes, no itching of eyes  ENT: no complaints of earache, no loss of hearing, no nose bleeds, no nasal discharge, no sore throat, no hoarseness  Cardiovascular: No complaints of slow heart rate, no fast heart rate, no chest pain, no palpitations, no leg claudication, no lower extremity edema  Respiratory: No complaints of shortness of breath, no wheezing, no cough, no SOB on exertion, no orthopnea, no PND     Gastrointestinal: as noted in HPI  Genitourinary: No complaints of dysuria, no incontinence, no pelvic pain, no dysmenorrhea, no vaginal discharge or bleeding  Musculoskeletal: No complaints of arthralgias, no myalgias, no joint swelling or stiffness, no limb pain or swelling  Integumentary: No complaints of skin rash or lesions, no itching, no skin wounds, no breast pain or lump  Neurological: No complaints of headache, no confusion, no convulsions, no numbness, no dizziness or fainting, no tingling, no limb weakness, no difficulty walking  Psychiatric: Not suicidal, no sleep disturbance, no anxiety or depression, no change in personality, no emotional problems  Endocrine: No complaints of proptosis, no hot flashes, no muscle weakness, no deepening of the voice, no feelings of weakness  Hematologic/Lymphatic: No complaints of swollen glands, no swollen glands in the neck, does not bleed easily, does not bruise easily  Active Problems    1  Achilles tendinitis (726 71) (M76 60)   2  Acute diastolic congestive heart failure (428 31,428 0) (I50 31)   3  Acute renal insufficiency (593 9) (N28 9)   4  Acute URI (465 9) (J06 9)   5  Allergic rhinitis due to pollen (477 0) (J30 1)   6  Anemia (285 9) (D64 9)   7  Anxiety (300 00) (F41 9)   8  Atrial fibrillation (427 31) (I48 91)   9  Valverde's esophagus (530 85) (K22 70)   10  Benign hypertension with chronic kidney disease, 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,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,N18 4)   11  Bursitis, hip, right (726 5) (M70 71)   12  Cardiac arrhythmia (427 9) (I49 9)   13  Cardiomyopathy (425 4) (I42 9)   14  Cellulitis (682 9) (L03 90)   15  Change in mental status (780 97) (R41 82)   16  CHF (congestive heart failure) (428 0) (I50 9)   17  Diastolic dysfunction (053 8) (I51 9)   18  Dyspnea (786 09) (R06 00)   19  Edema (782 3) (R60 9)   20  Esophageal ulcer (530 20) (K22 10)   21  Fatigue (780 79) (R53 83)   22  Flu vaccine need (V04 81) (Z23)   23  Hiatal hernia (553 3) (K44 9)   24  Hypercholesteremia (272 0) (E78 0)   25  Hypertension (401 9) (I10)   26  Hypokalemia (276 8) (E87 6)   27  Impacted cerumen, unspecified laterality (380 4) (H61 20)   28  Inquiry And Counseling: Hearing Impairment (V65 49)   29  Insomnia (780 52) (G47 00)   30  Near syncope (780 2) (R55)   31  Screening for genitourinary condition (V81 6) (Z13 89)   32  Screening for neurological condition (V80 09) (Z13 89)   33  Secondary hyperparathyroidism (588 81) (N25 81)   34  SSS (sick sinus syndrome) (427 81) (I49 5)   35  Vitamin D deficiency (268 9) (E55 9)   36  Weakness (780 79) (R53 1)    Past Medical History  Active Problems And Past Medical History Reviewed: The active problems and past medical history were reviewed and updated today  Surgical History    1  Inquiry And Counseling: Hearing Impairment (V65 49)  Surgical History Reviewed: The surgical history was reviewed and updated today  Family History    1  Family history of Arteriosclerotic coronary artery disease    2  Family history of kidney stone (V18 69) (Z84 1)  Family History Reviewed: The family history was reviewed and updated today  Social History    · Denied: History of Alcohol Use (History)   · Daily Coffee Consumption (___ Cups/Day)   · Daily Cola Consumption (___ Cans/Day)   · Daily Tea Consumption (___ Cups/Day)   · Marital History -    · Never A Smoker  Social History Reviewed: The social history was reviewed and updated today  The social history was reviewed and is unchanged  Current Meds   1  AmLODIPine Besylate 5 MG Oral Tablet; TAKE 1 TABLET DAILY; Last Rx:08Ixl0951   Ordered   2  Eliquis 2 5 MG Oral Tablet; Take 1 tablet twice daily; Therapy: 22VIL6274 to (Zee Chowdhury)  Requested for: 68WIY8968; Last   Rx:05Tcj6869 Ordered   3  Furosemide 40 MG Oral Tablet; Take 1 tablet daily; Therapy: 01IRQ2475 to (Evaluate:97Yss7881)  Requested for: 0676 543 19 15; Last   Rx:01Pqj5248 Ordered   4   Metoprolol Tartrate 25 MG Oral Tablet; TAKE 1 TABLET TWICE DAILY; Last Rx:36Zep8556   Ordered   5  Omeprazole 40 MG Oral Capsule Delayed Release; TAKE 1 CAPSULE DAILY; Therapy: 07ZQW3476 to (Evaluate:13Apr2016)  Requested for: 24XCS2371; Last   Rx:14Jan2016 Ordered   6  Potassium Chloride ER 10 MEQ Oral Capsule Extended Release; TAKE 1 CAPSULE   TWICE DAILY  Requested for: 77ADC6422; Last DE:87SHR1744 Ordered   7  PredniSONE 5 MG Oral Tablet; TAKE 3 TABLET Twice daily for 2 days,t then 2 bid for two   days, 1 bid for 2 days,then 1 daily for 2 days and stop; Therapy: 78AQV9249 to (Complete:28Jan2016)  Requested for: 20Jan2016; Last   Rx:20Jan2016 Ordered   8  Sucralfate 1 GM Oral Tablet; TAKE 1 TABLET 4 TIMES DAILY; Last Rx:88Vgy1747 Ordered   9  Vitamin D (Ergocalciferol) 70745 UNIT Oral Capsule; TAKE 1 CAPSULE WEEKLY; Therapy: 98CIN9186 to (Denis Win)  Requested for: 28UYT7891; Last   Rx:18Ipb0653 Ordered  Medication List Reviewed: The medication list was reviewed and updated today  Allergies    1  Amoxicillin CAPS    Vitals  Vital Signs [Data Includes: Current Encounter]    Recorded: 50WLY0071 02:10PM   Heart Rate 74   Systolic 886, LUE, Sitting   Diastolic 68, LUE, Sitting   BP Comments manual b/p   Height 5 ft 2 in   Weight 182 lb    BMI Calculated 33 29   BSA Calculated 1 84     Physical Exam    Constitutional   General appearance: No acute distress, well appearing and well nourished  Eyes   Conjunctiva and lids: No swelling, erythema or discharge  Pupils and irises: Equal, round and reactive to light  Ears, Nose, Mouth, and Throat   External inspection of ears and nose: Normal     Oropharynx: Normal with no erythema, edema, exudate or lesions  Pulmonary   Respiratory effort: No increased work of breathing or signs of respiratory distress  Auscultation of lungs: Clear to auscultation  Cardiovascular   Palpation of heart: Normal PMI, no thrills      Auscultation of heart: Normal rate and rhythm, normal S1 and S2, without murmurs  Examination of extremities for edema and/or varicosities: Normal     Carotid pulses: Normal     Abdomen   Abdomen: Non-tender, no masses  Liver and spleen: No hepatomegaly or splenomegaly  Lymphatic   Palpation of lymph nodes in neck: No lymphadenopathy  Musculoskeletal   Gait and station: Normal     Digits and nails: Normal without clubbing or cyanosis  Inspection/palpation of joints, bones, and muscles: Normal     Skin   Skin and subcutaneous tissue: Normal without rashes or lesions  Psychiatric   Orientation to person, place, and time: Normal     Mood and affect: Normal          Results/Data  Encounter Results   PHQ-2 Adult Depression Screening 20Jan2016 01:36PM User, Ahs     Test Name Result Flag Reference   PHQ-2 Adult Depression Score 0     Q1: 0, Q2: 0   PHQ-2 Adult Depression Screening Negative         Health Management  Valverde's esophagus   EGD; every 3 months; Last 52LQP0293; Next Due: 18BYS2891; Active    Future Appointments    Date/Time Provider Specialty Site   03/02/2016 09:00 AM Dayana Pearl DO Family Medicine [facility]   03/03/2016 01:30 PM Cardiology, [address]   06/08/2016 01:00 PM Cardiology, Device Remote   [facility]   04/11/2016 08:45 AM DAVIDE Han  Gastroenterology Adult [address]   03/10/2016 10:10 AM DAVIDE Garsia   Nephrology  [facility]     Signatures   Electronically signed by : DAVIDE Randall ; Jan 20 2016  6:35PM EST                       (Author)

## 2018-01-12 NOTE — PROCEDURES
Procedures by Amandeep Hernandez RN at 2/9/2017  9:37 AM      Author:  Amandeep Hernandez RN Service:  Interventional Radiology  Author Type:  Registered Nurse    Filed:  2/9/2017 10:13 AM Date of Service:  2/9/2017  9:37 AM Status:  Attested    :  Amandeep Hernandez RN (Registered Nurse)  Cosigner:  Shay Yañez MD at 2/9/2017 10:16 AM      Procedure Orders:       1  Insert PICC line [[id number]] ordered by Amandeep Hernandez RN at 02/09/17 2062               Attestation signed by Shay Yañez MD at 2/9/2017 10:16 AM           I have reviewed the notes, assessments, and/or procedures performed by Clif Mejia RN, I concur with her/his documentation of Rebeca Toribio  I was present for and performed the procedure  PICC Line Insertion  Performed by: Teofilo Neely  Authorized by: Phillip Cervantes     Procedure date/time:  2/9/2017 9:37 AM  Patient location:  Other (comment) (IR)  Universal protocol:     Procedure explained and questions answered to patient or proxy's satisfaction: yes      Relevant documents present and verified: yes      Required blood products, implants, devices, and special equipment  available: yes      Site/side marked: yes      Immediately prior to procedure, a time out was called: yes      Patient identity confirmed:  Verbally with patient and hospital-assigned identification number  Pre-procedure details:     Hand hygiene: Hand hygiene performed prior to insertion      Sterile barrier technique: All elements of maximal sterile technique followed      Skin preparation:  2% chlorhexidine    Skin preparation agent: Skin preparation agent completely dried prior to procedure    Indications:     PICC line indications: vascular access    Anesthesia (see MAR for exact dosages):      Anesthesia method:  Local infiltration    Local anesthetic:  Lidocaine 1% w/o epi  Procedure details:     Location:  Cephalic    Vessel type: vein Laterality:  Right    Approach: percutaneous technique used      Procedural supplies:  Double lumen    Catheter size:  5 Fr    Ultrasound guidance: yes      Sterile ultrasound techniques: Sterile gel and sterile probe covers were used      Number of attempts:  2    Successful placement: yes      Vessel of catheter tip end:  SVC    Total catheter length (cm):  34    Catheter out on skin (cm):  0    Arm circumference:  29  Post-procedure details:     Post-procedure:  Dressing applied    Assessment:  Blood return through all ports and placement verified by x-ray    Patient tolerance of procedure:   Tolerated well, no immediate complications                       Received for:Provider  [facility]   Feb 9 2017 10:16AM [place] Standard Time

## 2018-01-12 NOTE — RESULT NOTES
Verified Results  (1) LIPASE 15JNL0343 12:53PM Bibi Miller   REPORT COMMENT:  FASTING:NO     Test Name Result Flag Reference   LIPASE 81 U/L H 7-60

## 2018-01-13 NOTE — MISCELLANEOUS
3/28/2017       RE: Hailey Alexis  [address]  [facility] [address]     Dear Colleague,    Thank you for referring your patient, Hailey Alexis, to the [facility] at [facility]. Please see a copy of my visit note below.    HISTORY OF PRESENT ILLNESS:  Patient is a pleasant 87-year-old female who I last saw on 11/2015, 17 months prior to today.  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  She is here because her daughter-in-law felt that we should reconsider the possibility of doing surgery because she is now in good health and does not want to do surgery later on when she is not in good health.      We reviewed her current situation; her range of motions of this right knee is 15 degrees to 135.  She reports her pain level as 0-1.  [place] rise which she takes the elevator.  She walks around the house with no ambulatory aid.  She does not walk outside with an ambulatory aid other than somebody's arm when needed.      She recently came back from a bird watching experiencing.  When asked if she had trouble on uneven ground, she said when she did, she just grabbed her son's arm.  In addition to this recent trip to [place], she was in [place] a few weeks prior to that.  In January, she was down in [place] and then further travel to [place].  In December she was in [place].      She freely navigates airplanes, airports and car travel without problems.  She walks on level ground without problems.  Typically when she goes up and down steps, she uses a handrail, but feels that if she has a hand rail, she does not have any difficulty.  She does have confidence in her knee to the extent that it does not interfere with her life.  She has not experienced any falls.      Again, her range of motion is 15 degrees to 135.  She had a valgus deformity which is not passively correctable.  On standing alignment, she has a  Message   Recorded as Task   Date: 11/17/2016 01:09 PM, Created By: Minda Hancock   Task Name: Call Back   Assigned To: Staci Lau   Regarding Patient: Nila Pepper, Status: In Progress   Xiomy Dawkins - 17 Nov 2016 1:09 PM     TASK CREATED  Can you ask patient whether she is taking ferrous sulfate 1 tab BID? She should be on that BID dosing  Also she needs CBC before next visit  Thanks  Staci Lau - 17 Nov 2016 1:37 PM     TASK EDITED  Left message for the patient to call back  Staci Lau - 17 Nov 2016 1:37 PM     TASK IN PROGRESS   Staci Lau - 18 Nov 2016 10:55 AM     TASK EDITED  Left message for the patient to call back  Per Dr Libby Tello can you ask the patient whether she is taking ferrous sulfate 1 tablet BID  CBC before next visit  Spoke with the patient's daughter and she states the patient is taking the tablet twice daily and the lab slip has been mailed out for the patient  Hiram Sosa      Active Problems    1  Abdominal pain (789 00) (R10 9)   2  Achilles tendinitis (726 71) (M76 60)   3  Acid reflux (530 81) (K21 9)   4  Acute diastolic congestive heart failure (428 31,428 0) (I50 31)   5  Acute renal insufficiency (593 9) (N28 9)   6  Acute URI (465 9) (J06 9)   7  Allergic rhinitis due to pollen (477 0) (J30 1)   8  Anemia (285 9) (D64 9)   9  Anemia in CKD (chronic kidney disease) (285 21,585 9) (N18 9,D63 1)   10  Anxiety (300 00) (F41 9)   11  Atrial fibrillation (427 31) (I48 91)   12  Valverde's esophagus (530 85) (K22 70)   13  Benign hypertension with chronic kidney disease, 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,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,N18 4)   14  Bursitis, hip, right (726 5) (M70 71)   15  Cardiac arrhythmia (427 9) (I49 9)   16  Cardiomyopathy (425 4) (I42 9)   17  Cellulitis (682 9) (L03 90)   18  Cervical pain (723 1) (M54 2)   19  Change in mental status (780 97) (R41 82)   20  CHF (congestive heart failure) (428 0) (I50 9)   21   CKD (chronic kidney disease), stage 4 (severe) (585  4) (N18 4)   22  Dehydration (276 51) (E86 0)   23  Diastolic dysfunction (358 5) (I51 9)   24  Dyspnea (786 09) (R06 00)   25  Edema (782 3) (R60 9)   26  Elevated pancreatic enzyme (790 5) (R74 8)   27  Esophageal candidiasis (112 84) (B37 81)   28  Esophageal ulcer (530 20) (K22 10)   29  Fatigue (780 79) (R53 83)   30  Flu vaccine need (V04 81) (Z23)   31  Headache (784 0) (R51)   32  Hearing loss due to cerumen impaction, right (389 8,380 4) (H61 21)   33  Hiatal hernia (553 3) (K44 9)   34  Hypercholesteremia (272 0) (E78 00)   35  Hyperglycemia (790 29) (R73 9)   36  Hypertension (401 9) (I10)   37  Hypokalemia (276 8) (E87 6)   38  Impacted cerumen, unspecified laterality (380 4) (H61 20)   39  Inquiry And Counseling: Hearing Impairment (V65 49)   40  Insomnia (780 52) (G47 00)   41  Laceration of left leg (894 0) (S81 812A)   42  Loss of height (781 91) (R29 890)   43  Medicare annual wellness visit, initial (V70 0) (Z00 00)   44  Near syncope (780 2) (R55)   45  Screening for genitourinary condition (V81 6) (Z13 89)   46  Screening for neurological condition (V80 09) (Z13 89)   47  Secondary hyperparathyroidism (588 81) (N25 81)   48  SSS (sick sinus syndrome) (427 81) (I49 5)   49  Viral gastritis (535 50) (K29 70)   50  Viral gastroenteritis (008 8) (A08 4)   51  Vitamin D deficiency (268 9) (E55 9)   52  Weakness (780 79) (R53 1)   53  Weight loss (783 21) (R63 4)    Current Meds   1  AmLODIPine Besylate 5 MG Oral Tablet; take 1 tablet by mouth daily; Therapy: 43QMI3845 to (Ladon Peabody)  Requested for: 26KHQ4407; Last   Rx:01Jun2016 Ordered   2  Carafate 1 GM/10ML Oral Suspension; TAKE 10 ML 4 times daily ( before each meal and   at bedtime); Therapy: 54NHH8320 to (Renew:27Nov2016)  Requested for: 85ZPH1062; Last   Rx:07Nov2016 Ordered   3  Dexilant 60 MG Oral Capsule Delayed Release; TAKE 1 CAPSULE DAILY EVERY   MORNING BEFORE BREAKFAST;    Therapy: 01PZA6306 to (Evaluate:[id number]) valgus alignment measuring 13 degrees of anatomic valgus.      I believe that overall I do not have much that I could offer her in regards to improvement in quality of life at this point in time.  Certainly, at her age there are things that could go wrong with surgery or she could end up with a stiff knee and have less flexibility.      I believe that if she starts to show less confidence in her knee, and/or if she should begin to have falls, then we could re-evaluate the situation.  She also reports that if she started to have a fear of falling, she probably would just use a walker or a  crutch or a cane, none of which are a daily habit for her.      I do not think it would be of value to repeat x-rays at this point in time.      She will return to see if she has increased pain or increased fear of falling.  At that time we could repeat standing alignment films and PA views.  We do not need to have a lateral or an axial view repeated.      Room time 15 minutes, consultation time 12.       Answers for HPI/ROS submitted by the patient on 3/28/2017   General Symptoms: No  Skin Symptoms: No  HENT Symptoms: Yes  EYE SYMPTOMS: Yes  HEART SYMPTOMS: No  LUNG SYMPTOMS: No  INTESTINAL SYMPTOMS: Yes  URINARY SYMPTOMS: No  GYNECOLOGIC SYMPTOMS: No  BREAST SYMPTOMS: No  SKELETAL SYMPTOMS: No  BLOOD SYMPTOMS: No  NERVOUS SYSTEM SYMPTOMS: No  MENTAL HEALTH SYMPTOMS: No  Ear pain: No  Ear discharge: No  Hearing loss: Yes  Tinnitus: No  Nosebleeds: No  Congestion: Yes  Sinus pain: No  Trouble swallowing: No   Voice hoarseness: No  Mouth sores: No  Sore throat: No  Tooth pain: No  Gum tenderness: No  Bleeding gums: No  Change in taste: No  Change in sense of smell: No  Dry mouth: No  Hearing aid used: Yes  Neck lump: No  Eye pain: No  Vision loss: No  Dry eyes: No  Watery eyes: No  Eye bulging: No  Double vision: No  Flashing of lights: No  Spots: No  Floaters: Yes  Redness: No  Crossed eyes: No  Tunnel Vision: No  Yellowing of  Requested for: 43Xon6260; Last   Rx:17Bog3861 Ordered   4  Eliquis 2 5 MG Oral Tablet; Take 1 tablet twice daily; Therapy: 61PJG1256 to (Giovanni Ji)  Requested for: 34UTN2191; Last   Rx:82Vjr8496 Ordered   5  Furosemide 40 MG Oral Tablet; take 1 tablet by mouth once daily; Therapy: 70FEH9243 to (Eloise Khalil)  Requested for: 07GKN0322; Last   Rx:08Jun2016 Ordered   6  Iron 325 (65 Fe) MG Oral Tablet; Take 1 tablet twice daily Recorded   7  Metoprolol Tartrate 25 MG Oral Tablet; TAKE 1 TABLET TWICE DAILY; Last   Rx:83Zib3558 Ordered   8  Multivitamins Oral Capsule Recorded   9  Potassium Chloride ER 10 MEQ Oral Capsule Extended Release; TAKE 1 CAPSULE   TWICE DAILY  Requested for: 38YDZ5233; Last Rx:72Zvb4827 Ordered    Allergies    1  Amoxicillin CAPS   2  Penicillins    Plan  Anemia in CKD (chronic kidney disease)    · (1) CBC/ PLT (NO DIFF); Status:Active;  Requested for:45Olg9189;     Signatures   Electronically signed by : DAVIDE Larsen ; Nov 28 2016  2:09PM EST                       (Author) eyes: No  Eye irritation: No  Heart burn or indigestion: No  Nausea: No  Vomiting: No  Abdominal pain: No  Bloating: No  Constipation: No  Diarrhea: Yes  Blood in stool: No  Black stools: No  Rectal or Anal pain: No  Fecal incontinence: No  Rectal bleeding: No  Yellowing of skin or eyes: No  Vomit with blood: No  Change in stools: Yes      Again, thank you for allowing me to participate in the care of your patient.      Sincerely,    Hailey Gray MD

## 2018-01-13 NOTE — RESULT NOTES
Verified Results  (1) BASIC METABOLIC PROFILE [id number] 11:20AM Dominic Todd     Test Name Result Flag Reference   GLUCOSE,RANDM 134 mg/dL     If the patient is fasting, the ADA then defines impaired fasting glucose as > 100 mg/dL and diabetes as > or equal to 123 mg/dL  SODIUM 145 mmol/L  136-145   POTASSIUM 2 9 mmol/L L 3 5-5 3   CHLORIDE 101 mmol/L  100-108   CARBON DIOXIDE 34 mmol/L H 21-32   ANION GAP (CALC) 10 mmol/L  4-13   BLOOD UREA NITROGEN 17 mg/dL  5-25   CREATININE 1 51 mg/dL H 0 60-1 30   Standardized to IDMS reference method   CALCIUM 9 3 mg/dL  8 3-10 1   eGFR Non-African American 32 5 ml/min/1 73sq [facility] [employer] Disease Education Program recommendations are as follows:  GFR calculation is accurate only with a steady state creatinine  Chronic Kidney disease less than 60 ml/min/1 73 sq  meters  Kidney failure less than 15 ml/min/1 73 sq  meters

## 2018-01-14 VITALS
SYSTOLIC BLOOD PRESSURE: 118 MMHG | DIASTOLIC BLOOD PRESSURE: 70 MMHG | HEART RATE: 84 BPM | WEIGHT: 172 LBS | RESPIRATION RATE: 16 BRPM | TEMPERATURE: 97.2 F | BODY MASS INDEX: 31.65 KG/M2 | HEIGHT: 62 IN

## 2018-01-14 VITALS
TEMPERATURE: 97.9 F | DIASTOLIC BLOOD PRESSURE: 66 MMHG | RESPIRATION RATE: 16 BRPM | BODY MASS INDEX: 29.63 KG/M2 | SYSTOLIC BLOOD PRESSURE: 130 MMHG | WEIGHT: 161 LBS | HEART RATE: 77 BPM | HEIGHT: 62 IN

## 2018-01-14 VITALS
HEIGHT: 62 IN | TEMPERATURE: 99.9 F | WEIGHT: 172.25 LBS | BODY MASS INDEX: 31.7 KG/M2 | DIASTOLIC BLOOD PRESSURE: 78 MMHG | HEART RATE: 76 BPM | SYSTOLIC BLOOD PRESSURE: 122 MMHG

## 2018-01-14 VITALS
HEART RATE: 72 BPM | WEIGHT: 164.31 LBS | BODY MASS INDEX: 30.24 KG/M2 | HEIGHT: 62 IN | SYSTOLIC BLOOD PRESSURE: 138 MMHG | DIASTOLIC BLOOD PRESSURE: 70 MMHG

## 2018-01-14 VITALS
HEIGHT: 62 IN | HEART RATE: 80 BPM | BODY MASS INDEX: 29.49 KG/M2 | DIASTOLIC BLOOD PRESSURE: 72 MMHG | SYSTOLIC BLOOD PRESSURE: 124 MMHG | WEIGHT: 160.25 LBS

## 2018-01-14 VITALS
SYSTOLIC BLOOD PRESSURE: 132 MMHG | HEART RATE: 72 BPM | BODY MASS INDEX: 29.44 KG/M2 | WEIGHT: 160 LBS | HEIGHT: 62 IN | DIASTOLIC BLOOD PRESSURE: 80 MMHG

## 2018-01-14 VITALS — DIASTOLIC BLOOD PRESSURE: 60 MMHG | HEART RATE: 60 BPM | HEIGHT: 62 IN | SYSTOLIC BLOOD PRESSURE: 128 MMHG

## 2018-01-15 VITALS
BODY MASS INDEX: 31.83 KG/M2 | WEIGHT: 173 LBS | HEIGHT: 62 IN | SYSTOLIC BLOOD PRESSURE: 136 MMHG | HEART RATE: 68 BPM | DIASTOLIC BLOOD PRESSURE: 66 MMHG

## 2018-01-15 NOTE — MISCELLANEOUS
Assessment    1  Anemia (285 9) (D64 9)   2  Osteoarthritis of both hands (715 94) (M19 041,M19 042)   3  Bowel obstruction (560 9) (K56 60)    Plan  Anemia    · (1) HEMOGLOBIN, BLOOD; Status:Active; Requested for:21Vci8528;    Perform:[facility]; NMD:03KFI4821; Ordered; Chandrika Watts; Ordered By:Jacquie Cantu;  Osteoarthritis of both hands    · Diclofenac Sodium 1 % Transdermal Gel; Apply 4 grams to affected area of  lowerextremities FOUR times daily  Do not applymore than 16grams daily to any  oneaffected joint   Rx By: Jenna Collazo; Dispense: 0 Days ; #:1 X 100 GM Tube; Refill: 3; For: Osteoarthritis of both hands; CIERA = N; Verified Transmission to [facility] ; Last Updated By: [facility]; 2/24/2017 9:09:24 AM    Discussion/Summary  Discussion Summary:   After the patient's on the iron for a month we'll repeat a hemoglobin and make further decisions whether to continue or not based on where her hemoglobin ranges  In addition, she experiences any vomiting going for just go right to the emergency room  Also, the patient cannot take an NSAID because she's on a blood thinner so I have prescribed topical instead and we'll see how she does with this for her osteoarthritis symptoms  She has continued pain and limited motion in the left index finger she is to call  Medication SE Review and Pt Understands Tx: Possible side effects of new medications were reviewed with the patient/guardian today  The treatment plan was reviewed with the patient/guardian  The patient/guardian understands and agrees with the treatment plan      History of Present Illness  TCM Communication [facility]: The patient is being contacted for follow-up after hospitalization and 2/24/17 845am  She was hospitalized at [facility]  The date of admission: 2/4/17, date of discharge: 2/13/17  Diagnosis: hernia  She was discharged to home  Medications reviewed and updated today     She scheduled a follow up appointment  Follow-up appointments with other specialists: briseida surgeon, ira perla  Symptoms: weakness and fatigue, but no fever, no dizziness, no headache, no cough, no shortness of breath, no chest pain, no back pain on left side, no back pain on right side, no arm pain left side, no arm pain on right side, no leg pain on left side, no leg pain on right side, no upper abdominal pain, no middle abdominal pain, no lower abdominal pain, no rash:, no anorexia, no nausea, no vomiting, no loose stools, no constipation, no pain with urinating, no incisional pain, no wound drainage and no swelling  The patient is currently experiencing symptoms  Counseling was provided to patient's family  spoke with daughter  Communication performed and completed by   HPI: Patient presents status post hospitalization for transitional care visit after having a bowel obstruction/ileus  The general surgeon told her since this was the second time this has happened that if it happens again she will need to have a bowel resection  We had a long talk and the only symptoms she's had both times vomiting so she was instructed that in the future if she has any vomiting whatsoever she is to head right to the emergency room so on obstruction series can be obtained  At the time of discharge she was anemic with a hemoglobin of 11 so we reporting are on iron for the next month until it comes up to the 12-14 range  She is eating but doesn't have as much of an appetite is prior to the hospitalization  She denies any abdominal pain nausea vomiting or change in bowel habits  She complains of pain in her left index finger and painful nodules distally with some impaired range of motion secondary to pain  Review of Systems  Complete-Female:   Constitutional: No fever, no chills, feels well, no tiredness, no recent weight gain or weight loss     Eyes: No complaints of eye pain, no red eyes, no eyesight problems, no discharge, no dry eyes, no itching of eyes  ENT: no complaints of earache, no loss of hearing, no nose bleeds, no nasal discharge, no sore throat, no hoarseness  Cardiovascular: No complaints of slow heart rate, no fast heart rate, no chest pain, no palpitations, no leg claudication, no lower extremity edema  Respiratory: No complaints of shortness of breath, no wheezing, no cough, no SOB on exertion, no orthopnea, no PND  Gastrointestinal: No complaints of abdominal pain, no constipation, no nausea or vomiting, no diarrhea, no bloody stools  Genitourinary: No complaints of dysuria, no incontinence, no pelvic pain, no dysmenorrhea, no vaginal discharge or bleeding  Musculoskeletal: arthralgias, joint stiffness and Pain in the left index finger at the nodules distally located on the left index finger with some pain with range of motion, but No complaints of arthralgias, no myalgias, no joint swelling or stiffness, no limb pain or swelling  Integumentary: No complaints of skin rash or lesions, no itching, no skin wounds, no breast pain or lump  Neurological: No complaints of headache, no confusion, no convulsions, no numbness, no dizziness or fainting, no tingling, no limb weakness, no difficulty walking  Psychiatric: Not suicidal, no sleep disturbance, no anxiety or depression, no change in personality, no emotional problems  Endocrine: No complaints of proptosis, no hot flashes, no muscle weakness, no deepening of the voice, no feelings of weakness  Hematologic/Lymphatic: No complaints of swollen glands, no swollen glands in the neck, does not bleed easily, does not bruise easily  Preventive Quality 65 Older:   Preventive Quality 65 and Older: Falls Risk: The patient fell 0 times in the past 12 months  The patient currently has no urinary incontinence symptoms  Active Problems    1  Abdominal pain (789 00) (R10 9)   2  Achilles tendinitis (726 71) (M76 60)   3  Acid reflux (530 81) (K21 9)   4   Acute diastolic congestive heart failure (428 31,428 0) (I50 31)   5  Acute URI (465 9) (J06 9)   6  Allergic rhinitis due to pollen (477 0) (J30 1)   7  Anemia (285 9) (D64 9)   8  Anemia in CKD (chronic kidney disease) (285 21) (N18 9,D63 1)   9  Anxiety (300 00) (F41 9)   10  Atrial fibrillation (427 31) (I48 91)   11  Valverde's esophagus (530 85) (K22 70)   12  Benign hypertension with chronic kidney disease, 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,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,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 4)   13  Bursitis, hip, right (726 5) (M70 71)   14  Cardiac arrhythmia (427 9) (I49 9)   15  Cardiomyopathy (425 4) (I42 9)   16  Cellulitis (682 9) (L03 90)   17  Cervical pain (723 1) (M54 2)   18  Change in mental status (780 97) (R41 82)   19  CHF (congestive heart failure) (428 0) (I50 9)   20  CKD (chronic kidney disease), stage III (585 3) (N18 3)   21  Dehydration (276 51) (E86 0)   22  Diastolic dysfunction (486 8) (I51 9)   23  Dyspnea (786 09) (R06 00)   24  Edema (782 3) (R60 9)   25  Elevated pancreatic enzyme (790 5) (R74 8)   26  Esophageal candidiasis (112 84) (B37 81)   27  Esophageal ulcer (530 20) (K22 10)   28  Fatigue (780 79) (R53 83)   29  Flu vaccine need (V04 81) (Z23)   30  Headache (784 0) (R51)   31  Hearing loss due to cerumen impaction, right (389 8,380 4) (H61 21)   32  Hiatal hernia (553 3) (K44 9)   33  Hypercholesteremia (272 0) (E78 00)   34  Hyperglycemia (790 29) (R73 9)   35  Hypertension (401 9) (I10)   36  Hypokalemia (276 8) (E87 6)   37  Impacted cerumen, unspecified laterality (380 4) (H61 20)   38  Inquiry And Counseling: Hearing Impairment (V65 49)   39  Insomnia (780 52) (G47 00)   40  Iron deficiency anemia (280 9) (D50 9)   41  Laceration of left leg (894 0) (S81 812A)   42  Loss of height (781 91) (R29 890)   43  Medicare annual wellness visit, initial (V70 0) (Z00 00)   44  Near syncope (780 2) (R55)   45  Screening for genitourinary condition (V81 6) (Z13 89)   46  Screening for neurological condition (V80 09) (Z13 89)   47   Secondary hyperparathyroidism (588 81) (N25 81)   48  SSS (sick sinus syndrome) (427 81) (I49 5)   49  Viral gastritis (535 50) (K29 70)   50  Viral gastroenteritis (008 8) (A08 4)   51  Vitamin D deficiency (268 9) (E55 9)   52  Weakness (780 79) (R53 1)   53  Weight loss (783 21) (R63 4)    Surgical History    1  Inquiry And Counseling: Hearing Impairment (V65 49)   2  History of Pacemaker Placement  Surgical History Reviewed: The surgical history was reviewed and updated today  Family History  Mother    1  Denied: Family history of Colon cancer   2  Denied: Family history of Crohn's disease without complication, unspecified   gastrointestinal tract location   3  Denied: Family history of liver disease  Father    4  Family history of Arteriosclerotic coronary artery disease   5  Denied: Family history of Colon cancer   6  Denied: Family history of Crohn's disease without complication, unspecified   gastrointestinal tract location   7  Denied: Family history of liver disease  Daughter    6  Family history of kidney stone (V18 69) (Z84 1)  Family History    9  Family history of thyroid disease (V18 19) (Z83 49)  Family History Reviewed: The family history was reviewed and updated today  Social History    · Always uses seat belt   · Daily Coffee Consumption (___ Cups/Day)   · Daily Cola Consumption (___ Cans/Day)   · Daily Tea Consumption (___ Cups/Day)   · Dental care, regularly   · Education Level: Less than high school   · Denied: History of Exercise frequency (times/week)   · Denied: History of Guns in the Home: Not stored in locked cabinet   · Has 3 children   · Marital History -    · Never A Smoker   · Patient has living will (V49 89) (Z78 9)   · Pets / animals   · Power of  in existence   · Retired   · Denied: History of Sexually active   · Social alcohol use (Z78 9)  Social History Reviewed: The social history was reviewed and updated today   The social history was reviewed and is unchanged  Current Meds   1  AmLODIPine Besylate 5 MG Oral Tablet; take 1 tablet by mouth daily; Therapy: 46QBE3508 to (Tru Rojo)  Requested for: 34YUR9967; Last   Rx:28Nov2016 Ordered   2  Eliquis 2 5 MG Oral Tablet; Take 1 tablet twice daily; Therapy: 91UWC1279 to (Anupam Failing)  Requested for: 96JPL1601; Last   Rx:44Hle1647 Ordered   3  Furosemide 40 MG Oral Tablet; take 1 tablet by mouth 3 times a week as needed; Therapy: 23KGW9141 to (Evaluate:12Apr2017)  Requested for: 44ECZ4632 Recorded   4  Iron 325 (65 Fe) MG Oral Tablet; Take 1 tablet twice daily Recorded   5  Metoprolol Tartrate 25 MG Oral Tablet; take 1 tablet by mouth twice a day; Therapy: 37CUU0860 to (Haroldo Patient)  Requested for: 56OWN2895; Last   Rx:28Nov2016 Ordered   6  Multivitamins Oral Capsule Recorded   7  Pantoprazole Sodium 40 MG Oral Tablet Delayed Release; Take 1 tablet daily; Therapy: 49Wrx6472 to (Evaluate:21Apr2017)  Requested for: 07Ndj9513; Last   Rx:54Ogb4953 Ordered   8  Potassium Chloride ER 10 MEQ Oral Capsule Extended Release; take one capsule 3   times a week when taking lasix; Therapy: (Recorded:88Cgm4727) to  Requested for: 16Baj2995 Recorded  Medication List Reviewed: The medication list was reviewed and updated today  Allergies    1  Penicillins   2  Amoxicillin CAPS    Vitals  Signs   Recorded: 61Rvl9416 08:48AM   Temperature: 97 2 F  Heart Rate: 84  Respiration: 16  Systolic: 382  Diastolic: 70  Height: 5 ft 2 in  Weight: 172 lb   BMI Calculated: 31 46  BSA Calculated: 1 79    Physical Exam    Constitutional   General appearance: No acute distress, well appearing and well nourished  Eyes   Conjunctiva and lids: No swelling, erythema or discharge  Pupils and irises: Equal, round, reactive to light  Ophthalmoscopic examination: Normal fundi and optic discs      Ears, Nose, Mouth, and Throat   External inspection of ears and nose: Normal     Otoscopic examination: Abnormal   Right TM is occluded with cerumen and was removed with a cerumen loop until the TM was fully visualized and intact  Hearing: Normal     Nasal mucosa, septum, and turbinates: Normal without edema or erythema  Lips, teeth, and gums: Normal, good dentition  Oropharynx: Normal with no erythema, edema, exudate or lesions  Neck   Neck: Supple, symmetric, trachea midline, no masses  Thyroid: Normal, no thyromegaly  Pulmonary   Respiratory effort: No increased work of breathing or signs of respiratory distress  Percussion of chest: Normal     Palpation of chest: Normal     Auscultation of lungs: Clear to auscultation  Cardiovascular   Palpation of heart: Normal PMI, no thrills  Auscultation of heart: Normal rate and rhythm, normal S1 and S2, no murmurs  Carotid pulses: 2+ bilaterally  Abdominal aorta: Normal     Femoral pulses: 2+ bilaterally  Pedal pulses: 2+ bilaterally  Examination of extremities for edema and/or varicosities: Normal     Chest   Breasts: Normal, no dimpling or skin changes appreciated  Palpation of breasts and axillae: Normal, no masses palpated  Abdomen   Abdomen: Non-tender, no masses  Liver and spleen: No hepatomegaly or splenomegaly  Examination for hernias: No hernia appreciated  Anus, perineum, and rectum: Normal sphincter tone, no masses, no prolapse  Stool sample for occult blood: Negative  Genitourinary   External genitalia and vagina: Normal, no lesions appreciated  Urethra: Normal, no discharge  Bladder: Not distended, no tenderness  Cervix: Normal, no lesions  Uterus: Normal size, no tenderness, no masses  Adnexa/Parametria: Normal, no masses or tenderness  Lymphatic   Palpation of lymph nodes in neck: No lymphadenopathy  Palpation of lymph nodes in axillae: No lymphadenopathy  Palpation of lymph nodes in groin: No lymphadenopathy  Palpation of lymph nodes in other areas: No lymphadenopathy  Musculoskeletal   Digits and nails: Abnormal   Distal nodules and left index finger which are tender and warm on palpation  Range of motion: Normal     Stability: Normal     Muscle strength/tone: Normal     Skin   Skin and subcutaneous tissue: Normal without rashes or lesions  Palpation of skin and subcutaneous tissue: Normal turgor  Neurologic   Reflexes: 2+ and symmetric  Psychiatric   Judgment and insight: Normal     Orientation to person, place, and time: Normal     Recent and remote memory: Intact  Mood and affect: Normal        Future Appointments    Date/Time Provider Specialty Site   04/26/2017 10:00 AM Rosario Turcios DO Family Medicine [facility]   03/09/2017 01:30 PM Cardiology, [address]   06/09/2017 01:00 PM Cardiology, Device Remote   [facility]   04/04/2017 03:50 PM Sherita Bumpers, M D   Nephrology  [address] [facility]     Signatures   Electronically signed by : Erica Covington DO; Feb 24 2017  9:49AM EST                       (Author)

## 2018-01-15 NOTE — RESULT NOTES
Message    Esophageal brushings came back as positive for Candida  Patient to be started on Diflucan     1    Minardo to call patient      ADDENDUM: I had already called patient and spoke to daughter about findings on 4/25; sent Rx for Diflucan at that time  Patient's daughter at this time reports the patient tolerated this regimen well, and doing well with regards to acid reflux and indigestion  I also changed Dexilant to omeprazole as the daughter mentioned the patient complaining of lots of joint pains since starting this, and I advised her to bring this up at her PCP appointment upcoming, as this is not commonly an acute adverse effect of Dexilant  1       1 Amended By: Benedetto Halsted; Jun 08 2016 1:56 PM EST    Verified Results  (1) FUNGUS - YEAST CULTURE 03Yyt4419 11:04AM Jade Hones[place]   In saline     Test Name Result Flag Reference   CLINICAL REPORT (Report)     Test:        Fungal culture  Specimen Source:  Brushing, esophageal  Specimen Type:   Tissue  Specimen Date:   4/11/2016 11:04 AM  Result Date:    4/15/2016 7:57 AM  Result Status:   Final result  Resulting Lab:   [place] [address]            Tel: [phone]                 CULTURE                                       ------------------                                   2+ Growth of Candida albicans     *** This organism has been edited   The previous result was Yeast on 4/13/2016      at 1320 EDT  ***       Signatures   Electronically signed by : Benedetto Halsted, [facility]; Jun 8 2016  1:59PM EST                       (Author)

## 2018-01-16 NOTE — MISCELLANEOUS
Message  GI Reminder Recall [facility]:   Date: 04/13/2016   Dear Mary Dominguez:     Review of our records shows you are due for the following: EGD  Please call the following office to schedule your appointment:   [address] [place] [address], [address], [address] [place] [address]  [phone]  We look forward to hearing from you! Sincerely,         Signatures   Electronically signed by :  Mariam Mark, ; Apr 13 2016  2:02PM EST                       (Author)

## 2018-01-16 NOTE — MISCELLANEOUS
History of Present Illness  TCM Communication [facility]: The patient is being contacted for follow-up after hospitalization and NO HAIM APPOINT SCHEDULED ,DID NOT REACH PT WITHIN THE 2 DAYS IT IS POSSIBLE PT IS IN REHAB  She was hospitalized at [facility] [address]  The dates of hospitalization:, date of admission: 3-31-17, date of discharge: 4-5-17  Diagnosis: LARGE SLIDING /PARAESOPHAGEAL HIATAL HERNIA  Medications were not reviewed today  She did not schedule a follow up appointment  Communication performed and completed by DAVIDE HANSEN LPN      Active Problems    1  Abdominal pain (789 00) (R10 9)   2  Achilles tendinitis (726 71) (M76 60)   3  Acid reflux (530 81) (K21 9)   4  Acute diastolic congestive heart failure (428 31,428 0) (I50 31)   5  Acute URI (465 9) (J06 9)   6  Allergic rhinitis due to pollen (477 0) (J30 1)   7  Anemia (285 9) (D64 9)   8  Anemia in CKD (chronic kidney disease) (285 21) (N18 9,D63 1)   9  Anxiety (300 00) (F41 9)   10  Atrial fibrillation (427 31) (I48 91)   11  Valverde's esophagus (530 85) (K22 70)   12  Benign hypertension with chronic kidney disease, 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,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,N18 4)   13  Bowel obstruction (560 9) (K56 60)   14  Bursitis, hip, right (726 5) (M70 71)   15  Cardiac arrhythmia (427 9) (I49 9)   16  Cardiomyopathy (425 4) (I42 9)   17  Cellulitis (682 9) (L03 90)   18  Cervical pain (723 1) (M54 2)   19  Change in mental status (780 97) (R41 82)   20  CHF (congestive heart failure) (428 0) (I50 9)   21  CKD (chronic kidney disease), stage III (585 3) (N18 3)   22  Dehydration (276 51) (E86 0)   23  Diastolic dysfunction (861 8) (I51 9)   24  Dyspnea (786 09) (R06 00)   25  Edema (782 3) (R60 9)   26  Elevated pancreatic enzyme (790 5) (R74 8)   27  Esophageal candidiasis (112 84) (B37 81)   28  Esophageal ulcer (530 20) (K22 10)   29  Fatigue (780 79) (R53 83)   30  Flu vaccine need (V04 81) (Z23)   31  Gastric volvulus (539 89) (K31 89)   32   Headache (784  0) (R51)   33  Hearing loss due to cerumen impaction, right (389 8,380 4) (H61 21)   34  Hiatal hernia (553 3) (K44 9)   35  Hypercholesteremia (272 0) (E78 00)   36  Hyperglycemia (790 29) (R73 9)   37  Hypertension (401 9) (I10)   38  Hypokalemia (276 8) (E87 6)   39  Impacted cerumen, unspecified laterality (380 4) (H61 20)   40  Inquiry And Counseling: Hearing Impairment (V65 49)   41  Insomnia (780 52) (G47 00)   42  Iron deficiency anemia (280 9) (D50 9)   43  Laceration of left leg (894 0) (S81 812A)   44  Loss of height (781 91) (R29 890)   45  Medicare annual wellness visit, initial (V70 0) (Z00 00)   46  Near syncope (780 2) (R55)   47  Osteoarthritis of both hands (715 94) (M19 041,M19 042)   48  Overactive bladder (596 51) (N32 81)   49  Screening for genitourinary condition (V81 6) (Z13 89)   50  Screening for neurological condition (V80 09) (Z13 89)   51  Secondary hyperparathyroidism (588 81) (N25 81)   52  SSS (sick sinus syndrome) (427 81) (I49 5)   53  Viral gastritis (535 50) (K29 70)   54  Viral gastroenteritis (008 8) (A08 4)   55  Vitamin D deficiency (268 9) (E55 9)   56  Weakness (780 79) (R53 1)   57  Weight loss (783 21) (R63 4)    Surgical History    1  Inquiry And Counseling: Hearing Impairment (V65 49)   2  History of Pacemaker Placement    Family History  Mother    1  Denied: Family history of Colon cancer   2  Denied: Family history of Crohn's disease without complication, unspecified   gastrointestinal tract location   3  Denied: Family history of liver disease  Father    4  Family history of Arteriosclerotic coronary artery disease   5  Denied: Family history of Colon cancer   6  Denied: Family history of Crohn's disease without complication, unspecified   gastrointestinal tract location   7  Denied: Family history of liver disease  Daughter    6  Family history of kidney stone (V18 69) (Z84 1)  Family History    9   Family history of thyroid disease (V18 19) (Z83 49)    Social History    · Always uses seat belt   · Daily Coffee Consumption (___ Cups/Day)   · Daily Cola Consumption (___ Cans/Day)   · Daily Tea Consumption (___ Cups/Day)   · Dental care, regularly   · Education Level: Less than high school   · Denied: History of Exercise frequency (times/week)   · Denied: History of Guns in the Home: Not stored in locked cabinet   · Has 3 children   · Marital History -    · Never A Smoker   · Patient has living will (V49 89) (Z78 9)   · Pets / animals   · Power of  in existence   · Retired   · Denied: History of Sexually active   · Social alcohol use (Z78 9)    Current Meds   1  AmLODIPine Besylate 5 MG Oral Tablet; take 1 tablet by mouth daily; Therapy: 89EHC0735 to (Yanna Shaffer)  Requested for: 30BHH7147; Last   Rx:28Nov2016 Ordered   2  Diclofenac Sodium 1 % Transdermal Gel; Apply 4 grams to affected area of   lowerextremities FOUR times daily  Do not applymore than 16grams daily to any   oneaffected joint; Therapy: 03EUF8991 to (Last Rx:85Qik3612)  Requested for: 24Xnx6827 Ordered   3  Furosemide 40 MG Oral Tablet; take 1 tablet by mouth 3 times a week as needed; Therapy: 55WTA2686 to (Evaluate:12Apr2017)  Requested for: 38CVX1304 Recorded   4  Iron 325 (65 Fe) MG Oral Tablet; Take 1 tablet twice daily Recorded   5  Metoprolol Tartrate 25 MG Oral Tablet; take 1 tablet by mouth twice a day; Therapy: 58CIU9984 to (Berlin Alfaro)  Requested for: 68WAE3115; Last   Rx:28Nov2016 Ordered   6  Multivitamins Oral Capsule Recorded   7  Pantoprazole Sodium 40 MG Oral Tablet Delayed Release; Take 1 tablet daily; Therapy: 51Hip7304 to (Evaluate:21Apr2017)  Requested for: 03Ldi0731; Last   Rx:05Caj8784 Ordered   8  Potassium Chloride ER 10 MEQ Oral Capsule Extended Release; take one capsule 3   times a week when taking lasix; Therapy: (Recorded:79Muz3007) to  Requested for: 61Yqy2837 Recorded   9  Toviaz 8 MG Oral Tablet Extended Release 24 Hour;  Take 1 tablet daily; Therapy: 97ODL2411 to (Evaluate:04Jun2017)  Requested for: 36UKT7216; Last   Rx:06Mar2017 Ordered   10  Xarelto 15 MG Oral Tablet; Take 1 tablet daily; Therapy: 79SZY8664 to (Evaluate:26Jun2017); Last Rx:06Mar2017 Ordered    Allergies    1  Penicillins   2  Amoxicillin CAPS    Future Appointments    Date/Time Provider Specialty Site   04/26/2017 10:00 AM Gerald Friedman DO Family Medicine [facility]   03/12/2018 01:30 PM Cardiology, [address]   06/09/2017 01:00 PM Cardiology, Device Remote  [facility] CARDIOLOGY DEVICE CLINIC     Signatures   Electronically signed by : Franklin Otto DO;  Apr 11 2017  8:20AM EST                       (Author)

## 2018-01-17 NOTE — MISCELLANEOUS
Message  GI Reminder Recall [facility]:   Date: 01/21/2017   Dear Lydia Donald:     Review of our records shows you are due for the following: EGD  Please call the following office to schedule your appointment:   [address] [facility], [address], [place], [address] [place] [address]  [phone]  We look forward to hearing from you!      Sincerely,       Dr Carreno Argue   Electronically signed by : Dennie Bart, ; Jan 21 2017 10:31AM EST                       (Author)

## 2018-01-18 NOTE — MISCELLANEOUS
Message  GI Reminder Recall [facility]:   Date: 11/24/2017   Dear Devi Awad:     Review of our records shows you are due for the following: EGD  Or records indicate that you are due at this time to have a follow-up examination for a EGD  As you know, these tests are done to prevent cancer, a very common disease in the [place] and responsible for thousands of patient deaths each year  We at [facility] are concerned for your health, and would very much appreciate you getting in touch with us at your earliest convenience  Again, this examination is vital to your proper health maintenance and for the prevention of cancer  Please call the following office to schedule your appointment:   [address], [address] [place] [address] [phone]  We look forward to hearing from you!      Sincerely,     [facility]      Signatures   Electronically signed by : Pooja Jefferson, ; Nov 24 2017  3:32PM EST                       (Author)

## 2018-01-22 VITALS
HEART RATE: 72 BPM | SYSTOLIC BLOOD PRESSURE: 126 MMHG | HEIGHT: 62 IN | WEIGHT: 169 LBS | DIASTOLIC BLOOD PRESSURE: 72 MMHG | BODY MASS INDEX: 31.1 KG/M2

## 2018-01-23 NOTE — PROGRESS NOTES
Assessment   1  Bursitis, hip, right (726 5) (M70 71)  2  Flu vaccine need (V04 81) (Z23)  3  CHF (congestive heart failure) (428 0) (I50 9)    Plan  Bursitis, hip, right    · Start: PredniSONE 5 MG Oral Tablet; TAKE 3 TABLET Twice daily for 2 days,t then 2 bid for  two days, 1 bid for 2 days,then 1 daily for 2 days and stop   · Administer: Ketorolac Tromethamine 60 MG/2ML Injection Solution; 2ml IM now; To Be  Done: [id number]   · Administer: MethylPREDNISolone Acetate 80 MG/ML Injection Suspension  (Depo-Medrol); 80 mg given; To Be Done: [id number]  Flu vaccine need    · Administer: Prevnar 13 Intramuscular Suspension; INJECT 0 5  ML Intramuscular; To Be  Done: [id number]    Discussion/Summary    The patient is to continue weighing herself daily and call for his weight gain of greater than 2 pounds overnight or she develops dyspnea with lying flat  In addition, if her hip is not feeling better within 4-5 days time, she is to call  She is to apply a cold pack twice a day for 15-20 minute intervals to the right hip area C  the patient back in 6 weeks, she is upon sooner  Possible side effects of new medications were reviewed with the patient/guardian today  The treatment plan was reviewed with the patient/guardian  The patient/guardian understands and agrees with the treatment plan      Chief Complaint  2-3 WEEK CHECK      History of Present Illness  The patient presents for followup for congestive heart failure and is doing much better on the diuretic therapy  She denies any shortness of breath, orthopnea, chest pressure at this time  There's a trace amount of edema but no or near where she started from  In addition, she had an incident in the hospital where she fell and she's now having some right hip pain  The pain is with going up and down stairs and with weightbearing but other than that she doesn't have discomfort  There's no radicular symptoms there is no paresthesia or weakness of the legs noted        Review of Systems    Constitutional: No fever, no chills, feels well, no tiredness, no recent weight gain or weight loss  Eyes: No complaints of eye pain, no red eyes, no eyesight problems, no discharge, no dry eyes, no itching of eyes  ENT: no complaints of earache, no loss of hearing, no nose bleeds, no nasal discharge, no sore throat, no hoarseness  Cardiovascular: lower extremity edema, but No complaints of slow heart rate, no fast heart rate, no chest pain, no palpitations, no leg claudication, no lower extremity edema  Respiratory: No complaints of shortness of breath, no wheezing, no cough, no SOB on exertion, no orthopnea, no PND  Gastrointestinal: No complaints of abdominal pain, no constipation, no nausea or vomiting, no diarrhea, no bloody stools  Genitourinary: No complaints of dysuria, no incontinence, no pelvic pain, no dysmenorrhea, no vaginal discharge or bleeding  Musculoskeletal: arthralgias and right hip pain with weightbearing and flexion/extension, but No complaints of arthralgias, no myalgias, no joint swelling or stiffness, no limb pain or swelling  Integumentary: No complaints of skin rash or lesions, no itching, no skin wounds, no breast pain or lump  Neurological: No complaints of headache, no confusion, no convulsions, no numbness, no dizziness or fainting, no tingling, no limb weakness, no difficulty walking  Psychiatric: Not suicidal, no sleep disturbance, no anxiety or depression, no change in personality, no emotional problems  Endocrine: No complaints of proptosis, no hot flashes, no muscle weakness, no deepening of the voice, no feelings of weakness  Hematologic/Lymphatic: No complaints of swollen glands, no swollen glands in the neck, does not bleed easily, does not bruise easily  Preventive Quality 65 and Older:1  Falls Risk:1  The patient fell 1 times in the past 12 months  1   The most recent fall occurred  8 week(s) ago1  1  indoors1  and  [address] [place] [address]   The fall resulted from  loss of balance1  1   The fall was preceded by  a period of dizziness1  1   The fall resulted in  RT HIP PAIN1  1   Associated symptoms: 1  pain from the injury1    HIP PAIN1   The patient is currently experiencing urinary symptoms  1  Urinary Incontinence Symptoms includes:  urinary incontinence1  1        1 Amended By: Amarjit Hernandez; Jan 20 2016 1:34 PM EST    Active Problems   1  Achilles tendinitis (726 71) (M76 60)  2  Acute diastolic congestive heart failure (428 31,428 0) (I50 31)  3  Acute renal insufficiency (593 9) (N28 9)  4  Acute URI (465 9) (J06 9)  5  Allergic rhinitis due to pollen (477 0) (J30 1)  6  Anemia (285 9) (D64 9)  7  Anxiety (300 00) (F41 9)  8  Atrial fibrillation (427 31) (I48 91)  9  Valverde's esophagus (530 85) (K22 70)  10  Benign hypertension with chronic kidney disease, 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,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,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)  11  Cardiac arrhythmia (427 9) (I49 9)  12  Cardiomyopathy (425 4) (I42 9)  13  Cellulitis (682 9) (L03 90)  14  Change in mental status (780 97) (R41 82)  15  Diastolic dysfunction (840 2) (I51 9)  16  Dyspnea (786 09) (R06 00)  17  Edema (782 3) (R60 9)  18  Esophageal ulcer (530 20) (K22 10)  19  Fatigue (780 79) (R53 83)  20  Hiatal hernia (553 3) (K44 9)  21  Hypercholesteremia (272 0) (E78 0)  22  Hypertension (401 9) (I10)  23  Hypokalemia (276 8) (E87 6)  24  Impacted cerumen, unspecified laterality (380 4) (H61 20)  25  Inquiry And Counseling: Hearing Impairment (V65 49)  26  Insomnia (780 52) (G47 00)  27  Near syncope (780 2) (R55)  28  Screening for genitourinary condition (V81 6) (Z13 89)  29  Screening for neurological condition (V80 09) (Z13 89)  30  Secondary hyperparathyroidism (588 81) (N25 81)  31  SSS (sick sinus syndrome) (427 81) (I49 5)  32  Vitamin D deficiency (268 9) (E55 9)  33  Weakness (780 79) (R53 1)    Past Medical History    The active problems and past medical history were reviewed and updated today        Surgical History   1  Inquiry And Counseling: Hearing Impairment (V65 49)    The surgical history was reviewed and updated today  Family History   1  Family history of Arteriosclerotic coronary artery disease   2  Family history of kidney stone (V18 69) (Z84 1)    The family history was reviewed and updated today  Social History    · Denied: History of Alcohol Use (History)   · Daily Coffee Consumption (___ Cups/Day)   · Daily Cola Consumption (___ Cans/Day)   · Daily Tea Consumption (___ Cups/Day)   · Marital History -    · Never A Smoker  The social history was reviewed and updated today  The social history was reviewed and is unchanged  Current Meds  1  AmLODIPine Besylate 5 MG Oral Tablet; TAKE 1 TABLET DAILY; Last Rx:[id number]   Ordered  2  Eliquis 2 5 MG Oral Tablet; Take 1 tablet twice daily; Therapy: 38ZPX2917 to (Usama Bolanos)  Requested for: 48JFO1706; Last   Rx:[id number] Ordered  3  Furosemide 40 MG Oral Tablet; Take 1 tablet daily; Therapy: 37PTN9093 to (Evaluate:25Jun2016)  Requested for: 0676 543 19 15; Last   Rx:[id number] Ordered  4  Metoprolol Tartrate 25 MG Oral Tablet; TAKE 1 TABLET TWICE DAILY; Last Rx:[id number]   Ordered  5  Omeprazole 40 MG Oral Capsule Delayed Release; TAKE 1 CAPSULE DAILY; Therapy: 26CXK7692 to (Evaluate:13Apr2016)  Requested for: 59KKB8315; Last   Rx:[id number] Ordered  6  Potassium Chloride ER 10 MEQ Oral Capsule Extended Release; TAKE 1 CAPSULE   TWICE DAILY  Requested for: 46CUT3448; Last RK:[id number] Ordered  7  Sucralfate 1 GM Oral Tablet; TAKE 1 TABLET 4 TIMES DAILY; Last Rx:[id number] Ordered  8  Vitamin D (Ergocalciferol) 52700 UNIT Oral Capsule; TAKE 1 CAPSULE WEEKLY; Therapy: 53OGM5169 to (Velvet Maza)  Requested for: 52DFO9707; Last   Rx:[id number] Ordered    The medication list was reviewed and updated today  Allergies   1   Amoxicillin CAPS    Vitals  Vital Signs [Data Includes: Current Encounter]    Recorded: 56SNE4887 12:44PM Temperature 97 8 F   Heart Rate 78   Respiration 20   Systolic 014   Diastolic 78   Height 5 ft 2 in   Weight 181 lb    BMI Calculated 33 11   BSA Calculated 1 83     Physical Exam    Constitutional   General appearance: No acute distress, well appearing and well nourished  Eyes   Conjunctiva and lids: No swelling, erythema or discharge  Pupils and irises: Equal, round, reactive to light  Ophthalmoscopic examination: Normal fundi and optic discs  Ears, Nose, Mouth, and Throat   External inspection of ears and nose: Normal     Otoscopic examination: Tympanic membranes translucent with normal light reflex  Canals patent without erythema  Hearing: Normal     Nasal mucosa, septum, and turbinates: Normal without edema or erythema  Lips, teeth, and gums: Normal, good dentition  Oropharynx: Normal with no erythema, edema, exudate or lesions  Neck   Neck: Supple, symmetric, trachea midline, no masses  Thyroid: Normal, no thyromegaly  Pulmonary   Respiratory effort: No increased work of breathing or signs of respiratory distress  Percussion of chest: Normal     Palpation of chest: Normal     Auscultation of lungs: Clear to auscultation  Cardiovascular   Palpation of heart: Normal PMI, no thrills  Auscultation of heart: Normal rate and rhythm, normal S1 and S2, no murmurs  Carotid pulses: 2+ bilaterally  Abdominal aorta: Normal     Femoral pulses: 2+ bilaterally  Pedal pulses: 2+ bilaterally  Examination of extremities for edema and/or varicosities: Abnormal   + trace edema the lower extremities bilaterally  Chest   Breasts: Normal, no dimpling or skin changes appreciated  Palpation of breasts and axillae: Normal, no masses palpated  Abdomen   Abdomen: Non-tender, no masses  Liver and spleen: No hepatomegaly or splenomegaly  Examination for hernias: No hernia appreciated  Anus, perineum, and rectum: Normal sphincter tone, no masses, no prolapse     Stool sample for occult blood: Negative  Genitourinary   External genitalia and vagina: Normal, no lesions appreciated  Urethra: Normal, no discharge  Bladder: Not distended, no tenderness  Cervix: Normal, no lesions  Uterus: Normal size, no tenderness, no masses  Adnexa/Parametria: Normal, no masses or tenderness  Lymphatic   Palpation of lymph nodes in neck: No lymphadenopathy  Palpation of lymph nodes in axillae: No lymphadenopathy  Palpation of lymph nodes in groin: No lymphadenopathy  Palpation of lymph nodes in other areas: No lymphadenopathy  Musculoskeletal   Gait and station: Normal     Digits and nails: Normal without clubbing or cyanosis  Joints, bones, and muscles: Abnormal   positive tenderness on palpation externally of the right hip positive Karishma Peat sign on the right with negative straight leg raising on the right  Range of motion: Normal     Stability: Normal     Muscle strength/tone: Normal     Skin   Skin and subcutaneous tissue: Normal without rashes or lesions  Palpation of skin and subcutaneous tissue: Normal turgor  Neurologic   Cranial nerves: Cranial nerves II-XII intact  Reflexes: 2+ and symmetric  Sensation: No sensory loss  Psychiatric   Judgment and insight: Normal     Orientation to person, place, and time: Normal     Recent and remote memory: Intact  Mood and affect: Normal        Health Management  Valverde's esophagus   EGD; every 3 months; Next Due: 58WRP9384; Overdue    Future Appointments    Date/Time Provider Specialty Site   02/09/2016 01:30 PM Tc Angeles DO Family Medicine [facility] FAMILY MEDICINE [place]   03/03/2016 01:30 PM Cardiology, [address]   06/08/2016 01:00 PM Cardiology, Device Remote   D[address]   01/20/2016 04:00 PM DAVIDE Ramey   Gastroenterology Adult [facility] GASTROENTEROLOGY [facility]   03/10/2016 10:10 AM DAVIDE Chavez  Nephrology  [address] [facility]     Signatures   Electronically signed by : Rachel Mccormick DO; Jan 20 2016  1:10PM EST                       (Author)    Electronically signed by : Rachel Mccormick DO; Jan 20 2016  1:46PM EST                       (Author)

## 2018-01-23 NOTE — PROGRESS NOTES
Assessment   1  Loss of height (781 91) (R29 890)  2  Medicare annual wellness visit, initial (V70 0) (Z00 00)  3  Atrial fibrillation (427 31) (I48 91)  4  Anemia (285 9) (D64 9)  5  Hypertension (401 9) (I10)    Plan  Loss of height, Medicare annual wellness visit, initial    · * DXA BONE DENSITY SPINE HIP AND PELVIS; Status:Active; Requested  for:02Mar2016;   Medicare annual wellness visit, initial    · Medicare Annual Wellness Visit; Status:Complete;   Done: 41ASW7400 12:00AM    Discussion/Summary    I'll see the patient back in 4 months for regular checkup and review the results of the pending studies that were ordered today  She will be following up with both GI and cardiology in the next few weeks  1        1 Amended By: Dayana Pearl; Mar 03 2016 12:26 PM EST    Chief Complaint  MEDICARE WELLNESS PE      History of Present Illness  HPI: The patient presents for her Medicare wellness exam this is her initial evaluation  She's up-to-date with blood work but declining a Pap and mammogram or pelvic examination  She is willing to go for bone density because she has had a documented loss of height  I explained to her that the importance of going for colonoscopy but she is declining because of her age and she states that she understands the risk of potentially having a cancerous polyp  She is up-to-date with her vaccinations at the present time  She denies any depression or cognitive impairment  She denies any safety issues at home falls or urinary incontinence issues  She can perform her activities of daily living independently and has both an advanced directive and living well  She denies any depression she denies any drug or alcohol use as well  She is also here for follow-up for anemia, A  fib and hypertension in all are stable at this time  1    Welcome to [employer] and Wellness Visits: The patient is being seen for the initial annual wellness visit     Medicare Screening and Risk Factors   Hospitalizations: she has been previously hospitalizied and she has been hospitalized 1 times  Medicare Screening Tests Risk Questions   Osteoporosis risk assessment: , female gender and over 48years of age  HIV risk assessment: none indicated  Drug and Alcohol Use: The patient has never smoked cigarettes  The patient reports never drinking alcohol  Alcohol concern:   The patient has no concerns about alcohol abuse  She has never used illicit drugs  Diet and Physical Activity: Current diet includes low salt food choices, 1-2 servings of fruit per day, 1 servings of vegetables per day, 1 servings of meat per day, 1 servings of dairy products per day, 1 cups of coffee per day and 5 CUPS WATER/DAY  The patient does not exercise  Mood Disorder and Cognitive Impairment Screening: She denies feeling down, depressed, or hopeless over the past two weeks  She reports feeling little interest or pleasure in doing things over the past two weeks  Cognitive impairment screening: denies difficulty learning/retaining new information, difficulty handling complex tasks, difficulty with reasoning, denies difficulty with spatial ability and orientation, denies difficulty with language and denies difficulty with behavior  Functional Ability/Level of Safety: Hearing is slightly decreased and a hearing aid is not used  She reports hearing difficulties  The patient is currently able to do activities of daily living without limitations, able to do instrumental activities of daily living without limitations, able to participate in social activities without limitations and able to drive without limitations  Activities of daily living details: transportation help needed, meal preparation help needed and needs help managing medications, but does not need help using the phone, does not need help shopping, does not need help doing housework, does not need help doing laundry and does not need help managing money   Fall risk factors: antihypertensive use, up and go test was unsteady or greater than thirty seconds and previous fall, but no polypharmacy, no alcohol use, no mobility impairment, no antidepressant use, no deconditioning, no postural hypotension, no sedative use, no visual impairment, no urinary incontinence and no cognitive impairment  Home safety risk factors:  loose rugs, but no unfamiliar surroundings, no poor household lighting, no uneven floors, no household clutter, grab bars in the bathroom and handrails on the stairs  Advance Directives: Advance directives: living will, durable power of  for health care directives and advance directives  Co-Managers and Medical Equipment/Suppliers: See Patient Care Team       1 Amended By: Mariel Joseph; Mar 03 2016 12:24 PM EST    Patient Care Team    Care Team Member Role Specialty Office Number   Farhad Cecilia PATE  Cardiology [phone]   [address]  Cardiology [phone]   [place], [address]  Cardiology [phone]   Cardiology, Gregg PATE  Gastroenterology Adult [phone]   Jeri Brambila MD  Ophthalmology [phone]     Review of Systems    Constitutional:1  negative1   Eyes:1  negative1   ENT:1  negative1   Cardiovascular:1  negative1   Respiratory:1  negative1   Gastrointestinal:1  negative1   Genitourinary:1  negative1   Musculoskeletal:1  negative1   Integumentary and Breasts:1  negative1   Neurological:1  negative1   Psychiatric:1  negative1   Endocrine:1  negative1   Hematologic and Lymphatic:1  negative1   Over the past 2 weeks, how often have you been bothered by the following problems? 1 ) Little interest or pleasure in doing things? Not at all    2 ) Feeling down, depressed or hopeless? Not at all    3 ) Trouble falling asleep or sleeping too much? Not at all    4 ) Feeling tired or having little energy? Not at all    5 ) Poor appetite or overeating? Not at all  6  ) Feeling bad about yourself, or that you are a failure, or have let yourself or your family down? Not at all    7 ) Trouble concentrating on things, such as reading a newspaper or watching television? Not at all    8 ) Moving or speaking so slowly that other people could have noticed, or the opposite, moving or speaking faster than usual? Not at all  How difficult have these problems made it for you to do your work, take care of things at home, or get along with people? 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)  11  Bursitis, hip, right (726 5) (M70 71)  12  Cardiac arrhythmia (427 9) (I49 9)  13  Cardiomyopathy (425 4) (I42 9)  14  Cellulitis (682 9) (L03 90)  15  Change in mental status (780 97) (R41 82)  16  CHF (congestive heart failure) (428 0) (I50 9)  17  Diastolic dysfunction (836 3) (I51 9)  18  Dyspnea (786 09) (R06 00)  19  Edema (782 3) (R60 9)  20  Esophageal ulcer (530 20) (K22 10)  21  Fatigue (780 79) (R53 83)  22  Flu vaccine need (V04 81) (Z23)  23  GERD (gastroesophageal reflux disease) (530 81) (K21 9)  24  Hiatal hernia (553 3) (K44 9)  25  Hypercholesteremia (272 0) (E78 0)  26  Hypertension (401 9) (I10)  27  Hypokalemia (276 8) (E87 6)  28  Impacted cerumen, unspecified laterality (380 4) (H61 20)  29  Inquiry And Counseling: Hearing Impairment (V65 49)  30  Insomnia (780 52) (G47 00)  31  Near syncope (780 2) (R55)  32   Screening for genitourinary condition (V81 6) (Z13 89)  33  Screening for neurological condition (V80 09) (Z13 89)  34  Secondary hyperparathyroidism (588 81) (N25 81)  35  SSS (sick sinus syndrome) (427 81) (I49 5)  36  Vitamin D deficiency (268 9) (E55 9)  37  Weakness (780 79) (R53 1)    Past Medical History    The active problems and past medical history were reviewed and updated today  1        1 Amended By: Alyssa Funes; Mar 03 2016 12:25 PM EST    Surgical History    · Inquiry And Counseling: Hearing Impairment (V65 49)   · History of Pacemaker Placement    The surgical history was reviewed and updated today  1        1 Amended By: Alyssa Funes; Mar 03 2016 12:25 PM EST    Family History    · Denied: Family history of Colon cancer   · Denied: Family history of Crohn's disease without complication, unspecified  gastrointestinal tract location   · Denied: Family history of liver disease    · Family history of Arteriosclerotic coronary artery disease   · Denied: Family history of Colon cancer   · Denied: Family history of Crohn's disease without complication, unspecified  gastrointestinal tract location   · Denied: Family history of liver disease    · Family history of kidney stone (V18 69) (Z84 1)    · Family history of thyroid disease (V18 19) (Z83 49)    The family history was reviewed and updated today  1        1 Amended By: Alyssa Funes; Mar 03 2016 12:25 PM EST    Social History    · Denied: History of Alcohol Use (History)   · Daily Coffee Consumption (___ Cups/Day)   · Daily Cola Consumption (___ Cans/Day)   · Daily Tea Consumption (___ Cups/Day)   · Marital History -    · Never A Smoker  The social history was reviewed and updated today  1  The social history was reviewed and is unchanged  1        1 Amended By: Alyssa Funes; Mar 03 2016 12:25 PM EST    Current Meds  1  AmLODIPine Besylate 5 MG Oral Tablet; TAKE 1 TABLET DAILY; Last Rx:56Ogm9781   Ordered  2  Eliquis 2 5 MG Oral Tablet;  Take 1 tablet twice daily; Therapy: 96FKK2737 to (Kath Harley)  Requested for: 56PJX4323; Last   Rx:02Ytv3600 Ordered  3  Furosemide 40 MG Oral Tablet; Take 1 tablet daily; Therapy: 04DWQ7776 to (Evaluate:25Jun2016)  Requested for: 0676 543 19 15; Last   Rx:08Ugn3847 Ordered  4  Iron 325 (65 Fe) MG Oral Tablet; Take 1 tablet daily as directed Recorded  5  Metoprolol Tartrate 25 MG Oral Tablet; TAKE 1 TABLET TWICE DAILY; Last   Rx:57Oav7152 Ordered  6  Multivitamins Oral Capsule Recorded  7  Omeprazole 40 MG Oral Capsule Delayed Release; TAKE 1 CAPSULE DAILY; Therapy: 69DIM8576 to (Evaluate:13Apr2016)  Requested for: 62FTZ9427; Last   Rx:18Gby4834 Ordered  8  Potassium Chloride ER 10 MEQ Oral Capsule Extended Release; TAKE 1 CAPSULE   TWICE DAILY  Requested for: 65KUO8727; Last QH:05MTH6375 Ordered  9  Sucralfate 1 GM Oral Tablet; TAKE 1 TABLET 4 TIMES DAILY; Last Rx:28Xtc7510   Ordered  10  Vitamin D (Ergocalciferol) 40012 UNIT Oral Capsule; TAKE 1 CAPSULE WEEKLY; Therapy: 13WKK4714 to (Vicenta Hawk)  Requested for: 60MUF1987; Last    Rx:62Pkg1347 Ordered    The medication list was reviewed and updated today  1        1 Amended By: Laurent Cardona; Mar 03 2016 12:25 PM EST    Allergies   1  Amoxicillin CAPS  2  Penicillins    Immunizations   ** Printed in Appendix #1 below  Vitals  Signs [Data Includes: Current Encounter]    Temperature: 97 6 F  Heart Rate: 76  Systolic: 428  Diastolic: 70  Height: 5 ft 2 in  Weight: 180 lb   BMI Calculated: 32 92  BSA Calculated: 1 83    Physical Exam    Constitutional1    General appearance: No acute distress, well appearing and well nourished  1    Eyes1    Conjunctiva and lids: No swelling, erythema or discharge  1    Pupils and irises: Equal, round, reactive to light  1    Ophthalmoscopic examination: Normal fundi and optic discs  1    Ears, Nose, Mouth, and Throat1    External inspection of ears and nose: Normal 1    Otoscopic examination: Tympanic membranes translucent with normal light reflex  Canals patent without erythema  1    Hearing: Normal 1    Nasal mucosa, septum, and turbinates: Normal without edema or erythema  1    Lips, teeth, and gums: Normal, good dentition  1    Oropharynx: Normal with no erythema, edema, exudate or lesions  1    Neck1    Neck: Supple, symmetric, trachea midline, no masses  1    Thyroid: Normal, no thyromegaly  1    Pulmonary1    Respiratory effort: No increased work of breathing or signs of respiratory distress  1    Percussion of chest: Normal 1    Palpation of chest: Normal 1    Auscultation of lungs: Clear to auscultation  1    Cardiovascular1    Palpation of heart: Normal PMI, no thrills  1    Auscultation of heart: Normal rate and rhythm, normal S1 and S2, no murmurs  1    Carotid pulses: 2+ bilaterally  1    Abdominal aorta: Normal 1    Femoral pulses: 2+ bilaterally  1    Pedal pulses: 2+ bilaterally  1    Examination of extremities for edema and/or varicosities: Abnormal  1  + trace edema the lower extremities bilaterally1   Chest1    Breasts: Normal, no dimpling or skin changes appreciated  1    Palpation of breasts and axillae: Normal, no masses palpated  1    Abdomen1    Abdomen: Non-tender, no masses  1    Liver and spleen: No hepatomegaly or splenomegaly  1    Examination for hernias: No hernia appreciated  1    Anus, perineum, and rectum: Normal sphincter tone, no masses, no prolapse  1    Stool sample for occult blood: Negative  1    Genitourinary1    External genitalia and vagina: Normal, no lesions appreciated  1    Urethra: Normal, no discharge  1    Bladder: Not distended, no tenderness  1    Cervix: Normal, no lesions  1    Uterus: Normal size, no tenderness, no masses  1    Adnexa/Parametria: Normal, no masses or tenderness  1    Lymphatic1    Palpation of lymph nodes in neck: No lymphadenopathy  1    Palpation of lymph nodes in axillae: No lymphadenopathy  1    Palpation of lymph nodes in groin: No lymphadenopathy  1    Palpation of lymph nodes in other areas: No lymphadenopathy  1    Musculoskeletal1    Digits and nails: Normal without clubbing or cyanosis  1    Range of motion: Normal 1    Stability: Normal 1    Muscle strength/tone: Normal 1    Skin1    Skin and subcutaneous tissue: Normal without rashes or lesions  1    Palpation of skin and subcutaneous tissue: Normal turgor  1    Neurologic1    Reflexes: 2+ and symmetric  1    Sensation: No sensory loss  1    Psychiatric1    Judgment and insight: Normal 1    Orientation to person, place, and time: Normal 1    Recent and remote memory: Intact  1    Mood and affect: Normal 1        1 Amended By: Henry Miller; Mar 03 2016 12:25 PM EST    Results/Data  Medicare Annual Wellness Visit 09VRW2328 12:00AM Henry Miller     Test Name Result Flag Reference   MEDICARE [place] VISIT 31LZJ2113         Procedure    Procedure: Visual Acuity Test    Indication: routine screening  Inforrmation supplied by GRACIELA BANDA a Snellen chart  Results: 20/50 in both eyes without corrective device   The patient tolerated the procedure well and was cooperative  There were no complications  Health Management  Valverde's esophagus   EGD; every 3 months; Last 27ABL4575; Next Due: 77ZWF1473; Overdue    Future Appointments    Date/Time Provider Specialty Site   2016 09:30 AM Henry Miller DO Family Medicine [facility]   2016 01:30 PM Cardiology, [address]   2016 01:00 PM Cardiology, Device Remote   [facility]   2016 08:45 AM DAVIDE Topete  Gastroenterology Adult [address]   03/10/2016 10:10 AM DAVIDE Chowdhury   [place]phrology  [address] [facility]     Signatures   Electronically signed by : Gary Coats DO; Mar  3 2016 12:26PM EST                       (Author)    Appendix #1     Patient: Brenden Arreola ; : 1928; MRN: [id number]      1 2 3 4 5    Influenza  79VMR4009 67CEP0566 92UML2527 49VGW9470 93HRA4361    Influenza A (H1N1) Monoval PF Intramuscular Suspension  96HBQ1538        Pneumococcal  67NZF4875 91WLS1685 62IWX1995

## 2018-03-07 NOTE — PROGRESS NOTES
"  Discussion/Summary  Normal device function      Results/Data  Cardiac Device Remote 72SNH3149 07:16PM Son Smallwood     Test Name Result Flag Reference   MISCELLANEOUS COMMENT      CARELINK TRANSMISSION: Z2VIVKUJTH VOLTAGE ADEQUATE  (10 YRS)  AP 9%  28%  ALL AVAILABLE LEAD PARAMETERS WITHIN NORMAL LIMITS  9 AHR EPISODES DETECTED (68% OF TIME)  PATIENT IS ON XARELTO AND METOPROLOL  NORMAL DEVICE FUNCTION  ---MONTERO   Cardiac Electrophysiology Report      [id number]  pdf   DEVICE TYPE Pacemaker       Cardiac Electrophysiology Report [id number] 07:16PM Son Smallwood     Test Name Result Flag Reference   Cardiac Electrophysiology Report      [id number] pdf     Signatures   Electronically signed by : Elyssa Alonzo, ; Dec 20 2017  1:58PM EST                       (Author)    Electronically signed by : Clarissa Srinivasan DO; Dec 21 2017 10:52AM EST                       (Author)    "

## 2018-03-07 NOTE — PROGRESS NOTES
"  Discussion/Summary  Normal device function     Known A fib  on anticoagulation    1 episode RVR  change to metoprolol succinate  Results/Data  Results   Cardiac Device In Clinic [id number] 07:27PM Giovanni Stratton     Test Name Result Flag Reference   MISCELLANEOUS COMMENT (Report)     DEVICE INTERROGATED IN THE [place] OFFICE: BATTERY VOLTAGE ADEQUATE   11% AP 3 6%  1 NEW DEVICE CLASSIFIED AHR NOTED, 49 HOURS  PT ON ELIQUIS  ALL AVAILABLE LEAD PARAMETERS WITHIN NORMAL LIMITS  DECREASE MADE TO RV AMPLITUDE TO PROMOTE DEVICE LONGEVITY WHILE MAINTAINING AN APPROPRIATE SAFETY MARGIN  NORMAL DEVICE FUNCTION  NC   Cardiac Electrophysiology Report      gigeawj[id number] Sheron_[id number]_Session Report_03_03_16_1  pdf   DEVICE TYPE Pacemaker       Cardiac Electrophysiology Report [id number] 07:27PM Giovanni Stratton     Test Name Result Flag Reference   Cardiac Electrophysiology Report      zz[id number]  pdf     Signatures   Electronically signed by : Tiarra Kinney, ; Mar  4 2016  9:06AM EST                       (Author)    Electronically signed by : DAVIDE Hawley ; Mar  7 2016  6:43AM EST                       (Author)    "

## 2018-03-07 NOTE — PROGRESS NOTES
"  Discussion/Summary  Normal device function      Results/Data  Cardiac Device Remote 18Sep2017 06:52PM Son Smallwood     Test Name Result Flag Reference   MISCELLANEOUS COMMENT      CARELINK TRANSMISSION: BATTERY VOLTAGE ADEQUATE (10 YRS)  AP-6%, -20%  ALL AVAILABLE LEAD PARAMETERS WITHIN NORMAL LIMITS  PT IN % OF TIME & ON XARELTO & METOPROLOL  NORMAL DEVICE FUNCTION  GV   Cardiac Electrophysiology Report      [id number] [id number]  pdf   DEVICE TYPE Pacemaker       Cardiac Electrophysiology Report 18Sep2017 06:52PM Son Smallwood     Test Name Result Flag Reference   Cardiac Electrophysiology Report      [id number]  pdf     Signatures   Electronically signed by : Gracia Norman RN; Sep 19 2017  3:32PM EST                       (Author)    Electronically signed by : Clarissa Srinivasan DO; Sep 26 2017  4:11PM EST                       (Author)    "

## 2018-03-07 NOTE — PROGRESS NOTES
"  Discussion/Summary  Normal device function     A fib  on anticoagulation  Results/Data  Results   Cardiac Device Remote [id number] 11:28AM Jessica First     Test Name Result Flag Reference   MISCELLANEOUS COMMENT      CARELINK TRANSMISSION: BATTERY VOLTAGE ADEQUATE (11 5 YRS)  AP-5%, -95%  PT IN % OF TIME & ON ELIQUIS  0 VHR EPISODES  ALL AVAILABLE LEAD PARAMETERS WITHIN NORMAL LIMITS  NORMAL DEVICE FUNCTION  GV   Cardiac Electrophysiology Report      gow[id number]}  pdf   DEVICE TYPE Pacemaker       Cardiac Electrophysiology Report [id number] 11:28AM Jessica First     Test Name Result Flag Reference   Cardiac Electrophysiology Report      higenpi[id number] pdf     Signatures   Electronically signed by : Trinidad Klein RN; Jun 8 2016 11:33AM EST                       (Author)    Electronically signed by : DAVIDE Hilton ; Jun 9 2016  3:12PM EST                       (Author)    "

## 2018-03-07 NOTE — PROGRESS NOTES
"  Discussion/Summary  Normal device function     A fib  on eliquis  Results/Data  Cardiac Device In Clinic [id number] 06:42PM Angelique Krueger     Test Name Result Flag Reference   MISCELLANEOUS COMMENT (Report)     DEVICE INTERROGATED IN THE [facility] OFFICE  BATTERY VOLTAGE ADEQUATE  (11 YRS)  AP 7%  23%  ALL LEAD PARAMETERS WITHIN NORMAL LIMITS  PATIENT CURRENTLY IN AFIB AND ON ELIQUIS  NO PROGRAMMING CHANGES MADE TO DEVICE PARAMETERS  NORMAL DEVICE FUNCTION  ---[facility]   Cardiac Electrophysiology Report      [id number]_[id number]_Session Report_03_09_17_1  pdf   DEVICE TYPE Pacemaker       Cardiac Electrophysiology Report [id number] 06:42PM Angelique Krueger     Test Name Result Flag Reference   Cardiac Electrophysiology Report      [id number]  pdf     Signatures   Electronically signed by : Arsh Goins, ; Mar  9 2017  1:56PM EST                       (Author)    Electronically signed by : DAVIDE Goel ; Mar 12 2017 11:02AM EST                       (Author)    "

## 2018-03-07 NOTE — PROGRESS NOTES
"  Discussion/Summary  Normal device function     A fib  on eliquis  Results/Data  Cardiac Device Remote 08Sep2016 04:32PM Darlen Fought     Test Name Result Flag Reference   MISCELLANEOUS COMMENT      CARELINK TRANSMISSION: Q0AAJQHSBO VOLTAGE ADEQUATE  (11 YRS)  AP %  14%  ALL AVAILABLE LEAD PARAMETERS WITHIN NORMAL LIMITS  3 AHR EPISODES DETECTED/ PATIENT CURRENTLY IN AFIB AND ON ELIQUIS  NORMAL DEVICE FUNCTION  ---MONTERO   Cardiac Electrophysiology Report      [id number]  pdf   DEVICE TYPE Pacemaker       Cardiac Electrophysiology Report 08Sep2016 04:32PM Daraditi Fought     Test Name Result Flag Reference   Cardiac Electrophysiology Report      [id number]  pdf     Signatures   Electronically signed by : Gareth Howard, ; Sep  8 2016 11:59AM EST                       (Author)    Electronically signed by : DAVIDE Patel ; Sep 17 2016 11:00PM EST                       (Author)    "

## 2018-03-07 NOTE — PROGRESS NOTES
"  Discussion/Summary  Normal device function      Results/Data  Cardiac Device Remote [id number] 01:06PM Nanci Latif     Test Name Result Flag Reference   MISCELLANEOUS COMMENT      CARELINK TRANSMISSION: BATTERY VOLTAGE ADEQUATE (10 5 YRS)  AP-6%, -18%  ALL AVAILABLE LEAD PARAMETERS WITHIN NORMAL LIMITS  PT IN % OF TIME & ON ELIQUIS & METOPROLOL  NORMAL DEVICE FUNCTION  GV   Cardiac Electrophysiology Report      [id number]  pdf   DEVICE TYPE Pacemaker       Cardiac Electrophysiology Report [id number] 01:06PM Nanci Latif     Test Name Result Flag Reference   Cardiac Electrophysiology Report      coued[id number]  pdf     Signatures   Electronically signed by : Liss Momin RN; Van 15 2017  3:14PM EST                       (Author)    Electronically signed by : Ahsan Peña DO; 2017 10:36AM EST                       (Author)    "

## 2018-03-12 ENCOUNTER — CLINICAL SUPPORT (OUTPATIENT)
Dept: CARDIOLOGY CLINIC | Facility: CLINIC | Age: 83
End: 2018-03-12
Payer: MEDICARE

## 2018-03-12 DIAGNOSIS — I48.20 CHRONIC ATRIAL FIBRILLATION (HCC): ICD-10-CM

## 2018-03-12 DIAGNOSIS — Z95.0 CARDIAC PACEMAKER IN SITU: Primary | ICD-10-CM

## 2018-03-12 PROCEDURE — 93280 PM DEVICE PROGR EVAL DUAL: CPT | Performed by: INTERNAL MEDICINE

## 2018-03-12 NOTE — PROGRESS NOTES
PACEMAKER  DEVICE INTERROGATED IN THE [facility] OFFICE: BATTERY VOLTAGE ADEQUATE  AP 11%  35%  ALL AVAILABLE LEAD PARAMETERS WITHIN NORMAL LIMITS  14 AHR NOTED, 52% MODE SWITCH  PT CURRENTLY IN AFIB  CAPT MANAGEMENT CHANGED TO MONITOR ONLY  NORMAL DEVICE FUNCTION   NC

## 2018-05-01 DIAGNOSIS — N18.9 CHRONIC KIDNEY DISEASE: ICD-10-CM

## 2018-05-01 DIAGNOSIS — N18.30 CHRONIC KIDNEY DISEASE, STAGE III (MODERATE) (HCC): ICD-10-CM

## 2018-06-18 ENCOUNTER — REMOTE DEVICE CLINIC VISIT (OUTPATIENT)
Dept: CARDIOLOGY CLINIC | Facility: CLINIC | Age: 83
End: 2018-06-18
Payer: MEDICARE

## 2018-06-18 DIAGNOSIS — Z95.0 CARDIAC PACEMAKER: ICD-10-CM

## 2018-06-18 DIAGNOSIS — I48.19 PERSISTENT ATRIAL FIBRILLATION (HCC): Primary | ICD-10-CM

## 2018-06-18 PROCEDURE — 93294 REM INTERROG EVL PM/LDLS PM: CPT

## 2018-06-18 PROCEDURE — 93296 REM INTERROG EVL PM/IDS: CPT

## 2018-06-19 NOTE — PROGRESS NOTES
MDT DUAL CHAMBER PPM  CARELINK TRANSMISSION: BATTERY VOLTAGE ADEQUATE (10 YRS)  AP-29%, -98% (>40%/MVP ON)  ALL AVAILABLE LEAD PARAMETERS APPEAR WITHIN NORMAL LIMITS & STABLE  PT PRESENTS IN PERSISTENT AF WITH BURDEN - 100%  PT ON ELIQUIS, METOPROLOL   NORMAL DEVICE FUNCTION   eb

## 2018-06-26 ENCOUNTER — OFFICE VISIT (OUTPATIENT)
Dept: NEPHROLOGY | Facility: CLINIC | Age: 83
End: 2018-06-26
Payer: MEDICARE

## 2018-06-26 VITALS
BODY MASS INDEX: 30.56 KG/M2 | HEIGHT: 64 IN | SYSTOLIC BLOOD PRESSURE: 150 MMHG | DIASTOLIC BLOOD PRESSURE: 86 MMHG | WEIGHT: 179 LBS

## 2018-06-26 DIAGNOSIS — R80.1 PERSISTENT PROTEINURIA: ICD-10-CM

## 2018-06-26 DIAGNOSIS — N25.81 SECONDARY HYPERPARATHYROIDISM (HCC): ICD-10-CM

## 2018-06-26 DIAGNOSIS — I12.9 BENIGN HYPERTENSION WITH CHRONIC KIDNEY DISEASE, STAGE III (HCC): ICD-10-CM

## 2018-06-26 DIAGNOSIS — N18.30 ANEMIA IN STAGE 3 CHRONIC KIDNEY DISEASE (HCC): ICD-10-CM

## 2018-06-26 DIAGNOSIS — D63.1 ANEMIA IN STAGE 3 CHRONIC KIDNEY DISEASE (HCC): ICD-10-CM

## 2018-06-26 DIAGNOSIS — N18.30 BENIGN HYPERTENSION WITH CHRONIC KIDNEY DISEASE, STAGE III (HCC): ICD-10-CM

## 2018-06-26 DIAGNOSIS — N18.30 CKD (CHRONIC KIDNEY DISEASE), STAGE III (HCC): Primary | ICD-10-CM

## 2018-06-26 PROBLEM — R80.9 PROTEINURIA: Status: ACTIVE | Noted: 2017-06-01

## 2018-06-26 PROBLEM — N17.9 ACUTE RENAL FAILURE SUPERIMPOSED ON STAGE 3 CHRONIC KIDNEY DISEASE (HCC): Status: RESOLVED | Noted: 2017-02-05 | Resolved: 2018-06-26

## 2018-06-26 PROBLEM — E87.6 HYPOKALEMIA: Status: RESOLVED | Noted: 2017-02-07 | Resolved: 2018-06-26

## 2018-06-26 PROBLEM — E87.0 HYPERNATREMIA: Status: RESOLVED | Noted: 2017-02-06 | Resolved: 2018-06-26

## 2018-06-26 PROBLEM — I10 ESSENTIAL HYPERTENSION: Chronic | Status: RESOLVED | Noted: 2017-02-05 | Resolved: 2018-06-26

## 2018-06-26 LAB
EXTERNAL BUN: 38
EXTERNAL CALCIUM: 8.4
EXTERNAL CHLORIDE: 107
EXTERNAL CO2: 27
EXTERNAL CREATININE: 1.53
EXTERNAL EGFR: 30
EXTERNAL POTASSIUM: 4.1
EXTERNAL SODIUM: 144
FERRITIN SERPL-MCNC: 100 NG/ML (ref 8–388)
IRON SERPL-MCNC: 46 UG/DL (ref 50–170)
PHOSPHATE SERPL-MCNC: 3.7 MG/DL (ref 2.3–4.1)
PROTEIN/CREAT RATIO (HISTORICAL): 0.17
PTH-INTACT SERPL-MCNC: 92.9 PG/ML

## 2018-06-26 PROCEDURE — 99214 OFFICE O/P EST MOD 30 MIN: CPT | Performed by: INTERNAL MEDICINE

## 2018-06-26 RX ORDER — OMEPRAZOLE 20 MG/1
20 CAPSULE, DELAYED RELEASE ORAL DAILY
COMMUNITY

## 2018-06-26 RX ORDER — LISINOPRIL 5 MG/1
5 TABLET ORAL DAILY
Qty: 30 TABLET | Refills: 0 | Status: SHIPPED | OUTPATIENT
Start: 2018-06-26 | End: 2018-10-17

## 2018-06-26 RX ORDER — FERROUS SULFATE 325(65) MG
325 TABLET ORAL 2 TIMES DAILY
COMMUNITY

## 2018-06-26 NOTE — LETTER
June 26, 2018     [address] [facility]    Patient: Lizeth Glaser   YOB: 1928   Date of Visit: 6/26/2018       Dear Dr Marianna Aguayo Recipients: Thank you for referring Erich Lemus to me for evaluation  Below are my notes for this consultation  If you have questions, please do not hesitate to call me  I look forward to following your patient along with you  Sincerely,        Lorrin Hodgkins, MD        CC: No Recipients  Lorrin Hodgkins, MD  6/26/2018 12:24 PM  Sign at close encounter  [address] [facility] M Luis Angel 80 y o  female MRN: [id number]  DATE: 6/26/2018  Reason for visit:   Chief Complaint   Patient presents with    Follow-up    Chronic Kidney Disease     ASSESSMENT and PLAN:  Chronic kidney disease stage III (baseline serum creatinine 1 5, previous baseline used to be 1 7-1 8 )  - CKD likely due to underlying hypertensive nephrosclerosis  - Last serum creatinine 1 5 in June 2018 overall stable  - patient had refused to consider dialysis in the future during my previous discussions    - Avoid nephrotoxins, NSAIDs or IV contrast  - We'll repeat BMP next month and before next visit  -starting lisinopril as below  - Renal ultrasound previously shows right atrophied kidney with 7 9 cm in size and left side kidney 12 1 cm  - ? Component of renal artery stenosis causing ischemic nephropathy (although she does not have any history of stroke, coronary artery disease, peripheral artery disease documented)  - Given stable renal function, we'll continue to monitor for now    Leg edema, overall stable  - Currently patient taking Lasix 40 mg by mouth daily along with potassium supplement  Continue same for now     - Advised to call back if has greater than 5 pound weight gain in 2 days    - Strict salt restricted diet     Hypertension  - Her blood pressure is slightly above goal in the office today     -given ongoing mild proteinuria, will start patient on lisinopril 5 mg p  o  daily and repeat BMP in two weeks  - Continue current antihypertensive regimen with amlodipine 5 mg by mouth daily, metoprolol 25 mg by mouth twice a day, Lasix 40 mg by mouth daily    - Goal systolic blood pressure 895Z    Secondary hyperparathyroidism  - PTH level 92 in June 2018 overall stable  CKD anemia/ iron deficiency anemia  - Last iron saturation 19%  Currently remains on p o  iron supplement  We'll repeat iron studies during next visit  Continue same for now  - Hemoglobin overall stable at 12 4  Proteinuria, improving, last UPC ratio 170 mg slightly worsened from previous value 140 mg   -will start lisinopril given stable renal function as above  - Continue to monitor  Likely secondary to long-term hypertension  -repeat UPC ratio before next visit  Diagnoses and all orders for this visit:    CKD (chronic kidney disease), stage III  -     Basic metabolic panel; Future  -     Phosphorus; Future  -     Protein / creatinine ratio, urine; Future  -     PTH, intact; Future  -     Basic metabolic panel; Future  -     lisinopril (ZESTRIL) 5 mg tablet; Take 1 tablet (5 mg total) by mouth daily    Benign hypertension with chronic kidney disease, stage III  -     lisinopril (ZESTRIL) 5 mg tablet; Take 1 tablet (5 mg total) by mouth daily    Anemia in stage 3 chronic kidney disease    Persistent proteinuria    Secondary hyperparathyroidism ([address] )    Other orders  -     ferrous sulfate 325 (65 Fe) mg tablet; Take 325 mg by mouth daily with breakfast  -     omeprazole (PriLOSEC) 20 mg delayed release capsule; Take 20 mg by mouth daily      SUBJECTIVE / HPI:  Patient is 42-year-old female with significant medical issues of CK D stage IV baseline creatinine 1 5, (previously 1 7-1 8), hypertension, A  fib, sick sinus syndrome, status post PPM, comes for regular follow-up of CK D  she is at [facility]  She has been feeling well   Denies any worsening dyspnea  She has stable right ankle edema  She has urinary incontinence issues  No new complaint  She denies any recent NSAID exposure  Her serum creatinine is overall stable  Her previous renal ultrasound showed right atrophied kidney  currently patient is taking Lasix 40 mg daily along with potassium supplement  She has a good appetite  REVIEW OF SYSTEMS:    Review of Systems   Constitutional: Negative for chills, fatigue and fever  HENT: Negative for congestion, ear pain and postnasal drip  Eyes: Negative for visual disturbance  Respiratory: Negative for cough and shortness of breath  Cardiovascular: Positive for leg swelling (right ankle trace)  Negative for chest pain  Gastrointestinal: Negative for abdominal pain, diarrhea, nausea and vomiting  Endocrine: Negative for polyuria  Genitourinary: Negative for decreased urine volume, difficulty urinating, dysuria, flank pain, frequency, hematuria and urgency  Musculoskeletal: Positive for arthralgias  Negative for back pain  Skin: Negative for rash  Neurological: Negative for dizziness, light-headedness and headaches  Hematological: Does not bruise/bleed easily  Psychiatric/Behavioral: Negative for behavioral problems and confusion  The patient is not nervous/anxious  More than 10 point review of systems were obtained and discussed in length with the patient  Complete review of systems were negative / unremarkable except mentioned above  PHYSICAL EXAM:  Vitals:    06/26/18 1133 06/26/18 1213   BP:  150/86   Weight: 81 2 kg (179 lb)    Height: 5' 4" (1 626 m)      Body mass index is 30 73 kg/m²  Physical Exam   Constitutional: She is oriented to person, place, and time  She appears well-developed and well-nourished  HENT:   Head: Normocephalic and atraumatic  Right Ear: External ear normal    Left Ear: External ear normal    Eyes: Conjunctivae and EOM are normal  Pupils are equal, round, and reactive to light  Neck: Neck supple  No JVD present  Cardiovascular: Normal rate and normal heart sounds  Pulmonary/Chest: Effort normal and breath sounds normal  She has no wheezes  She has no rales  Abdominal: Soft  Bowel sounds are normal  She exhibits no distension  There is no tenderness  Musculoskeletal: She exhibits edema (right ankle trace)  She exhibits no tenderness  Neurological: She is alert and oriented to person, place, and time  Skin: Skin is warm and dry  No rash noted  Psychiatric: She has a normal mood and affect  Her behavior is normal    Vitals reviewed  PAST MEDICAL HISTORY:  Past Medical History:   Diagnosis Date    A-fib [facility])     Achilles tendinitis     Anemia     Anxiety     Valverde's esophagus     Bursitis of hip     Cardiac arrhythmia     Di[facility]     Dyspnea     Esophageal ulcer     Fatigue     GERD (gastroesophageal reflux disease)     Hiatal hernia     Hypercholesteremia     Hyperparathyroidism ([address] )     Hypertension     Insomnia     Kidney disease, chronic, stage IV (GFR 15-29 ml/min) ([facility])     Pulmonary hypertension     SSS (sick sinus syndrome) ([facility])     Vitamin D deficiency        PAST SURGICAL HISTORY:  Past Surgical History:   Procedure Laterality Date    CARDIAC PACEMAKER PLACEMENT      ESOPHAGOGASTRODUODENOSCOPY N/A 2/10/2017    Procedure: ESOPHAGOGASTRODUODENOSCOPY (EGD); Surgeon: Ian Russell MD;  [facility]; Service:     PARAESOPHAGEAL HERNIA REPAIR N/A 4/2/2017    Procedure: REPAIR HERNIA PARAESOPHAGEAL  LAPAROSCOPIC HAND-ASSISTED WITH GASTROPEXY; Repair of umbilical incisional hernia no mesh;  Surgeon: Lakshmi Loaiza MD;  [facility];  Service:     CT ESOPHAGOGASTRODUODENOSCOPY TRANSORAL DIAGNOSTIC N/A 4/11/2016    Procedure: ESOPHAGOGASTRODUODENOSCOPY (EGD); Surgeon: Hayden Lopez MD;  [facility]: [facility];   Service: Gastroenterology       SOCIAL HISTORY:  History   Alcohol Use No     Comment: social     History Drug Use No     History   Smoking Status    Never Smoker   Smokeless Tobacco    Never Used       FAMILY HISTORY:  Family History   Problem Relation Age of Onset    No Known Problems Mother     No Known Problems Father     No Known Problems Sister     No Known Problems Brother        MEDICATIONS:    Current Outpatient Prescriptions:     amLODIPine (NORVASC) 5 mg tablet, Take 5 mg by mouth daily  , Disp: , Rfl:     ferrous sulfate 325 (65 Fe) mg tablet, Take 325 mg by mouth daily with breakfast, Disp: , Rfl:     furosemide (LASIX) 40 mg tablet, Take 1 tablet by mouth daily for 30 days, Disp: 30 tablet, Rfl: 0    Multiple Vitamin (MULTIVITAMIN) tablet, Take 1 tablet by mouth daily  , Disp: , Rfl:     omeprazole (PriLOSEC) 20 mg delayed release capsule, Take 20 mg by mouth daily, Disp: , Rfl:     potassium chloride (K-DUR,KLOR-CON) 20 mEq tablet, Take 1 tablet by mouth daily, Disp: 30 tablet, Rfl: 0    rivaroxaban (XARELTO) 10 mg tablet, Take 10 mg by mouth daily, Disp: , Rfl:     lisinopril (ZESTRIL) 5 mg tablet, Take 1 tablet (5 mg total) by mouth daily, Disp: 30 tablet, Rfl: 0    METOPROLOL TARTRATE PO, Take 25 mg by mouth 2 (two) times a day  , Disp: , Rfl:     pantoprazole (PROTONIX) 40 mg tablet, Take 1 tablet by mouth 2 (two) times a day before meals, Disp: 60 tablet, Rfl: 0    labs from 6/21/18 shows serum creatinine 1 5, serum sodium 144, UPC ratio 170 mg

## 2018-06-26 NOTE — PROGRESS NOTES
NEPHROLOGY OUTPATIENT PROGRESS NOTE   Manoj Plasencia 80 y o  female MRN: [id number]  DATE: 6/26/2018  Reason for visit:   Chief Complaint   Patient presents with    Follow-up    Chronic Kidney Disease     ASSESSMENT and PLAN:  Chronic kidney disease stage III (baseline serum creatinine 1 5, previous baseline used to be 1 7-1 8 )  - CKD likely due to underlying hypertensive nephrosclerosis  - Last serum creatinine 1 5 in June 2018 overall stable  - patient had refused to consider dialysis in the future during my previous discussions    - Avoid nephrotoxins, NSAIDs or IV contrast  - We'll repeat BMP next month and before next visit  -starting lisinopril as below  - Renal ultrasound previously shows right atrophied kidney with 7 9 cm in size and left side kidney 12 1 cm  - ? Component of renal artery stenosis causing ischemic nephropathy (although she does not have any history of stroke, coronary artery disease, peripheral artery disease documented)  - Given stable renal function, we'll continue to monitor for now    Leg edema, overall stable  - Currently patient taking Lasix 40 mg by mouth daily along with potassium supplement  Continue same for now  - Advised to call back if has greater than 5 pound weight gain in 2 days    - Strict salt restricted diet     Hypertension  - Her blood pressure is slightly above goal in the office today     -given ongoing mild proteinuria, will start patient on lisinopril 5 mg p  o  daily and repeat BMP in two weeks  - Continue current antihypertensive regimen with amlodipine 5 mg by mouth daily, metoprolol 25 mg by mouth twice a day, Lasix 40 mg by mouth daily    - Goal systolic blood pressure 401T    Secondary hyperparathyroidism  - PTH level 92 in June 2018 overall stable  CKD anemia/ iron deficiency anemia  - Last iron saturation 19%  Currently remains on p o  iron supplement  We'll repeat iron studies during next visit  Continue same for now     - Hemoglobin overall stable at 12 4  Proteinuria, improving, last UPC ratio 170 mg slightly worsened from previous value 140 mg   -will start lisinopril given stable renal function as above  - Continue to monitor  Likely secondary to long-term hypertension  -repeat UPC ratio before next visit  Diagnoses and all orders for this visit:    CKD (chronic kidney disease), stage III  -     Basic metabolic panel; Future  -     Phosphorus; Future  -     Protein / creatinine ratio, urine; Future  -     PTH, intact; Future  -     Basic metabolic panel; Future  -     lisinopril (ZESTRIL) 5 mg tablet; Take 1 tablet (5 mg total) by mouth daily    Benign hypertension with chronic kidney disease, stage III  -     lisinopril (ZESTRIL) 5 mg tablet; Take 1 tablet (5 mg total) by mouth daily    Anemia in stage 3 chronic kidney disease    Persistent proteinuria    Secondary hyperparathyroidism ([address] )    Other orders  -     ferrous sulfate 325 (65 Fe) mg tablet; Take 325 mg by mouth daily with breakfast  -     omeprazole (PriLOSEC) 20 mg delayed release capsule; Take 20 mg by mouth daily      SUBJECTIVE / HPI:  Patient is 68-year-old female with significant medical issues of CK D stage IV baseline creatinine 1 5, (previously 1 7-1 8), hypertension, A  fib, sick sinus syndrome, status post PPM, comes for regular follow-up of CK D  she is at [facility]  She has been feeling well  Denies any worsening dyspnea  She has stable right ankle edema  She has urinary incontinence issues  No new complaint  She denies any recent NSAID exposure  Her serum creatinine is overall stable  Her previous renal ultrasound showed right atrophied kidney  currently patient is taking Lasix 40 mg daily along with potassium supplement  She has a good appetite  REVIEW OF SYSTEMS:    Review of Systems   Constitutional: Negative for chills, fatigue and fever  HENT: Negative for congestion, ear pain and postnasal drip  Eyes: Negative for visual disturbance  Respiratory: Negative for cough and shortness of breath  Cardiovascular: Positive for leg swelling (right ankle trace)  Negative for chest pain  Gastrointestinal: Negative for abdominal pain, diarrhea, nausea and vomiting  Endocrine: Negative for polyuria  Genitourinary: Negative for decreased urine volume, difficulty urinating, dysuria, flank pain, frequency, hematuria and urgency  Musculoskeletal: Positive for arthralgias  Negative for back pain  Skin: Negative for rash  Neurological: Negative for dizziness, light-headedness and headaches  Hematological: Does not bruise/bleed easily  Psychiatric/Behavioral: Negative for behavioral problems and confusion  The patient is not nervous/anxious  More than 10 point review of systems were obtained and discussed in length with the patient  Complete review of systems were negative / unremarkable except mentioned above  PHYSICAL EXAM:  Vitals:    06/26/18 1133 06/26/18 1213   BP:  150/86   Weight: 81 2 kg (179 lb)    Height: 5' 4" (1 626 m)      Body mass index is 30 73 kg/m²  Physical Exam   Constitutional: She is oriented to person, place, and time  She appears well-developed and well-nourished  HENT:   Head: Normocephalic and atraumatic  Right Ear: External ear normal    Left Ear: External ear normal    Eyes: Conjunctivae and EOM are normal  Pupils are equal, round, and reactive to light  Neck: Neck supple  No JVD present  Cardiovascular: Normal rate and normal heart sounds  Pulmonary/Chest: Effort normal and breath sounds normal  She has no wheezes  She has no rales  Abdominal: Soft  Bowel sounds are normal  She exhibits no distension  There is no tenderness  Musculoskeletal: She exhibits edema (right ankle trace)  She exhibits no tenderness  Neurological: She is alert and oriented to person, place, and time  Skin: Skin is warm and dry  No rash noted  Psychiatric: She has a normal mood and affect   Her behavior is normal    Vitals reviewed  PAST MEDICAL HISTORY:  Past Medical History:   Diagnosis Date    A-fib [facility])     Achilles tendinitis     Anemia     Anxiety     Valverde's esophagus     Bursitis of hip     Cardiac arrhythmia     Diastolic CHF ([facility])     Dyspnea     Esophageal ulcer     Fatigue     GERD (gastroesophageal reflux disease)     Hiatal hernia     Hypercholesteremia     Hyperparathyroidism ([address] )     Hypertension     Insomnia     Kidney disease, chronic, stage IV (GFR 15-29 ml/min) ([facility])     Pulmonary hypertension     SSS (sick sinus syndrome) ([facility])     Vitamin D deficiency        PAST SURGICAL HISTORY:  Past Surgical History:   Procedure Laterality Date    CARDIAC PACEMAKER PLACEMENT      ESOPHAGOGASTRODUODENOSCOPY N/A 2/10/2017    Procedure: ESOPHAGOGASTRODUODENOSCOPY (EGD); Surgeon: Myrna Van MD;  [facility]; Service:     PARAESOPHAGEAL HERNIA REPAIR N/A 4/2/2017    Procedure: REPAIR HERNIA PARAESOPHAGEAL  LAPAROSCOPIC HAND-ASSISTED WITH GASTROPEXY; Repair of umbilical incisional hernia no mesh;  Surgeon: Robi Whitfield MD;  [facility];  Service:     MA ESOPHAGOGASTRODUODENOSCOPY TRANSORAL DIAGNOSTIC N/A 4/11/2016    Procedure: ESOPHAGOGASTRODUODENOSCOPY (EGD); Surgeon: Jin Perez MD;  [facility]; Service: Gastroenterology       SOCIAL HISTORY:  History   Alcohol Use No     Comment: social     History   Drug Use No     History   Smoking Status    Never Smoker   Smokeless Tobacco    Never Used       FAMILY HISTORY:  Family History   Problem Relation Age of Onset    No Known Problems Mother     No Known Problems Father     No Known Problems Sister     No Known Problems Brother        MEDICATIONS:    Current Outpatient Prescriptions:     amLODIPine (NORVASC) 5 mg tablet, Take 5 mg by mouth daily  , Disp: , Rfl:     ferrous sulfate 325 (65 Fe) mg tablet, Take 325 mg by mouth daily with breakfast, Disp: , Rfl:     furosemide (LASIX) 40 mg tablet, Take 1 tablet by mouth daily for 30 days, Disp: 30 tablet, Rfl: 0    Multiple Vitamin (MULTIVITAMIN) tablet, Take 1 tablet by mouth daily  , Disp: , Rfl:     omeprazole (PriLOSEC) 20 mg delayed release capsule, Take 20 mg by mouth daily, Disp: , Rfl:     potassium chloride (K-DUR,KLOR-CON) 20 mEq tablet, Take 1 tablet by mouth daily, Disp: 30 tablet, Rfl: 0    rivaroxaban (XARELTO) 10 mg tablet, Take 10 mg by mouth daily, Disp: , Rfl:     lisinopril (ZESTRIL) 5 mg tablet, Take 1 tablet (5 mg total) by mouth daily, Disp: 30 tablet, Rfl: 0    METOPROLOL TARTRATE PO, Take 25 mg by mouth 2 (two) times a day  , Disp: , Rfl:     pantoprazole (PROTONIX) 40 mg tablet, Take 1 tablet by mouth 2 (two) times a day before meals, Disp: 60 tablet, Rfl: 0    labs from 6/21/18 shows serum creatinine 1 5, serum sodium 144, UPC ratio 170 mg

## 2018-08-03 ENCOUNTER — OFFICE VISIT (OUTPATIENT)
Dept: CARDIOLOGY CLINIC | Facility: CLINIC | Age: 83
End: 2018-08-03
Payer: MEDICARE

## 2018-08-03 VITALS
WEIGHT: 178 LBS | BODY MASS INDEX: 30.39 KG/M2 | SYSTOLIC BLOOD PRESSURE: 134 MMHG | HEART RATE: 70 BPM | DIASTOLIC BLOOD PRESSURE: 70 MMHG | HEIGHT: 64 IN

## 2018-08-03 DIAGNOSIS — I48.20 CHRONIC ATRIAL FIBRILLATION (HCC): Primary | Chronic | ICD-10-CM

## 2018-08-03 DIAGNOSIS — I50.32 CHRONIC DIASTOLIC CHF (CONGESTIVE HEART FAILURE) (HCC): Chronic | ICD-10-CM

## 2018-08-03 DIAGNOSIS — Z95.0 PACEMAKER: ICD-10-CM

## 2018-08-03 DIAGNOSIS — I12.9 BENIGN HYPERTENSION WITH CHRONIC KIDNEY DISEASE, STAGE III (HCC): ICD-10-CM

## 2018-08-03 DIAGNOSIS — N18.30 BENIGN HYPERTENSION WITH CHRONIC KIDNEY DISEASE, STAGE III (HCC): ICD-10-CM

## 2018-08-03 PROCEDURE — 99213 OFFICE O/P EST LOW 20 MIN: CPT | Performed by: INTERNAL MEDICINE

## 2018-08-04 PROBLEM — Z95.0 PACEMAKER: Status: ACTIVE | Noted: 2018-08-04

## 2018-08-04 NOTE — PROGRESS NOTES
Cardiology Follow Up    David Stage Retter  1/31/1928  [id number]  [address]    1  Chronic [facility] fibrillation ([facility] )     2  Benign hypertension with chronic kidney disease, stage III     3  Chronic diastolic CHF (congestive heart failure) ([place] [address] )     4  Pacemaker           Discussion/Summary: All of her assessed cardiac problems are stable  I have reviewed her medications and made no changes  No cardiac testing is ordered   RTO 1 year  Interval History: She has not had any cardiac problems since her last OV  She denies CP, SOB, palpitations, dizziness  She remains in CAF and is 98% V paced  She is on Eliquis  BP is controlled  Patient Active Problem List   Diagnosis    Paraesophageal hernia with obstruction but no gangrene    [facility] atrial fibrillation ([facility])    Chronic diastolic CHF (congestive heart failure) ([facility])    Aspiration pneumonia ([facility])    Acute respiratory failure with hypoxia ([facility])    Benign hypertension with chronic kidney disease, stage III    Anemia in CKD (chronic kidney disease)    CKD (chronic kidney disease), stage III    Proteinuria    Secondary hyperparathyroidism ([place] [address] )    Pacemaker     Past Medical History:   Diagnosis Date    A-fib ([place] [address] )     Achilles tendinitis     Anemia     Anxiety     Valverde's esophagus     Bursitis of hip     Cardiac arrhythmia     Diastolic CHF ([place] [address] )     Dyspnea     Esophageal ulcer     Fatigue     GERD (gastroesophageal reflux disease)     Hiatal hernia     Hypercholesteremia     Hyperparathyroidism ([place] [address] )     Hypertension     Insomnia     Kidney disease, chronic, stage IV (GFR 15-29 ml/min) ([facility])     Pulmonary hypertension ([facility])     SSS (sick sinus syndrome) ([place] [address] )     Vitamin D deficiency      Social History     Social History    Marital status:       Spouse name: N/A    Number of children: N/A    Years of education: N/A     Occupational History    Not on file  Social History Main Topics    Smoking status: Never Smoker    Smokeless tobacco: Never Used    Alcohol use No      Comment: social    Drug use: No    Sexual activity: Not on file     Other Topics Concern    Not on file     Social History Narrative    No narrative on file      Family History   Problem Relation Age of Onset    No Known Problems Mother     No Known Problems Father     No Known Problems Sister     No Known Problems Brother      Past Surgical History:   Procedure Laterality Date    CARDIAC PACEMAKER PLACEMENT      ESOPHAGOGASTRODUODENOSCOPY N/A 2/10/2017    Procedure: ESOPHAGOGASTRODUODENOSCOPY (EGD); Surgeon: Nadine Campoverde MD;  [facility]; Service:     PARAESOPHAGEAL HERNIA REPAIR N/A 4/2/2017    Procedure: REPAIR HERNIA PARAESOPHAGEAL  LAPAROSCOPIC HAND-ASSISTED WITH GASTROPEXY; Repair of umbilical incisional hernia no mesh;  Surgeon: Noa Moseley MD;  [facility];  Service:     CA ESOPHAGOGASTRODUODENOSCOPY TRANSORAL DIAGNOSTIC N/A 4/11/2016    Procedure: ESOPHAGOGASTRODUODENOSCOPY (EGD); Surgeon: Baldomero Barrett MD;  [facility]; Service: Gastroenterology       Current Outpatient Prescriptions:     amLODIPine (NORVASC) 5 mg tablet, Take 5 mg by mouth daily  , Disp: , Rfl:     ferrous sulfate 325 (65 Fe) mg tablet, Take 325 mg by mouth daily with breakfast, Disp: , Rfl:     furosemide (LASIX) 40 mg tablet, Take 1 tablet by mouth daily for 30 days, Disp: 30 tablet, Rfl: 0    lisinopril (ZESTRIL) 5 mg tablet, Take 1 tablet (5 mg total) by mouth daily, Disp: 30 tablet, Rfl: 0    METOPROLOL TARTRATE PO, Take 25 mg by mouth 2 (two) times a day  , Disp: , Rfl:     Multiple Vitamin (MULTIVITAMIN) tablet, Take 1 tablet by mouth daily  , Disp: , Rfl:     omeprazole (PriLOSEC) 20 mg delayed release capsule, Take 20 mg by mouth daily, Disp: , Rfl:     pantoprazole (PROTONIX) 40 mg tablet, Take 1 tablet by mouth 2 (two) times a day before meals, Disp: 60 tablet, Rfl: 0    potassium chloride (K-DUR,KLOR-CON) 20 mEq tablet, Take 1 tablet by mouth daily, Disp: 30 tablet, Rfl: 0    rivaroxaban (XARELTO) 10 mg tablet, Take 10 mg by mouth daily, Disp: , Rfl:   Allergies   Allergen Reactions    Amoxicillin     Penicillins Hives     Vitals:    08/03/18 1542   BP: 134/70   BP Location: Left arm   Patient Position: Sitting   Cuff Size: Large   Pulse: 70   Weight: 80 7 kg (178 lb)   Height: 5' 4" (1 626 m)     Weight (last 2 days)     Date/Time   Weight    08/03/18 1542  80 7 (178)             Blood pressure 134/70, pulse 70, height 5' 4" (1 626 m), weight 80 7 kg (178 lb)  , Body mass index is 30 55 kg/m²  Labs:  Abstract on 06/26/2018   Component Date Value    EXTERNAL SODIUM 06/21/2018 144     EXTERNAL POTASSIUM 06/21/2018 4 1     EXTERNAL CHLORIDE 06/21/2018 107     EXTERNAL CO2 06/21/2018 27     EXTERNAL BUN 06/21/2018 38     EXTERNAL CREATININE 06/21/2018 1 53     EXTERNAL CALCIUM 06/21/2018 8 4     EXTERNAL EGFR 06/21/2018 30     Protein/Creat Ratio (HIS* 06/21/2018 0 17     Ferritin 06/21/2018 100     PTH, Intact 06/21/2018 92 9     Iron 06/21/2018 46*    Phosphorus 06/21/2018 3 7      Imaging: No results found  Review of Systems:  Review of Systems   Constitutional: Negative for diaphoresis, fatigue, fever and unexpected weight change  HENT: Negative  Respiratory: Negative for cough, shortness of breath and wheezing  Cardiovascular: Negative for chest pain, palpitations and leg swelling  Gastrointestinal: Negative for abdominal pain, diarrhea and nausea  Musculoskeletal: Negative for gait problem and myalgias  Skin: Negative for rash  Neurological: Negative for dizziness and numbness  Psychiatric/Behavioral: Negative  Physical Exam:  Physical Exam   Constitutional: She is oriented to person, place, and time  She appears well-developed and well-nourished  HENT:   Head: Normocephalic and atraumatic  Eyes: Pupils are equal, round, and reactive to light  Neck: Normal range of motion  Neck supple  No JVD present  Cardiovascular: Regular rhythm, S1 normal, S2 normal and normal pulses  Pulses:       Carotid pulses are 2+ on the right side, and 2+ on the left side  Pulmonary/Chest: Effort normal and breath sounds normal  She has no wheezes  She has no rales  Abdominal: Soft  Bowel sounds are normal  There is no tenderness  Musculoskeletal: Normal range of motion  She exhibits no edema or tenderness  Neurological: She is alert and oriented to person, place, and time  She has normal reflexes  No cranial nerve deficit  Skin: Skin is warm  Psychiatric: She has a normal mood and affect

## 2018-10-17 ENCOUNTER — TELEPHONE (OUTPATIENT)
Dept: NEPHROLOGY | Facility: CLINIC | Age: 83
End: 2018-10-17

## 2018-10-17 DIAGNOSIS — R60.0 LEG EDEMA: Primary | ICD-10-CM

## 2018-10-17 RX ORDER — FUROSEMIDE 40 MG/1
20 TABLET ORAL DAILY
Qty: 30 TABLET | Refills: 0 | Status: SHIPPED | OUTPATIENT
Start: 2018-10-17 | End: 2019-02-18

## 2018-10-17 RX ORDER — POTASSIUM CHLORIDE 750 MG/1
10 TABLET, EXTENDED RELEASE ORAL DAILY
Qty: 30 TABLET | Refills: 5 | Status: SHIPPED | OUTPATIENT
Start: 2018-10-17 | End: 2019-07-08

## 2018-10-17 NOTE — TELEPHONE ENCOUNTER
Spoke to patient's daughter over the phone to discuss last lab results  As per daughter, patient is at nursing home [facility] in [place]  Spoke to patient's nurse Luis Ott at [facility] and advised to have her hold lisinopril due to worsening Cr 2 2 from previous value 1 5  Repeat BMP in 1 week after holding lisinopril  As per nurse, patient does not have any leg edema  Denies any worsening dyspnea  Will hold Lasix and potassium supplement for next three days and then restarted lower dose with Lasix 20 mg p o  daily and potassium chloride 10 mEq p o  daily after holding three days  Also advised to check blood pressure daily for next seven days and if blood pressure remains greater than 150/90, I advised her to call back  As per nurse, no obvious pre renal symptoms  Weight has been stable in the range of 172 to 173 lb

## 2019-01-30 ENCOUNTER — TELEPHONE (OUTPATIENT)
Dept: NEPHROLOGY | Facility: CLINIC | Age: 84
End: 2019-01-30

## 2019-02-18 ENCOUNTER — OFFICE VISIT (OUTPATIENT)
Dept: NEPHROLOGY | Facility: CLINIC | Age: 84
End: 2019-02-18
Payer: MEDICARE

## 2019-02-18 VITALS
SYSTOLIC BLOOD PRESSURE: 122 MMHG | WEIGHT: 177.6 LBS | HEIGHT: 64 IN | BODY MASS INDEX: 30.32 KG/M2 | DIASTOLIC BLOOD PRESSURE: 72 MMHG | HEART RATE: 80 BPM

## 2019-02-18 DIAGNOSIS — N18.30 CKD (CHRONIC KIDNEY DISEASE), STAGE III (HCC): Primary | ICD-10-CM

## 2019-02-18 DIAGNOSIS — I12.9 BENIGN HYPERTENSION WITH CHRONIC KIDNEY DISEASE, STAGE III (HCC): ICD-10-CM

## 2019-02-18 DIAGNOSIS — N25.81 SECONDARY HYPERPARATHYROIDISM (HCC): ICD-10-CM

## 2019-02-18 DIAGNOSIS — R80.1 PERSISTENT PROTEINURIA: ICD-10-CM

## 2019-02-18 DIAGNOSIS — D63.1 ANEMIA IN STAGE 3 CHRONIC KIDNEY DISEASE (HCC): ICD-10-CM

## 2019-02-18 DIAGNOSIS — R60.0 LEG EDEMA: ICD-10-CM

## 2019-02-18 DIAGNOSIS — N18.30 ANEMIA IN STAGE 3 CHRONIC KIDNEY DISEASE (HCC): ICD-10-CM

## 2019-02-18 DIAGNOSIS — N18.30 BENIGN HYPERTENSION WITH CHRONIC KIDNEY DISEASE, STAGE III (HCC): ICD-10-CM

## 2019-02-18 PROCEDURE — 99214 OFFICE O/P EST MOD 30 MIN: CPT | Performed by: INTERNAL MEDICINE

## 2019-02-18 RX ORDER — FUROSEMIDE 20 MG/1
20 TABLET ORAL 3 TIMES WEEKLY
Qty: 30 TABLET | Refills: 0 | Status: SHIPPED | OUTPATIENT
Start: 2019-02-18 | End: 2020-02-24

## 2019-02-18 RX ORDER — DOCUSATE SODIUM 100 MG/1
100 CAPSULE, LIQUID FILLED ORAL 2 TIMES DAILY
COMMUNITY

## 2019-02-18 RX ORDER — HYDROCODONE BITARTRATE AND ACETAMINOPHEN 5; 325 MG/1; MG/1
0.5 TABLET ORAL EVERY 6 HOURS PRN
Status: ON HOLD | COMMUNITY
End: 2020-02-26

## 2019-02-18 RX ORDER — POLYETHYLENE GLYCOL 3350 17 G/17G
17 POWDER, FOR SOLUTION ORAL DAILY
COMMUNITY

## 2019-02-18 NOTE — PROGRESS NOTES
NEPHROLOGY OUTPATIENT PROGRESS NOTE   Ashok Plasencia 80 y o  female MRN: [id number]  DATE: 2/18/2019  Reason for visit:   Chief Complaint   Patient presents with    Follow-up    Chronic Kidney Disease     ASSESSMENT and PLAN:  Chronic kidney disease stage III (baseline serum creatinine 1 5, previous baseline used to be 1 7-1 8 )  - CKD likely due to underlying hypertensive nephrosclerosis  - Last serum creatinine 1 4 in January 2019 stable and back to baseline  Patient had an episode of NITIN with peak creatinine 2 2 after starting lisinopril and since then lisinopril is on hold  - patient had refused to consider dialysis in the future during my previous discussions and understands all the risks of not considering dialysis including death  - Avoid nephrotoxins, NSAIDs or IV contrast  -recent urinalysis shows no proteinuria, 6 to 10 RBCs, few bacteria, multiple leukocytes, thought to have UTI and she was treated with antibiotic as per patient  Her urine culture was negative in January 2019    - We'll repeat BMP next month and before next visit  - Renal ultrasound previously shows right atrophied kidney with 7 9 cm in size and left side kidney 12 1 cm  - ? Component of renal artery stenosis causing ischemic nephropathy (although she does not have any history of stroke, coronary artery disease, peripheral artery disease documented)  - Given stable renal function, we'll continue to monitor for now     Leg edema, overall improved and almost resolved   -based on nursing home records, patient is on Lasix 20 mg 3 times a week  Will reduce potassium supplement 10 mEq from daily to 3 times a week    - Advised to call back if has greater than 5 pound weight gain in 2 days    - Strict salt restricted diet     Hypertension  - Her blood pressure is above goal in the office today     -patient was given lisinopril with significant worsening of creatinine up to 2 2 when  lisinopril was discontinued and will continue to hold lisinopril for now    -increase amlodipine from 5 mg p  O  Daily to b i d  Dosing  Continue current metoprolol 25 mg p o  B i d , and Lasix 20 mg 3 times a week    -advised to call back if blood pressure remains persistently greater than 140/90     Secondary hyperparathyroidism  - PTH level 92 in June 2018 overall stable  Repeat before next visit     CKD anemia/ iron deficiency anemia  - Last iron saturation 19%  Currently remains on p o  iron supplement  We'll repeat iron studies before next visit  Continue same for now  - Hemoglobin overall stable     Proteinuria, improving, last UPC ratio 170 mg slightly worsened from previous value 140 mg   -continue to hold lisinopril   - Continue to monitor  Likely secondary to long-term hypertension  -repeat UPC ratio before next visit  Diagnoses and all orders for this visit:    CKD (chronic kidney disease), stage III ([facility] )  -     Basic metabolic panel; Future  -     Phosphorus; Future  -     PTH, intact; Future  -     Magnesium; Future  -     Urinalysis with reflex to microscopic; Future  -     Protein / creatinine ratio, urine; Future  -     Vitamin D 25 hydroxy; Future    Benign hypertension with chronic kidney disease, stage III (HCC)  -     Urinalysis with reflex to microscopic; Future    Anemia in stage 3 chronic kidney disease (HCC)  -     CBC; Future  -     Iron Saturation %; Future  -     Ferritin; Future    Persistent proteinuria  -     Protein / creatinine ratio, urine; Future    Secondary hyperparathyroidism (HCC)  -     PTH, intact; Future  -     Vitamin D 25 hydroxy; Future    Leg edema  -     furosemide (LASIX) 20 mg tablet; Take 1 tablet (20 mg total) by mouth 3 (three) times a week for 30 days    Other orders  -     docusate sodium (COLACE) 100 mg capsule; Take 100 mg by mouth 2 (two) times a day  -     cyanocobalamin 1000 MCG tablet; Take 100 mcg by mouth daily  -     HYDROcodone-acetaminophen (NORCO) 5-325 mg per tablet;  Take 0 5 tablets by mouth every 6 (six) hours as needed for pain  -     Melatonin-Pyridoxine (MELATIN PO); Take 5 mg by mouth  -     polyethylene glycol (MIRALAX) 17 g packet; Take 17 g by mouth daily        SUBJECTIVE / HPI:  Patient is 19-year-old female with significant medical issues of CK D stage IV baseline creatinine 1 5, (previously 1 7-1 8), hypertension, A  fib, sick sinus syndrome, status post PPM, comes for regular follow-up of CK D  she is at [facility]  She has been feeling well  Denies any worsening dyspnea  Leg edema is overall much improved  She has urinary incontinence issues  No new complaint  She denies any recent NSAID exposure  During last visit she was started on lisinopril when creatinine had worsened up to 2 2 and lisinopril was held  Since then creatinine has improved back to 1 4 at her baseline  Also recent urinalysis was concerning for UTI and she was treated with antibiotic as per patient  Her urine culture was negative  Her previous renal ultrasound showed right atrophied kidney  currently patient is taking Lasix 20 mg 3 times a week  She has a good appetite  REVIEW OF SYSTEMS:  More than 10 point review of systems were obtained and discussed in length with the patient  Complete review of systems were negative / unremarkable except mentioned above  PHYSICAL EXAM:  Vitals:    02/18/19 1302   BP: 122/72   BP Location: Right arm   Patient Position: Sitting   Cuff Size: Adult   Pulse: 80   Weight: 80 6 kg (177 lb 9 6 oz)   Height: 5' 4" (1 626 m)     Body mass index is 30 48 kg/m²  Physical Exam   Constitutional: She is oriented to person, place, and time  She appears well-developed and well-nourished  HENT:   Head: Normocephalic and atraumatic  Right Ear: External ear normal    Left Ear: External ear normal    Eyes: Pupils are equal, round, and reactive to light  Conjunctivae and EOM are normal    Neck: Neck supple  No JVD present     Cardiovascular: Normal rate and normal heart sounds  Pulmonary/Chest: Effort normal and breath sounds normal  She has no wheezes  She has no rales  Abdominal: Soft  Bowel sounds are normal  She exhibits no distension  There is no tenderness  Musculoskeletal: She exhibits no edema or tenderness  Neurological: She is alert and oriented to person, place, and time  Skin: Skin is warm and dry  No rash noted  Psychiatric: She has a normal mood and affect  Her behavior is normal    Vitals reviewed  PAST MEDICAL HISTORY:  Past Medical History:   Diagnosis Date    A-fib [facility])     Achilles tendinitis     Anemia     Anxiety     Valverde's esophagus     Bursitis of hip     Cardiac arrhythmia     Diastolic CHF (HCC)     Dyspnea     Esophageal ulcer     Fatigue     GERD (gastroesophageal reflux disease)     Hiatal hernia     Hypercholesteremia     Hyperparathyroidism ([address] )     Hypertension     Insomnia     Kidney disease, chronic, stage IV (GFR 15-29 ml/min) ([facility])     Pulmonary hypertension (HCC)     SSS (sick sinus syndrome) ([facility])     Vitamin D deficiency        PAST SURGICAL HISTORY:  Past Surgical History:   Procedure Laterality Date    CARDIAC PACEMAKER PLACEMENT      ESOPHAGOGASTRODUODENOSCOPY N/A 2/10/2017    Procedure: ESOPHAGOGASTRODUODENOSCOPY (EGD); Surgeon: Lolita Izquierdo MD;  [facility]; Service:     PARAESOPHAGEAL HERNIA REPAIR N/A 4/2/2017    Procedure: REPAIR HERNIA PARAESOPHAGEAL  LAPAROSCOPIC HAND-ASSISTED WITH GASTROPEXY; Repair of umbilical incisional hernia no mesh;  Surgeon: Jaycee Segura MD;  [facility];  Service:     MD ESOPHAGOGASTRODUODENOSCOPY TRANSORAL DIAGNOSTIC N/A 4/11/2016    Procedure: ESOPHAGOGASTRODUODENOSCOPY (EGD); Surgeon: Carine Lafleur MD;  [facility];   Service: Gastroenterology       SOCIAL HISTORY:  Social History     Substance and Sexual Activity   Alcohol Use No    Comment: social     Social History     Substance and Sexual Activity   Drug Use No     Social History     Tobacco Use   Smoking Status Never Smoker   Smokeless Tobacco Never Used       FAMILY HISTORY:  Family History   Problem Relation Age of Onset    No Known Problems Mother     No Known Problems Father     No Known Problems Sister     No Known Problems Brother        MEDICATIONS:    Current Outpatient Medications:     amLODIPine (NORVASC) 5 mg tablet, Take 5 mg by mouth 2 (two) times a day, Disp: , Rfl:     cyanocobalamin 1000 MCG tablet, Take 100 mcg by mouth daily, Disp: , Rfl:     docusate sodium (COLACE) 100 mg capsule, Take 100 mg by mouth 2 (two) times a day, Disp: , Rfl:     ferrous sulfate 325 (65 Fe) mg tablet, Take 325 mg by mouth daily with breakfast, Disp: , Rfl:     furosemide (LASIX) 20 mg tablet, Take 1 tablet (20 mg total) by mouth 3 (three) times a week for 30 days, Disp: 30 tablet, Rfl: 0    HYDROcodone-acetaminophen (NORCO) 5-325 mg per tablet, Take 0 5 tablets by mouth every 6 (six) hours as needed for pain, Disp: , Rfl:     Melatonin-Pyridoxine (MELATIN PO), Take 5 mg by mouth, Disp: , Rfl:     METOPROLOL TARTRATE PO, Take 25 mg by mouth 2 (two) times a day  , Disp: , Rfl:     Multiple Vitamin (MULTIVITAMIN) tablet, Take 1 tablet by mouth daily  , Disp: , Rfl:     omeprazole (PriLOSEC) 20 mg delayed release capsule, Take 20 mg by mouth daily, Disp: , Rfl:     pantoprazole (PROTONIX) 40 mg tablet, Take 1 tablet by mouth 2 (two) times a day before meals, Disp: 60 tablet, Rfl: 0    polyethylene glycol (MIRALAX) 17 g packet, Take 17 g by mouth daily, Disp: , Rfl:     potassium chloride (K-DUR,KLOR-CON) 10 mEq tablet, Take 1 tablet (10 mEq total) by mouth daily, Disp: 30 tablet, Rfl: 5    rivaroxaban (XARELTO) 10 mg tablet, Take 10 mg by mouth daily, Disp: , Rfl:     Lab Results:   Creatinine 1 4, hemoglobin 12 3 in January 2019

## 2019-03-19 ENCOUNTER — IN-CLINIC DEVICE VISIT (OUTPATIENT)
Dept: CARDIOLOGY CLINIC | Facility: CLINIC | Age: 84
End: 2019-03-19
Payer: MEDICARE

## 2019-03-19 DIAGNOSIS — I48.19 PERSISTENT ATRIAL FIBRILLATION (HCC): Primary | ICD-10-CM

## 2019-03-19 DIAGNOSIS — Z95.0 CARDIAC PACEMAKER IN SITU: ICD-10-CM

## 2019-03-19 PROCEDURE — 93280 PM DEVICE PROGR EVAL DUAL: CPT | Performed by: INTERNAL MEDICINE

## 2019-03-19 NOTE — PROGRESS NOTES
Results for orders placed or performed in visit on 03/19/19   Cardiac EP device report    Narrative    DEVICE INTERROGATED IN THE [place] OFFICE: BATTERY VOLTAGE ADEQUATE  AP 99%  16%  ALL AVAILABLE LEAD PARAMETERS WITHIN NORMAL LIMITS  NO NEW SIGNIFICANT HIGH RATE EPISODES  DECREASE MADE TO RA AMP TO PROMOTE DEVICE LONGEVITY WHILE MAINTAINING AN APPROPRIATE SAFETY MARGIN  NORMAL DEVICE FUNCTION   NC

## 2019-04-11 ENCOUNTER — TELEPHONE (OUTPATIENT)
Dept: NEPHROLOGY | Facility: CLINIC | Age: 84
End: 2019-04-11

## 2019-06-21 NOTE — PROGRESS NOTES
"  Discussion/Summary  Normal device function     AF, on eliquis  Results/Data  Cardiac Device Remote [id number] 03:22PM [employer]     Test Name Result Flag Reference   MISCELLANEOUS COMMENT      CARELINK TRANSMISSION: BATTERY VOLTAGE ADEQUATE (11 YRS)  AP-7%, -27%  NO NEW SIGNIFICANT HIGH RATE EPISODES  PT IN AF & ON ELIQUIS & METOPROLOL  ALL AVAILABLE LEAD PARAMETERS WITHIN NORMAL LIMITS  NORMAL DEVICE FUNCTION  GV   Cardiac Electrophysiology Report      [id number]  pdf   DEVICE TYPE Pacemaker       Cardiac Electrophysiology Report [id number] 03:22PM Kory Willoughby     Test Name Result Flag Reference   Cardiac Electrophysiology Report      [id number]  pdf     Signatures   Electronically signed by : Bubba Kimble RN; Dec 16 2016  2:38PM EST                       (Author)    Electronically signed by : DAVIDE Fuller ; Dec 18 2016  8:53AM EST                       (Author)    " Render Note In Bullet Format When Appropriate: No Render Post-Care Instructions In Note?: yes Detail Level: Detailed Number Of Freeze-Thaw Cycles: 1 freeze-thaw cycle Duration Of Freeze Thaw-Cycle (Seconds): 5 Post-Care Instructions: I reviewed with the patient in detail post-care instructions. Patient is to wear sunprotection, and avoid picking at any of the treated lesions. Pt may apply Vaseline to crusted or scabbing areas. Consent: The patient's consent was obtained including but not limited to risks of crusting, scabbing, blistering, scarring, darker or lighter pigmentary change, recurrence, incomplete removal and infection.

## 2019-07-08 ENCOUNTER — OFFICE VISIT (OUTPATIENT)
Dept: NEPHROLOGY | Facility: CLINIC | Age: 84
End: 2019-07-08
Payer: COMMERCIAL

## 2019-07-08 VITALS
DIASTOLIC BLOOD PRESSURE: 72 MMHG | HEART RATE: 80 BPM | WEIGHT: 173 LBS | SYSTOLIC BLOOD PRESSURE: 140 MMHG | BODY MASS INDEX: 29.53 KG/M2 | HEIGHT: 64 IN

## 2019-07-08 DIAGNOSIS — N18.30 CKD (CHRONIC KIDNEY DISEASE), STAGE III (HCC): Primary | ICD-10-CM

## 2019-07-08 DIAGNOSIS — N18.30 ANEMIA IN STAGE 3 CHRONIC KIDNEY DISEASE (HCC): ICD-10-CM

## 2019-07-08 DIAGNOSIS — I12.9 BENIGN HYPERTENSION WITH CHRONIC KIDNEY DISEASE, STAGE III (HCC): ICD-10-CM

## 2019-07-08 DIAGNOSIS — R60.0 LEG EDEMA: ICD-10-CM

## 2019-07-08 DIAGNOSIS — N25.81 SECONDARY HYPERPARATHYROIDISM (HCC): ICD-10-CM

## 2019-07-08 DIAGNOSIS — N18.30 BENIGN HYPERTENSION WITH CHRONIC KIDNEY DISEASE, STAGE III (HCC): ICD-10-CM

## 2019-07-08 DIAGNOSIS — D63.1 ANEMIA IN STAGE 3 CHRONIC KIDNEY DISEASE (HCC): ICD-10-CM

## 2019-07-08 DIAGNOSIS — R80.1 PERSISTENT PROTEINURIA: ICD-10-CM

## 2019-07-08 DIAGNOSIS — E55.9 VITAMIN D DEFICIENCY: ICD-10-CM

## 2019-07-08 LAB
BACTERIA UR QL AUTO: ABNORMAL /HPF
EXT GLUCOSE BLD: 88
EXTERNAL ANION GAP: 8
EXTERNAL BUN: 45
EXTERNAL CALCIUM: 8.5
EXTERNAL CHLORIDE: 109
EXTERNAL CO2: 23
EXTERNAL CREATININE: 1.59
EXTERNAL EGFR: 28
EXTERNAL FERRITIN: 43
EXTERNAL IRON: 20
EXTERNAL MAGNESIUM: 2.5
EXTERNAL MCV: 86
EXTERNAL PHOSPHORUS: 4.4
EXTERNAL POTASSIUM: 4.6
EXTERNAL PTH: 60.4
EXTERNAL RDW: 14.7
EXTERNAL SODIUM: 140
EXTERNAL VITAMIN D,25: 22
GLUCOSE UR STRIP-MCNC: NEGATIVE MG/DL
HCT VFR BLD AUTO: 26.9 % (ref 34.8–46.1)
HGB BLD-MCNC: 8.7 G/DL (ref 11.5–15.4)
HGB UR QL STRIP.AUTO: ABNORMAL
KETONES UR STRIP-MCNC: NEGATIVE MG/DL
LEUKOCYTE ESTERASE UR QL STRIP: ABNORMAL
MCH RBC QN AUTO: 27.9 PG
MCHC. (HISTORICAL): 32.4
NITRITE UR QL STRIP: NEGATIVE
PH UR STRIP.AUTO: 5 [PH] (ref 4.5–8)
PLATELET # BLD AUTO: 218 THOUSANDS/UL (ref 149–390)
PMV BLD AUTO: 7.6 FL (ref 8.9–12.7)
PROT UR STRIP-MCNC: ABNORMAL MG/DL
RBC # BLD AUTO: 3.13 MILLION/UL (ref 3.81–5.12)
RBC #/AREA URNS AUTO: ABNORMAL /HPF
SP GR UR STRIP.AUTO: 1.02 (ref 1–1.03)
SQUAMOUS #/AREA URNS HPF: ABNORMAL /[HPF]
WBC # BLD AUTO: 6.4 THOUSAND/UL
WBC #/AREA URNS AUTO: ABNORMAL /HPF

## 2019-07-08 PROCEDURE — 99214 OFFICE O/P EST MOD 30 MIN: CPT | Performed by: INTERNAL MEDICINE

## 2019-07-08 RX ORDER — ACETAMINOPHEN 500 MG
500 TABLET ORAL EVERY 6 HOURS PRN
COMMUNITY

## 2019-07-08 RX ORDER — DIAPER,BRIEF,INFANT-TODD,DISP
EACH MISCELLANEOUS 4 TIMES DAILY PRN
Status: ON HOLD | COMMUNITY
End: 2020-02-26

## 2019-07-08 RX ORDER — POTASSIUM CHLORIDE 750 MG/1
20 TABLET, EXTENDED RELEASE ORAL 3 TIMES WEEKLY
Qty: 15 TABLET | Refills: 5 | Status: SHIPPED | OUTPATIENT
Start: 2019-07-08 | End: 2019-12-10

## 2019-07-08 RX ORDER — SENNA AND DOCUSATE SODIUM 50; 8.6 MG/1; MG/1
1 TABLET, FILM COATED ORAL AS NEEDED
COMMUNITY

## 2019-07-08 NOTE — PROGRESS NOTES
NEPHROLOGY OUTPATIENT PROGRESS NOTE   Jayashree Plasencia 80 y o  female MRN: [id number]  DATE: 7/8/2019  Reason for visit:   Chief Complaint   Patient presents with    Chronic Kidney Disease     ASSESSMENT and PLAN:  Chronic kidney disease stage III (baseline serum creatinine 1 5, previous baseline used to be 1 7-1 8 )  - CKD likely due to underlying hypertensive nephrosclerosis  - Last serum creatinine  1 5 in July 2019 at baseline    - patient had refused to consider dialysis in the future during my previous discussions and understands all the risks of not considering dialysis including death    - Avoid nephrotoxins, NSAIDs or IV contrast  -recent urinalysis shows greater than 100 RBCs, greater than 100 WBCs, 3+ bacteria, multiple epithelial cells, multiple leukocytes, 2+ proteinuria  Patient denies any urinary symptoms and her urinalysis may represent asymptomatic bacteriuria   -she has significant and severe hemorrhoid issues and usually bleeds almost every day, ? Contributing occasionally to microscopic hematuria     - We'll repeat BMP before next visit  - Renal ultrasound previously shows right atrophied kidney with 7 9 cm in size and left side kidney 12 1 cm  - ? Component of renal artery stenosis causing ischemic nephropathy (although she does not have any history of stroke, coronary artery disease, peripheral artery disease documented)  - Given stable renal function, we'll continue to monitor for now     Leg edema, overall improved and almost resolved   -based on nursing home records, patient is on Lasix 20 mg 3 times a week along with potassium supplement 3 times a week  Continue same   - Advised to call back if has greater than 5 pound weight gain in 2 days     - Strict salt restricted diet     Hypertension  - Her blood pressure is acceptable in the office today  -  continue current amlodipine 5 mg b i d , Lasix 20 mg 3 times a week, metoprolol 50 mg b i d    Remains off lisinopril due to prior episode of NITIN with significantly increased creatinine  -advised to call back if blood pressure remains persistently greater than 140/90     Secondary hyperparathyroidism  - PTH level 60 in 7/19 overall stable  Repeat before next visit     CKD anemia/ iron deficiency anemia  - Last iron saturation 19%   Currently remains on p o  iron supplement  I only have iron level 20  No iron saturation available   We'll repeat iron studies , CBC later this month    -hemoglobin slowly dropping to 8 7  She has significant hemorrhoid issues and usually bleeds each time when she wipes herself  Also most recent urinalysis shows significant microscopic hematuria although concern for asymptomatic bacteriuria  -if hemoglobin continue to drop further, may need further GI evaluation  Will defer further management to PCP  Vitamin-D insufficiency, last vitamin-D 25 hydroxy level 22 in July 2019  Start vitamin-D 1000 units p o  Daily     Proteinuria, improving, last UPC ratio 170 mg slightly worsened from previous value 140 mg   -lisinopril remains on hold due to prior significant elevation in creatinine   - Continue to monitor  Likely secondary to long-term hypertension    -repeat UPC ratio before next visit    Diagnoses and all orders for this visit:    CKD (chronic kidney disease), stage III ([facility] )  -     Basic metabolic panel; Future  -     CBC; Future  -     Magnesium; Future  -     Phosphorus; Future  -     Urinalysis with reflex to microscopic; Future  -     Vitamin D 25 hydroxy; Future    Leg edema  -     potassium chloride (K-DUR,KLOR-CON) 10 mEq tablet; Take 2 tablets (20 mEq total) by mouth 3 (three) times a week    Persistent proteinuria  -     Protein / creatinine ratio, urine; Future  -     Urinalysis with reflex to microscopic; Future    Secondary hyperparathyroidism ([facility] )  -     Phosphorus; Future  -     PTH, intact; Future  -     Vitamin D 25 hydroxy;  Future    Anemia in stage 3 chronic kidney disease (HCC)  -     CBC; Future  -     Iron Saturation %; Future  -     TIBC; Future  -     CBC; Future    Benign hypertension with chronic kidney disease, stage III (HCC)    Vitamin D deficiency  -     Vitamin D 25 hydroxy; Future    Other orders  -     aspirin 81 MG tablet; Take 81 mg by mouth daily  -     bisacodyl (FLEET) 10 MG/30ML ENEM; Insert 10 mg into the rectum once as needed for constipation  -     Menthol, Topical Analgesic, (COLD & HOT MEDICATED PATCH EX); Apply 1 patch topically as needed  -     miconazole (MICOTIN) 2 % powder; Apply topically as needed for itching  -     sodium chloride (CHELI 128) 2 % hypertonic ophthalmic solution; Administer 1 drop to the right eye as needed for irritation  -     nystatin (MYCOSTATIN) 500,000 units/5 mL suspension; Apply 500,000 Units to the mouth or throat once as needed  -     hydrocortisone (PREPARATION H HYDROCORTISONE) 1 % cream; Apply topically 4 (four) times a day as needed  -     Phenylephrine-Cocoa Butter (PREPARATION H) 0 25-88 44 % SUPP; Insert into the rectum as needed  -     senna-docusate sodium (SENOKOT-S) 8 6-50 mg per tablet; Take 1 tablet by mouth as needed for constipation  -     acetaminophen (TYLENOL) 500 mg tablet; Take 500 mg by mouth every 6 (six) hours as needed for mild pain        SUBJECTIVE / HPI:  Patient is 80year-old female with significant medical issues of CKD stage IV baseline creatinine 1 5, (previously 1 7-1 8), hypertension, A  fib, sick sinus syndrome, status post PPM, comes for regular follow-up of CK D  [facility] is at [facility]  She has been feeling well  Denies any worsening dyspnea  Leg edema is overall much improved  She has urinary incontinence issues  No new complaint  She denies any recent NSAID exposure  she has significant hemorrhoidal issues and usually bleeds almost daily when she is wiping herself  She denies any lightheadedness or dizziness    Denies any urinary complaint including no dysuria, no foul smell no cloudy urine, no fever or chills  She continues to remain off lisinopril due to significant elevation in creatinine in the recent past      Her previous renal ultrasound showed right atrophied kidney   currently patient is taking Lasix 20 mg 3 times a week  She has a good appetite  REVIEW OF SYSTEMS:  More than 10 point review of systems were obtained and discussed in length with the patient  Complete review of systems were negative / unremarkable except mentioned above  PHYSICAL EXAM:  Vitals:    07/08/19 1523 07/08/19 1544   BP: 122/76 140/72   BP Location: Right arm    Patient Position: Sitting    Cuff Size: Adult    Pulse: 80    Weight: 78 5 kg (173 lb)    Height: 5' 4" (1 626 m)      Body mass index is 29 7 kg/m²  Physical Exam   Constitutional: She is oriented to person, place, and time  She appears well-developed and well-nourished  HENT:   Head: Normocephalic and atraumatic  Right Ear: External ear normal    Left Ear: External ear normal    Eyes: Pupils are equal, round, and reactive to light  Conjunctivae and EOM are normal    Neck: Neck supple  No JVD present  Cardiovascular: Normal rate and normal heart sounds  Pulmonary/Chest: Effort normal and breath sounds normal  She has no wheezes  She has no rales  Abdominal: Soft  Bowel sounds are normal  She exhibits no distension  There is no tenderness  Musculoskeletal: She exhibits no edema or tenderness  Neurological: She is alert and oriented to person, place, and time  Skin: Skin is warm and dry  No rash noted  Psychiatric: She has a normal mood and affect  Her behavior is normal    Vitals reviewed        PAST MEDICAL HISTORY:  Past Medical History:   Diagnosis Date    A-fib [facility])     Achilles tendinitis     Anemia     Anxiety     Valverde's esophagus     Bursitis of hip     Cardiac arrhythmia     Diastolic CHF (HCC)     Dyspnea     Esophageal ulcer     Fatigue     GERD (gastroesophageal reflux disease)     Hiatal hernia     Hypercholesteremia     [facility]par[facility]roidism ([facility] )     Hypertension     Insomnia     Kidney disease, chronic, stage IV (GFR 15-29 ml/min) (HCC)     Pulmonary hypertension (HCC)     SSS (sick sinus syndrome) (HCC)     Vitamin D deficiency        PAST SURGICAL HISTORY:  Past Surgical History:   Procedure Laterality Date    CARDIAC PACEMAKER PLACEMENT      ESOPHAGOGASTRODUODENOSCOPY N/A 2/10/2017    Procedure: ESOPHAGOGASTRODUODENOSCOPY (EGD); Surgeon: Gonzalo Hernandez MD;  [facility]; Service:     PARAESOPHAGEAL HERNIA REPAIR N/A 4/2/2017    Procedure: REPAIR HERNIA PARAESOPHAGEAL  LAPAROSCOPIC HAND-ASSISTED WITH GASTROPEXY; Repair of umbilical incisional hernia no mesh;  Surgeon: Isabel Taylor MD;  [facility]: [facility];  Service:     [place] ESOPHAGOGASTRODUODENOSCOPY TRANSORAL DIAGNOSTIC N/A 4/11/2016    Procedure: ESOPHAGOGASTRODUODENOSCOPY (EGD); Surgeon: Romel Miller MD;  Location: [facility];   Service: Gastroenterology       SOCIAL HISTORY:  Social History     Substance and Sexual Activity   Alcohol Use No    Comment: social     Social History     Substance and Sexual Activity   Drug Use No     Social History     Tobacco Use   Smoking Status Never Smoker   Smokeless Tobacco Never Used       FAMILY HISTORY:  Family History   Problem Relation Age of Onset    No Known Problems Mother     No Known Problems Father     No Known Problems Sister     No Known Problems Brother        MEDICATIONS:    Current Outpatient Medications:     acetaminophen (TYLENOL) 500 mg tablet, Take 500 mg by mouth every 6 (six) hours as needed for mild pain, Disp: , Rfl:     amLODIPine (NORVASC) 5 mg tablet, Take 5 mg by mouth 2 (two) times a day, Disp: , Rfl:     aspirin 81 MG tablet, Take 81 mg by mouth daily, Disp: , Rfl:     bisacodyl (FLEET) 10 MG/30ML ENEM, Insert 10 mg into the rectum once as needed for constipation, Disp: , Rfl:     cyanocobalamin 1000 MCG tablet, Take 100 mcg by mouth daily, Disp: , Rfl:    docusate sodium (COLACE) 100 mg capsule, Take 100 mg by mouth 2 (two) times a day, Disp: , Rfl:     ferrous sulfate 325 (65 Fe) mg tablet, Take 325 mg by mouth daily with breakfast, Disp: , Rfl:     furosemide (LASIX) 20 mg tablet, Take 1 tablet (20 mg total) by mouth 3 (three) times a week for 30 days, Disp: 30 tablet, Rfl: 0    HYDROcodone-acetaminophen (NORCO) 5-325 mg per tablet, Take 0 5 tablets by mouth every 6 (six) hours as needed for pain, Disp: , Rfl:     hydrocortisone (PREPARATION H HYDROCORTISONE) 1 % cream, Apply topically 4 (four) times a day as needed, Disp: , Rfl:     Melatonin-Pyridoxine (MELATIN PO), Take 5 mg by mouth, Disp: , Rfl:     Menthol, Topical Analgesic, (COLD & HOT MEDICATED PATCH EX), Apply 1 patch topically as needed, Disp: , Rfl:     metoprolol tartrate (LOPRESSOR) 50 mg tablet, Take 50 mg by mouth 2 (two) times a day , Disp: , Rfl:     miconazole (MICOTIN) 2 % powder, Apply topically as needed for itching, Disp: , Rfl:     Multiple Vitamin (MULTIVITAMIN) tablet, Take 1 tablet by mouth daily  , Disp: , Rfl:     nystatin (MYCOSTATIN) 500,000 units/5 mL suspension, Apply 500,000 Units to the mouth or throat once as needed, Disp: , Rfl:     omeprazole (PriLOSEC) 20 mg delayed release capsule, Take 20 mg by mouth daily, Disp: , Rfl:     Phenylephrine-Cocoa Butter (PREPARATION H) 0 25-88 44 % SUPP, Insert into the rectum as needed, Disp: , Rfl:     polyethylene glycol (MIRALAX) 17 g packet, Take 17 g by mouth daily, Disp: , Rfl:     potassium chloride (K-DUR,KLOR-CON) 10 mEq tablet, Take 2 tablets (20 mEq total) by mouth 3 (three) times a week, Disp: 15 tablet, Rfl: 5    rivaroxaban (XARELTO) 10 mg tablet, Take 10 mg by mouth daily, Disp: , Rfl:     senna-docusate sodium (SENOKOT-S) 8 6-50 mg per tablet, Take 1 tablet by mouth as needed for constipation, Disp: , Rfl:     sodium chloride (CHELI 128) 2 % hypertonic ophthalmic solution, Administer 1 drop to the right eye as needed for irritation, Disp: , Rfl:     Lab Results:   Results for orders placed or performed in visit on 95/19/80   Basic metabolic panel   Result Value Ref Range    SODIUM 144     POTASSIUM 4 1     CHLORIDE 107     CO2 27     BUN 38     CREATININE 1 53     EXTERNAL CALCIUM 8 4     EXTERNAL EGFR 30     Protein/Creat Ratio 0 17     Ferritin 100 8 - 388 ng/mL    PTH, Intact 92 9     Iron 46 (A) 50 - 170 ug/dL    Phosphorus 3 7 2 3 - 4 1 mg/dL

## 2019-09-12 ENCOUNTER — OFFICE VISIT (OUTPATIENT)
Dept: CARDIOLOGY CLINIC | Facility: CLINIC | Age: 84
End: 2019-09-12
Payer: COMMERCIAL

## 2019-09-12 VITALS
BODY MASS INDEX: 29.88 KG/M2 | DIASTOLIC BLOOD PRESSURE: 70 MMHG | HEIGHT: 64 IN | WEIGHT: 175 LBS | SYSTOLIC BLOOD PRESSURE: 138 MMHG | OXYGEN SATURATION: 95 % | HEART RATE: 79 BPM

## 2019-09-12 DIAGNOSIS — Z95.0 PACEMAKER: ICD-10-CM

## 2019-09-12 DIAGNOSIS — I50.32 CHRONIC DIASTOLIC CHF (CONGESTIVE HEART FAILURE) (HCC): Chronic | ICD-10-CM

## 2019-09-12 DIAGNOSIS — I48.20 CHRONIC ATRIAL FIBRILLATION (HCC): Primary | Chronic | ICD-10-CM

## 2019-09-12 DIAGNOSIS — I12.9 BENIGN HYPERTENSION WITH CHRONIC KIDNEY DISEASE, STAGE III (HCC): ICD-10-CM

## 2019-09-12 DIAGNOSIS — N18.30 BENIGN HYPERTENSION WITH CHRONIC KIDNEY DISEASE, STAGE III (HCC): ICD-10-CM

## 2019-09-12 PROCEDURE — 99213 OFFICE O/P EST LOW 20 MIN: CPT | Performed by: INTERNAL MEDICINE

## 2019-09-14 NOTE — PROGRESS NOTES
Cardiology Follow Up    Radha Palaciochandrika  1/31/1928  [phone]  [address]  [address]  [address]  [phone]    1  Chronic atrial fibrillation ([facility] )     2  Chronic diastolic CHF (congestive heart failure) ([facility] )     3  Benign hypertension with chronic kidney disease, stage III ([facility] )     4  Pacemaker           Discussion/Summary: All of her assessed cardiac problems are stable  I have reviewed her medications and made no changes except for stopping her ASA  No cardiac testing is ordered  RTO 1 year  Interval History:  She has not had any cardiac problems since her last OV  She remains in [facility] and is on Xarelto  She is predominantly V paced  Her pacemaker is functioning normally  She denies CP, dizziness, falls, syncope  She does get SOB with increased levels of activity which is not new for her        Patient Active Problem List   Diagnosis    Paraesophageal hernia with obstruction but no gangrene    [facility] fibrillation [facility])    Chronic diastolic CHF (congestive heart failure) ([facility])    Aspiration pneumonia ([facility])    Acute respiratory failure with hypoxia ([facility])    Benign hypertension with chronic kidney disease, stage III ([facility])    Anemia in CKD (chronic kidney disease)    Proteinuria    [facility] )    Pacemaker     Past Medical History:   Diagnosis Date    A-fib ([place] [address] )     Achilles tendinitis     Anemia     Anxiety     Valverde's esophagus     Bursitis of hip     Cardiac arrhythmia     Di[facility]     Dyspnea     Esophageal ulcer     Fatigue     GERD (gastroesophageal reflux disease)     Hiatal hernia     Hypercholesteremia     [facility]roidism [facility] )     Hypertension     Insomnia     Kidney disease, chronic, stage IV (GFR 15-29 ml/min) ([facility])     Pulmonary hypertension ([facility])     SSS (sick sinus syndrome) ([facility])     Vitamin D deficiency      Social History     Socioeconomic History    Marital status:      Spouse name: Not on file    Number of children: Not on file    Years of education: Not on file    Highest education level: Not on file   Occupational History    Not on file   Social Needs    Financial resource strain: Not on file    Food insecurity:     Worry: Not on file     Inability: Not on file    Transportation needs:     Medical: Not on file     Non-medical: Not on file   Tobacco Use    Smoking status: Never Smoker    Smokeless tobacco: Never Used   Substance and Sexual Activity    Alcohol use: No     Comment: social    Drug use: No    Sexual activity: Not on file   Lifestyle    Physical activity:     Days per week: Not on file     Minutes per session: Not on file    Stress: Not on file   Relationships    Social connections:     Talks on phone: Not on file     Gets together: Not on file     Attends [employer] service: Not on file     Active member of club or organization: Not on file     Attends meetings of clubs or organizations: Not on file     Relationship status: Not on file    Intimate partner violence:     Fear of current or ex partner: Not on file     Emotionally abused: Not on file     Physically abused: Not on file     Forced sexual activity: Not on file   Other Topics Concern    Not on file   Social History Narrative    Not on file      Family History   Problem Relation Age of Onset    No Known Problems Mother     No Known Problems Father     No Known Problems Sister     No Known Problems Brother      Past Surgical History:   Procedure Laterality Date    CARDIAC PACEMAKER PLACEMENT      ESOPHAGOGASTRODUODENOSCOPY N/A 2/10/2017    Procedure: ESOPHAGOGASTRODUODENOSCOPY (EGD); Surgeon: Shaun Grimes MD;  [facility];   Service:     PARAESOPHAGEAL HERNIA REPAIR N/A 4/2/2017    Procedure: REPAIR HERNIA PARAESOPHAGEAL  LAPAROSCOPIC HAND-ASSISTED WITH GASTROPEXY; Repair of umbilical incisional hernia no mesh;  Surgeon: Lisebt Agudelo MD;  [facility];  Service:     [place] ESOPHAGOGASTRODUODENOSCOPY TRANSORAL DIAGNOSTIC N/A 4/11/2016    Procedure: ESOPHAGOGASTRODUODENOSCOPY (EGD); Surgeon: Fatoumata Bhatia MD;  [facility]: [facility]; Service: Gastroenterology       Current Outpatient Medications:     acetaminophen (TYLENOL) 500 mg tablet, Take 500 mg by mouth every 6 (six) hours as needed for mild pain, Disp: , Rfl:     amLODIPine (NORVASC) 5 mg tablet, Take 5 mg by mouth 2 (two) times a day, Disp: , Rfl:     aspirin 81 MG tablet, Take 81 mg by mouth daily, Disp: , Rfl:     bisacodyl (FLEET) 10 MG/30ML ENEM, Insert 10 mg into the rectum once as needed for constipation, Disp: , Rfl:     cyanocobalamin 1000 MCG tablet, Take 100 mcg by mouth daily, Disp: , Rfl:     docusate sodium (COLACE) 100 mg capsule, Take 100 mg by mouth 2 (two) times a day, Disp: , Rfl:     ferrous sulfate 325 (65 Fe) mg tablet, Take 325 mg by mouth daily with breakfast, Disp: , Rfl:     furosemide (LASIX) 20 mg tablet, Take 1 tablet (20 mg total) by mouth 3 (three) times a week for 30 days, Disp: 30 tablet, Rfl: 0    HYDROcodone-acetaminophen (NORCO) 5-325 mg per tablet, Take 0 5 tablets by mouth every 6 (six) hours as needed for pain, Disp: , Rfl:     hydrocortisone (PREPARATION H HYDROCORTISONE) 1 % cream, Apply topically 4 (four) times a day as needed, Disp: , Rfl:     Melatonin-Pyridoxine (MELATIN PO), Take 5 mg by mouth, Disp: , Rfl:     Menthol, Topical Analgesic, (COLD & HOT MEDICATED PATCH EX), Apply 1 patch topically as needed, Disp: , Rfl:     metoprolol tartrate (LOPRESSOR) 50 mg tablet, Take 50 mg by mouth 2 (two) times a day , Disp: , Rfl:     miconazole (MICOTIN) 2 % powder, Apply topically as needed for itching, Disp: , Rfl:     Multiple Vitamin (MULTIVITAMIN) tablet, Take 1 tablet by mouth daily  , Disp: , Rfl:     nystatin (MYCOSTATIN) 500,000 units/5 mL suspension, Apply 500,000 Units to the mouth or throat once as needed, Disp: , Rfl:     omeprazole (PriLOSEC) 20 mg delayed release capsule, Take 20 mg by mouth daily, Disp: , Rfl:     Phenylephrine-Cocoa Butter (PREPARATION H) 0 25-88 44 % SUPP, Insert into the rectum as needed, Disp: , Rfl:     polyethylene glycol (MIRALAX) 17 g packet, Take 17 g by mouth daily, Disp: , Rfl:     potassium chloride (K-DUR,KLOR-CON) 10 mEq tablet, Take 2 tablets (20 mEq total) by mouth 3 (three) times a week, Disp: 15 tablet, Rfl: 5    rivaroxaban (XARELTO) 10 mg tablet, Take 10 mg by mouth daily, Disp: , Rfl:     senna-docusate sodium (SENOKOT-S) 8 6-50 mg per tablet, Take 1 tablet by mouth as needed for constipation, Disp: , Rfl:     sodium chloride (CHELI 128) 2 % hypertonic ophthalmic solution, Administer 1 drop to the right eye as needed for irritation, Disp: , Rfl:   Allergies   Allergen Reactions    Amoxicillin     Penicillins Hives     Vitals:    09/12/19 1836   BP: 138/70   BP Location: Right arm   Patient Position: Sitting   Cuff Size: Adult   Pulse: 79   SpO2: 95%   Weight: 79 4 kg (175 lb)   Height: 5' 4" (1 626 m)     Weight (last 2 days)     Date/Time   Weight    09/12/19 1836   79 4 (175)             Blood pressure 138/70, pulse 79, height 5' 4" (1 626 m), weight 79 4 kg (175 lb), SpO2 95 %  , Body mass index is 30 04 kg/m²      Labs:  Abstract on 07/08/2019   Component Date Value    SODIUM 07/06/2019 140     POTASSIUM 07/06/2019 4 6     CHLORIDE 07/06/2019 109     CO2 07/06/2019 23     BUN 07/06/2019 45     CREATININE 07/06/2019 1 59     Glucose 07/06/2019 88     EXTERNAL CALCIUM 07/06/2019 8 5     ANION GAP 07/06/2019 8     EXTERNAL EGFR 07/06/2019 28     EXTERNAL FERRITIN 07/06/2019 43     IRON 07/06/2019 20     EXTERNAL MAGNESIUM 07/06/2019 2 5     EXTERNAL PHOSPHORUS 07/06/2019 4 4     EXTERNAL VITAMIN D,25 07/06/2019 22     Hemoglobin 07/06/2019 8 7*    Hematocrit 07/06/2019 26 9*    WBC 07/06/2019 6 40     RBC 07/06/2019 3 13*    Platelets 39/35/2404 218     MPV 07/06/2019 7 6*    MCV 07/06/2019 86     MCH 07/06/2019 27 9     MCHC  07/06/2019 32 4     External RDW 07/06/2019 14 7     Specific Gravity, UA 07/06/2019 1 016     pH, UA 07/06/2019 5 0     Protein, UA 07/06/2019 100 (2+)*    Glucose, UA 07/06/2019 Negative     Ketones, UA 07/06/2019 Negative     Blood, UA 07/06/2019 >=1000 (1%)     Leukocytes, UA 07/06/2019 Moderate*    Nitrite, UA 07/06/2019 Negative     RBC, UA 07/06/2019 15->100*    WBC, UA 07/06/2019 5-65     Bacteria, UA 07/06/2019 Moderate*    Squam Epithel, UA 07/06/2019 10-20*    PTH 07/06/2019 60 4      Imaging: No results found  Review of Systems:  Review of Systems   Respiratory: Positive for shortness of breath  Physical Exam:  Physical Exam   Constitutional: She is oriented to person, place, and time  She appears well-developed and well-nourished  HENT:   Head: Normocephalic and atraumatic  Eyes: Pupils are equal, round, and reactive to light  Neck: Normal range of motion  Neck supple  No JVD present  Cardiovascular: Regular rhythm, S1 normal, S2 normal and normal pulses  Pulses:       Carotid pulses are 2+ on the right side, and 2+ on the left side  Pulmonary/Chest: Effort normal and breath sounds normal  She has no wheezes  She has no rales  Abdominal: Soft  Bowel sounds are normal  There is no tenderness  Musculoskeletal: Normal range of motion  She exhibits no edema or tenderness  Neurological: She is alert and oriented to person, place, and time  She has normal reflexes  No cranial nerve deficit  Skin: Skin is warm  Ecchymosis noted  Psychiatric: She has a normal mood and affect

## 2019-10-30 ENCOUNTER — TELEPHONE (OUTPATIENT)
Dept: NEPHROLOGY | Facility: CLINIC | Age: 84
End: 2019-10-30

## 2019-10-30 NOTE — TELEPHONE ENCOUNTER
I called [facility] to schedule patient for December follow up with Dr Maral Dorman in the [place] office but I was unable to get in touch with anyone on her floor  I will try again

## 2019-12-09 ENCOUNTER — DOCUMENTATION (OUTPATIENT)
Dept: NEPHROLOGY | Facility: CLINIC | Age: 84
End: 2019-12-09

## 2019-12-09 LAB
EXT DIFF-ABS BASOPHILS: 0.1
EXT DIFF-ABS EOSINOPHILS: 0.2
EXT DIFF-ABS LYMPHOCYTES: 0.7
EXT DIFF-ABS MONOCYTES: 0.7
EXT DIFF-ABS NEUTROPHILS: 4
EXT GLUCOSE BLD: 92
EXTERNAL ANION GAP: 4
EXTERNAL BUN: 42
EXTERNAL CALCIUM: 8.3
EXTERNAL CHLORIDE: 110
EXTERNAL CO2: 25
EXTERNAL CREATININE: 1.45
EXTERNAL EGFR: 32
EXTERNAL HEMATOCRIT: 29 %
EXTERNAL HEMOGLOBIN: 9.2 G/DL
EXTERNAL MAGNESIUM: 2.7
EXTERNAL MCV: 87
EXTERNAL PHOSPHORUS: 4.1
EXTERNAL PLATELET COUNT: 173 K/ΜL
EXTERNAL POTASSIUM: 5.2
EXTERNAL PTH: 53.8
EXTERNAL RBC: 3.3
EXTERNAL RDW: 16.6
EXTERNAL SODIUM: 139
EXTERNAL VITAMIN D,25: 33
EXTERNAL WBC: 5.7

## 2019-12-10 ENCOUNTER — OFFICE VISIT (OUTPATIENT)
Dept: NEPHROLOGY | Facility: CLINIC | Age: 84
End: 2019-12-10
Payer: COMMERCIAL

## 2019-12-10 VITALS
HEART RATE: 68 BPM | WEIGHT: 171.4 LBS | BODY MASS INDEX: 29.26 KG/M2 | DIASTOLIC BLOOD PRESSURE: 70 MMHG | HEIGHT: 64 IN | SYSTOLIC BLOOD PRESSURE: 142 MMHG

## 2019-12-10 DIAGNOSIS — R80.1 PERSISTENT PROTEINURIA: ICD-10-CM

## 2019-12-10 DIAGNOSIS — N18.30 BENIGN HYPERTENSION WITH CHRONIC KIDNEY DISEASE, STAGE III (HCC): ICD-10-CM

## 2019-12-10 DIAGNOSIS — N18.30 ANEMIA IN STAGE 3 CHRONIC KIDNEY DISEASE (HCC): ICD-10-CM

## 2019-12-10 DIAGNOSIS — I12.9 BENIGN HYPERTENSION WITH CHRONIC KIDNEY DISEASE, STAGE III (HCC): ICD-10-CM

## 2019-12-10 DIAGNOSIS — D63.1 ANEMIA IN STAGE 3 CHRONIC KIDNEY DISEASE (HCC): ICD-10-CM

## 2019-12-10 DIAGNOSIS — E87.5 HYPERKALEMIA: ICD-10-CM

## 2019-12-10 DIAGNOSIS — N18.30 STAGE 3 CHRONIC KIDNEY DISEASE (HCC): Primary | ICD-10-CM

## 2019-12-10 PROCEDURE — 99214 OFFICE O/P EST MOD 30 MIN: CPT | Performed by: INTERNAL MEDICINE

## 2019-12-10 RX ORDER — LOPERAMIDE HYDROCHLORIDE 2 MG/1
2 TABLET ORAL 4 TIMES DAILY PRN
Status: ON HOLD | COMMUNITY
End: 2020-02-26

## 2019-12-10 RX ORDER — OXYCODONE AND ACETAMINOPHEN 7.5; 325 MG/1; MG/1
1 TABLET ORAL EVERY 12 HOURS PRN
COMMUNITY
End: 2020-04-17

## 2019-12-10 RX ORDER — MELATONIN
1000 DAILY
COMMUNITY

## 2019-12-10 NOTE — PROGRESS NOTES
NEPHROLOGY OUTPATIENT PROGRESS NOTE   Robert Plasencia 80 y o  female MRN: [id number]  DATE: 12/10/2019  Reason for visit:   Chief Complaint   Patient presents with    Follow-up    Chronic Kidney Disease     ASSESSMENT and PLAN:  Chronic kidney disease stage III (baseline serum creatinine 1 5, previous baseline used to be 1 7-1 8 )  - CKD likely due to underlying hypertensive nephrosclerosis  - Last serum creatinine 1 4 in December 2019 at baseline    - patient had refused to consider dialysis in the future during my previous discussions and understands all the risks of not considering dialysis including death    - Avoid nephrotoxins, NSAIDs or IV contrast  -urinalysis in July 2019 shows greater than 100 RBCs, 5 to 65 WBCs, moderate bacteria, 2+ proteinuria  Patient did not have any urinary symptoms that time   -she has significant and severe hemorrhoid issues, ? Contributing occasionally to microscopic hematuria  Will repeat urinalysis microscopy before next visit  - Renal ultrasound previously shows right atrophied kidney with 7 9 cm in size and left side kidney 12 1 cm  - ? Component of renal artery stenosis causing ischemic nephropathy (although she does not have any history of stroke, coronary artery disease, peripheral artery disease documented)  - Given stable renal function, we'll continue to monitor for now     Leg edema, improved and stable   -continue Lasix 20 mg 3 times a week  - Advised to call back if has greater than 5 pound weight gain in 2 days     - Strict salt restricted diet     Hypertension  - Her blood pressure is acceptable in the office today  -  continue current amlodipine 5 mg b i d , Lasix 20 mg 3 times a week, metoprolol 50 mg b i d  Remains off lisinopril due to prior episode of NITIN with significantly increased creatinine  -advised to call back if blood pressure remains persistently greater than 140/90    Mild hyperkalemia, serum potassium 5 2    Currently remains on 20 mEq every other day, will discontinue potassium supplement for time being  Repeat BMP in one month      Secondary hyperparathyroidism  - PTH level 53 in December 2019  Repeat before next visit     CKD anemia/ iron deficiency anemia  - Last iron saturation 19%  Patient complaining of constipation issues and will reduce iron supplement from one tablet t i d  To b i d  Dosing  Check CBC, iron studies before next visit  -hemoglobin slightly better 9 2  She has significant hemorrhoid issues with intermittent bleeding issues    -if hemoglobin continue to drop further, may need further GI evaluation  Will defer further management to PCP       Vitamin-D insufficiency, overall improved, vitamin-D level 33 in December 2019  Continue vitamin-D 1000 units p o  Daily     Proteinuria, improving, last UPC ratio 170 mg slightly worsened from previous value 140 mg   -lisinopril remains on hold due to prior significant elevation in creatinine   - Continue to monitor  Likely secondary to long-term hypertension    -repeat UPC ratio before next visit    Adrenal lesion, indeterminate on right side, 2 5 cm on CT scan in 2017  Given indeterminate bilateral adrenal lesion, discussed need for further follow-up  Patient is not interested any further investigation and understands that may not be able to exclude any malignancy  Diagnoses and all orders for this visit:    Stage 3 chronic kidney disease ([address] )  -     Basic metabolic panel; Future  -     Basic metabolic panel; Future  -     Phosphorus; Future  -     PTH, intact; Future  -     Vitamin D 25 hydroxy; Future  -     UA (URINE) with reflex to Scope; Future    Benign hypertension with chronic kidney disease, stage III (HCC)    Anemia in stage 3 chronic kidney disease (HCC)  -     CBC; Future  -     Ferritin; Future  -     Iron Saturation %; Future    Persistent proteinuria  -     Protein / creatinine ratio, urine;  Future  -     UA (URINE) with reflex to Scope; Future    Hyperkalemia  -     Basic metabolic panel; Future    Other orders  -     CALCIUM CARB-CHOLECALCIFEROL PO; Take 1 tablet by mouth daily  -     loperamide (IMODIUM A-D) 2 MG tablet; Take 2 mg by mouth 4 (four) times a day as needed for diarrhea  -     oxyCODONE-acetaminophen (PERCOCET) 7 5-325 MG per tablet; Take 1 tablet by mouth every 12 (twelve) hours as needed for moderate pain  -     cholecalciferol (VITAMIN D3) 1,000 units tablet; Take 1,000 Units by mouth daily        SUBJECTIVE / HPI:  Patient is 80year-old female with significant medical issues of CKD stage IV baseline creatinine 1 5, (previously 1 7-1 8), hypertension, A  fib, sick sinus syndrome, status post PPM, comes for regular follow-up of CK D  she is at [facility]  She has been feeling well  Denies any worsening dyspnea   Leg edema is overall much improved  She has urinary incontinence issues  No new complaint  She denies any recent NSAID exposure   she has significant hemorrhoidal issues and intermittently bleeds  She denies any lightheadedness or dizziness  Denies any urinary complaint including no dysuria, no foul smell no cloudy urine, no fever or chills  She continues to remain off lisinopril due to significant elevation in creatinine in the recent past      Her previous renal ultrasound showed right atrophied kidney   currently patient is taking Lasix 20 mg 3 times a week  She has a good appetite  REVIEW OF SYSTEMS:  More than 10 point review of systems were obtained and discussed in length with the patient  Complete review of systems were negative / unremarkable except mentioned above  PHYSICAL EXAM:  Vitals:    12/10/19 1319   BP: 142/70   BP Location: Right arm   Patient Position: Sitting   Cuff Size: Adult   Pulse: 68   Weight: 77 7 kg (171 lb 6 4 oz)   Height: 5' 4" (1 626 m)     Body mass index is 29 42 kg/m²  Physical Exam   Constitutional: She is oriented to person, place, and time   She appears well-developed and well-nourished  HENT:   Head: Normocephalic and atraumatic  Right Ear: External ear normal    Left Ear: External ear normal    Eyes: Pupils are equal, round, and reactive to light  Conjunctivae and EOM are normal    Neck: Neck supple  No JVD present  Cardiovascular: Normal rate and normal heart sounds  Pulmonary/Chest: Effort normal and breath sounds normal  She has no wheezes  She has no rales  Abdominal: Soft  Bowel sounds are normal  She exhibits no distension  There is no tenderness  Musculoskeletal: She exhibits no edema or tenderness  Neurological: She is alert and oriented to person, place, and time  Skin: Skin is warm and dry  No rash noted  Psychiatric: She has a normal mood and affect  Her behavior is normal    Vitals reviewed  PAST MEDICAL HISTORY:  Past Medical History:   Diagnosis Date    A-fib [facility])     Achilles tendinitis     Anemia     Anxiety     Valverde's esophagus     Bursitis of hip     Cardiac arrhythmia     Diastolic CHF (HCC)     Dyspnea     Esophageal ulcer     Fatigue     GERD (gastroesophageal reflux disease)     Hiatal hernia     Hypercholesteremia     Hyperparathyroidism ([address] )     Hypertension     Insomnia     Kidney disease, chronic, stage IV (GFR 15-29 ml/min) (HCC)     Pulmonary hypertension (HCC)     [facility]ick [facility]     Vitamin D deficiency        PAST SURGICAL HISTORY:  Past Surgical History:   Procedure Laterality Date    CARDIAC PACEMAKER PLACEMENT      ESOPHAGOGASTRODUODENOSCOPY N/A 2/10/2017    Procedure: ESOPHAGOGASTRODUODENOSCOPY (EGD); Surgeon: Zee Espinoza MD;  [facility];   Service:     PARAESOPHAGEAL HERNIA REPAIR N/A 4/2/2017    Procedure: REPAIR HERNIA PARAESOPHAGEAL  LAPAROSCOPIC HAND-ASSISTED WITH GASTROPEXY; Repair of umbilical incisional hernia no mesh;  Surgeon: Tiffanie Wooten MD;  [facility];  Service:     LA ESOPHAGOGASTRODUODENOSCOPY TRANSORAL DIAGNOSTIC N/A 4/11/2016    Procedure: ESOPHAGOGASTRODUODENOSCOPY (EGD); Surgeon: Misti Molina MD;  [facility];   Service: Gastroenterology       SOCIAL HISTORY:  Social History     Substance and Sexual Activity   Alcohol Use No    Comment: social     Social History     Substance and Sexual Activity   Drug Use No     Social History     Tobacco Use   Smoking Status Never Smoker   Smokeless Tobacco Never Used       FAMILY HISTORY:  Family History   Problem Relation Age of Onset    No Known Problems Mother     No Known Problems Father     No Known Problems Sister     No Known Problems Brother        MEDICATIONS:    Current Outpatient Medications:     acetaminophen (TYLENOL) 500 mg tablet, Take 500 mg by mouth every 6 (six) hours as needed for mild pain, Disp: , Rfl:     amLODIPine (NORVASC) 5 mg tablet, Take 5 mg by mouth 2 (two) times a day, Disp: , Rfl:     bisacodyl (FLEET) 10 MG/30ML ENEM, Insert 10 mg into the rectum once as needed for constipation, Disp: , Rfl:     CALCIUM CARB-CHOLECALCIFEROL PO, Take 1 tablet by mouth daily, Disp: , Rfl:     cholecalciferol (VITAMIN D3) 1,000 units tablet, Take 1,000 Units by mouth daily, Disp: , Rfl:     cyanocobalamin 1000 MCG tablet, Take 100 mcg by mouth daily, Disp: , Rfl:     docusate sodium (COLACE) 100 mg capsule, Take 100 mg by mouth 2 (two) times a day, Disp: , Rfl:     ferrous sulfate 325 (65 Fe) mg tablet, Take 325 mg by mouth 2 (two) times a day, Disp: , Rfl:     furosemide (LASIX) 20 mg tablet, Take 1 tablet (20 mg total) by mouth 3 (three) times a week for 30 days, Disp: 30 tablet, Rfl: 0    HYDROcodone-acetaminophen (NORCO) 5-325 mg per tablet, Take 0 5 tablets by mouth every 6 (six) hours as needed for pain, Disp: , Rfl:     hydrocortisone (PREPARATION H HYDROCORTISONE) 1 % cream, Apply topically 4 (four) times a day as needed, Disp: , Rfl:     loperamide (IMODIUM A-D) 2 MG tablet, Take 2 mg by mouth 4 (four) times a day as needed for diarrhea, Disp: , Rfl:     Menthol, Topical Analgesic, (COLD & HOT MEDICATED PATCH EX), Apply 1 patch topically as needed, Disp: , Rfl:     metoprolol tartrate (LOPRESSOR) 50 mg tablet, Take 50 mg by mouth 2 (two) times a day , Disp: , Rfl:     miconazole (MICOTIN) 2 % powder, Apply topically as needed for itching, Disp: , Rfl:     Multiple Vitamin (MULTIVITAMIN) tablet, Take 1 tablet by mouth daily  , Disp: , Rfl:     omeprazole (PriLOSEC) 20 mg delayed release capsule, Take 20 mg by mouth daily, Disp: , Rfl:     oxyCODONE-acetaminophen (PERCOCET) 7 5-325 MG per tablet, Take 1 tablet by mouth every 12 (twelve) hours as needed for moderate pain, Disp: , Rfl:     Phenylephrine-Cocoa Butter (PREPARATION H) 0 25-88 44 % SUPP, Insert into the rectum as needed, Disp: , Rfl:     polyethylene glycol (MIRALAX) 17 g packet, Take 17 g by mouth daily, Disp: , Rfl:     rivaroxaban (XARELTO) 10 mg tablet, Take 10 mg by mouth daily, Disp: , Rfl:     senna-docusate sodium (SENOKOT-S) 8 6-50 mg per tablet, Take 1 tablet by mouth as needed for constipation, Disp: , Rfl:     sodium chloride (CHELI 128) 2 % hypertonic ophthalmic solution, Administer 1 drop to the right eye as needed for irritation, Disp: , Rfl:     aspirin 81 MG tablet, Take 81 mg by mouth daily, Disp: , Rfl:     Melatonin-Pyridoxine (MELATIN PO), Take 5 mg by mouth, Disp: , Rfl:     nystatin (MYCOSTATIN) 500,000 units/5 mL suspension, Apply 500,000 Units to the mouth or throat once as needed, Disp: , Rfl:     Lab Results:   Results for orders placed or performed in visit on 22/29/12   Basic metabolic panel   Result Value Ref Range    SODIUM 139     POTASSIUM 5 2     CHLORIDE 110     CO2 25     BUN 42     CREATININE 1 45     Glucose 92     EXTERNAL CALCIUM 8 3     ANION GAP 4     EXTERNAL EGFR 32    Magnesium   Result Value Ref Range    EXTERNAL MAGNESIUM 2 7    Phosphorus   Result Value Ref Range    EXTERNAL PHOSPHORUS 4 1    Vitamin D 25 hydroxy   Result Value Ref Range    EXTERNAL VITAMIN D,25 33    CBC and differential   Result Value Ref Range    WBC 5 7     External RBC 3 30     External Hemoglobin 9 2 g/dL    Hematocrit 29 %    MCV 87     External RDW 16 6     External Platelets 959 K/µL    Abs Neutrophils 4 0     Abs Lymphocytes 0 7     External Abs Monocytes  0 7     External Abs Eosinophils 0 2     External Abs Basophils  0 1    PTH, intact   Result Value Ref Range    PTH 53 8

## 2020-02-24 NOTE — PRE-PROCEDURE INSTRUCTIONS
My Surgical Experience    The following information was developed to assist you to prepare for your operation  What do I need to do before coming to the hospital?   Arrange for a responsible person to drive you to and from the hospital    Arrange care for your children at home  Children are not allowed in the recovery areas of the hospital   Plan to wear clothing that is easy to put on and take off  If you are having shoulder surgery, wear a shirt that buttons or zippers in the front  Bathing  o Shower the evening before and the morning of your surgery with an antibacterial soap  Please refer to the Pre Op Showering Instructions for Surgery Patients Sheet   o Remove nail polish and all body piercing jewelry  o Do not shave any body part for at least 24 hours before surgery-this includes face, arms, legs and upper body  Food  o Nothing to eat or drink after midnight the night before your surgery  This includes candy and chewing gum  o Exception: If your surgery is after 12:00pm (noon), you may have clear liquids such as 7-Up®, ginger ale, apple or cranberry juice, Jell-O®, water, or clear broth until 8:00 am  o Do not drink milk or juice with pulp on the morning before surgery  o Do not drink alcohol 24 hours before surgery  Medicine  o Follow instructions you received from your surgeon about which medicines you may take on the day of surgery  o If instructed to take medicine on the morning of surgery, take pills with just a small sip of water  Call your prescribing doctor for specific infroamtion on what to do if you take insulin    What should I bring to the hospital?    Bring:  Orly Grey or a walker, if you have them, for foot or knee surgery   A list of the daily medicines, vitamins, minerals, herbals and nutritional supplements you take   Include the dosages of medicines and the time you take them each day   Glasses, dentures or hearing aids   Minimal clothing; you will be wearing hospital sleepwear   Photo ID; required to verify your identity   If you have a Living Will or Power of , bring a copy of the documents   If you have an ostomy, bring an extra pouch and any supplies you use    Do not bring   Medicines or inhalers   Money, valuables or jewelry    What other information should I know about the day of surgery?  Notify your surgeons if you develop a cold, sore throat, cough, fever, rash or any other illness   Report to the Ambulatory Surgical/Same Day Surgery Unit   You will be instructed to stop at Registration only if you have not been pre-registered   Inform your  fi they do not stay that they will be asked by the staff to leave a phone number where they can be reached   Be available to be reached before surgery  In the event the operating room schedule changes, you may be asked to come in earlier or later than expected    *It is important to tell your doctor and others involved in your health care if you are taking or have been taking any non-prescription drugs, vitamins, minerals, herbals or other nutritional supplements  Any of these may interact with some food or medicines and cause a reaction      Pre-Surgery Instructions:   Medication Instructions    acetaminophen (TYLENOL) 500 mg tablet Instructed patient per Anesthesia Guidelines   amLODIPine (NORVASC) 5 mg tablet Instructed patient per Anesthesia Guidelines   bisacodyl (FLEET) 10 MG/30ML ENEM Instructed patient per Anesthesia Guidelines   CALCIUM CARB-CHOLECALCIFEROL PO Instructed patient per Anesthesia Guidelines   cholecalciferol (VITAMIN D3) 1,000 units tablet Instructed patient per Anesthesia Guidelines   cyanocobalamin 1000 MCG tablet Instructed patient per Anesthesia Guidelines   docusate sodium (COLACE) 100 mg capsule Instructed patient per Anesthesia Guidelines   ferrous sulfate 325 (65 Fe) mg tablet Instructed patient per Anesthesia Guidelines      furosemide (LASIX) 20 mg tablet Instructed patient per Anesthesia Guidelines   hydrocortisone (PREPARATION H HYDROCORTISONE) 1 % cream Instructed patient per Anesthesia Guidelines   loperamide (IMODIUM A-D) 2 MG tablet Instructed patient per Anesthesia Guidelines   Melatonin-Pyridoxine (MELATIN PO) Instructed patient per Anesthesia Guidelines   Menthol, Topical Analgesic, (COLD & HOT MEDICATED PATCH EX) Instructed patient per Anesthesia Guidelines   metoprolol tartrate (LOPRESSOR) 50 mg tablet Instructed patient per Anesthesia Guidelines   miconazole (MICOTIN) 2 % powder Instructed patient per Anesthesia Guidelines   Multiple Vitamin (MULTIVITAMIN) tablet Instructed patient per Anesthesia Guidelines   nystatin (MYCOSTATIN) 500,000 units/5 mL suspension Instructed patient per Anesthesia Guidelines   omeprazole (PriLOSEC) 20 mg delayed release capsule Instructed patient per Anesthesia Guidelines   oxyCODONE-acetaminophen (PERCOCET) 7 5-325 MG per tablet Instructed patient per Anesthesia Guidelines   Phenylephrine-Cocoa Butter (PREPARATION H) 0 25-88 44 % SUPP Instructed patient per Anesthesia Guidelines   sodium chloride (CHELI 128) 2 % hypertonic ophthalmic solution Instructed patient per Anesthesia Guidelines  To take amlodipine, metoprolol, and omeprazole a m  Of surgery

## 2020-02-25 ENCOUNTER — ANESTHESIA EVENT (OUTPATIENT)
Dept: PERIOP | Facility: HOSPITAL | Age: 85
End: 2020-02-25
Payer: OTHER MISCELLANEOUS

## 2020-02-25 NOTE — ANESTHESIA PREPROCEDURE EVALUATION
Review of Systems/Medical History  Patient summary reviewed  Chart reviewed  No history of anesthetic complications     Cardiovascular  Pacemaker/AICD, Hyperlipidemia, Hypertension , Dysrhythmias , atrial fibrillation, CHF ,   Comment: SUMMARY   2017  LEFT VENTRICLE:  Systolic function was at the lower limits of normal  Ejection fraction was estimated to be 50 %  There was severe hypokinesis to akinesis of the mid anteroseptal, apical inferior, apical septal, and apical wall(s)  There was mild concentric hypertrophy  Features were consistent with a pseudonormal left ventricular filling pattern, with concomitant abnormal relaxation and increased filling pressure (grade 2 diastolic dysfunction)      RIGHT VENTRICLE:  The size was at the upper limits of normal   Systolic function was normal      LEFT ATRIUM:  The atrium was mildly dilated      RIGHT ATRIUM:  The atrium was moderately dilated      MITRAL VALVE:  There was mild annular calcification  There was mild to moderate regurgitation      AORTIC VALVE:  There was trace regurgitation      TRICUSPID VALVE:  There was moderate to severe regurgitation  Pulmonary artery systolic pressure was mildly increased  , Pulmonary hypertension Pulmonary       GI/Hepatic    PUD, GERD ,  Hiatal hernia,   Comment: Paraesophageal hernia with obstruction but no gangrene     Chronic kidney disease stage 3,   Comment: Secondary hyperparathyroidism ([address] )           Endo/Other     GYN       Hematology  Anemia ,     Musculoskeletal       Neurology   Psychology   Anxiety,              Physical Exam    Airway    Mallampati score: II  TM Distance: >3 FB  Neck ROM: full     Dental   No notable dental hx     Cardiovascular  Cardiovascular exam normal    Pulmonary  Pulmonary exam normal     Other Findings        Anesthesia Plan  ASA Score- 3     Anesthesia Type- general with ASA Monitors  Additional Monitors:   Airway Plan: ETT  Comment: Patient a little confused this morning  Called her POA (daughter)  She said her dementia waxes and wanes  Obtained consent from her        Plan Factors-    Induction- intravenous  Postoperative Plan- Plan for postoperative opioid use  Informed Consent- Anesthetic plan and risks discussed with patient and healthcare power of   I personally reviewed this patient with the CRNA  Discussed and agreed on the Anesthesia Plan with the CRNA  Cuca Watson

## 2020-02-26 ENCOUNTER — APPOINTMENT (OUTPATIENT)
Dept: RADIOLOGY | Facility: HOSPITAL | Age: 85
End: 2020-02-26
Payer: OTHER MISCELLANEOUS

## 2020-02-26 ENCOUNTER — ANESTHESIA (OUTPATIENT)
Dept: PERIOP | Facility: HOSPITAL | Age: 85
End: 2020-02-26
Payer: OTHER MISCELLANEOUS

## 2020-02-26 ENCOUNTER — HOSPITAL ENCOUNTER (OUTPATIENT)
Facility: HOSPITAL | Age: 85
Setting detail: OBSERVATION
Discharge: HOME | End: 2020-02-27
Attending: SPECIALIST | Admitting: SPECIALIST
Payer: OTHER MISCELLANEOUS

## 2020-02-26 DIAGNOSIS — I48.20 CHRONIC ATRIAL FIBRILLATION (HCC): Primary | Chronic | ICD-10-CM

## 2020-02-26 DIAGNOSIS — C67.9 MALIGNANT NEOPLASM OF BLADDER, UNSPECIFIED (HCC): ICD-10-CM

## 2020-02-26 PROBLEM — N32.89 BLADDER MASS: Status: ACTIVE | Noted: 2020-02-26

## 2020-02-26 LAB
ABO GROUP BLD: NORMAL
ANION GAP SERPL CALCULATED.3IONS-SCNC: 6 MMOL/L (ref 4–13)
B-HCG SERPL-ACNC: <2 MIU/ML
BASOPHILS # BLD AUTO: 0.04 THOUSANDS/ΜL (ref 0–0.1)
BASOPHILS NFR BLD AUTO: 0 % (ref 0–1)
BLD GP AB SCN SERPL QL: NEGATIVE
BUN SERPL-MCNC: 43 MG/DL (ref 5–25)
CALCIUM SERPL-MCNC: 8 MG/DL (ref 8.3–10.1)
CHLORIDE SERPL-SCNC: 99 MMOL/L (ref 100–108)
CO2 SERPL-SCNC: 30 MMOL/L (ref 21–32)
CREAT SERPL-MCNC: 1.63 MG/DL (ref 0.6–1.3)
EOSINOPHIL # BLD AUTO: 0.09 THOUSAND/ΜL (ref 0–0.61)
EOSINOPHIL NFR BLD AUTO: 1 % (ref 0–6)
ERYTHROCYTE [DISTWIDTH] IN BLOOD BY AUTOMATED COUNT: 14.8 % (ref 11.6–15.1)
GFR SERPL CREATININE-BSD FRML MDRD: 27 ML/MIN/1.73SQ M
GLUCOSE P FAST SERPL-MCNC: 108 MG/DL (ref 65–99)
GLUCOSE SERPL-MCNC: 108 MG/DL (ref 65–140)
HCT VFR BLD AUTO: 24.5 % (ref 34.8–46.1)
HCT VFR BLD AUTO: 24.7 % (ref 34.8–46.1)
HCT VFR BLD AUTO: 27.1 % (ref 34.8–46.1)
HGB BLD-MCNC: 7.5 G/DL (ref 11.5–15.4)
HGB BLD-MCNC: 7.6 G/DL (ref 11.5–15.4)
HGB BLD-MCNC: 8.2 G/DL (ref 11.5–15.4)
IMM GRANULOCYTES # BLD AUTO: 0.13 THOUSAND/UL (ref 0–0.2)
IMM GRANULOCYTES NFR BLD AUTO: 1 % (ref 0–2)
LYMPHOCYTES # BLD AUTO: 1 THOUSANDS/ΜL (ref 0.6–4.47)
LYMPHOCYTES NFR BLD AUTO: 11 % (ref 14–44)
MCH RBC QN AUTO: 27.8 PG (ref 26.8–34.3)
MCHC RBC AUTO-ENTMCNC: 31 G/DL (ref 31.4–37.4)
MCV RBC AUTO: 90 FL (ref 82–98)
MONOCYTES # BLD AUTO: 0.75 THOUSAND/ΜL (ref 0.17–1.22)
MONOCYTES NFR BLD AUTO: 8 % (ref 4–12)
NEUTROPHILS # BLD AUTO: 7.27 THOUSANDS/ΜL (ref 1.85–7.62)
NEUTS SEG NFR BLD AUTO: 79 % (ref 43–75)
NRBC BLD AUTO-RTO: 0 /100 WBCS
PLATELET # BLD AUTO: 230 THOUSANDS/UL (ref 149–390)
PMV BLD AUTO: 9.4 FL (ref 8.9–12.7)
POTASSIUM SERPL-SCNC: 3.9 MMOL/L (ref 3.5–5.3)
RBC # BLD AUTO: 2.73 MILLION/UL (ref 3.81–5.12)
RH BLD: POSITIVE
SODIUM SERPL-SCNC: 135 MMOL/L (ref 136–145)
SPECIMEN EXPIRATION DATE: NORMAL
WBC # BLD AUTO: 9.28 THOUSAND/UL (ref 4.31–10.16)

## 2020-02-26 PROCEDURE — 86900 BLOOD TYPING SEROLOGIC ABO: CPT | Performed by: INTERNAL MEDICINE

## 2020-02-26 PROCEDURE — 87077 CULTURE AEROBIC IDENTIFY: CPT | Performed by: SPECIALIST

## 2020-02-26 PROCEDURE — 85018 HEMOGLOBIN: CPT | Performed by: INTERNAL MEDICINE

## 2020-02-26 PROCEDURE — 88307 TISSUE EXAM BY PATHOLOGIST: CPT | Performed by: PATHOLOGY

## 2020-02-26 PROCEDURE — 99214 OFFICE O/P EST MOD 30 MIN: CPT | Performed by: INTERNAL MEDICINE

## 2020-02-26 PROCEDURE — 87086 URINE CULTURE/COLONY COUNT: CPT | Performed by: SPECIALIST

## 2020-02-26 PROCEDURE — 84702 CHORIONIC GONADOTROPIN TEST: CPT | Performed by: SPECIALIST

## 2020-02-26 PROCEDURE — 85014 HEMATOCRIT: CPT | Performed by: INTERNAL MEDICINE

## 2020-02-26 PROCEDURE — 85025 COMPLETE CBC W/AUTO DIFF WBC: CPT | Performed by: SPECIALIST

## 2020-02-26 PROCEDURE — 74420 UROGRAPHY RTRGR +-KUB: CPT

## 2020-02-26 PROCEDURE — 86920 COMPATIBILITY TEST SPIN: CPT

## 2020-02-26 PROCEDURE — G0379 DIRECT REFER HOSPITAL OBSERV: HCPCS

## 2020-02-26 PROCEDURE — 86850 RBC ANTIBODY SCREEN: CPT | Performed by: INTERNAL MEDICINE

## 2020-02-26 PROCEDURE — 86901 BLOOD TYPING SEROLOGIC RH(D): CPT | Performed by: INTERNAL MEDICINE

## 2020-02-26 PROCEDURE — 80048 BASIC METABOLIC PNL TOTAL CA: CPT | Performed by: SPECIALIST

## 2020-02-26 PROCEDURE — 87186 SC STD MICRODIL/AGAR DIL: CPT | Performed by: SPECIALIST

## 2020-02-26 PROCEDURE — P9016 RBC LEUKOCYTES REDUCED: HCPCS

## 2020-02-26 RX ORDER — LEVOFLOXACIN 5 MG/ML
500 INJECTION, SOLUTION INTRAVENOUS ONCE
Status: COMPLETED | OUTPATIENT
Start: 2020-02-26 | End: 2020-02-26

## 2020-02-26 RX ORDER — LIDOCAINE HYDROCHLORIDE 10 MG/ML
INJECTION, SOLUTION EPIDURAL; INFILTRATION; INTRACAUDAL; PERINEURAL AS NEEDED
Status: DISCONTINUED | OUTPATIENT
Start: 2020-02-26 | End: 2020-02-26 | Stop reason: SURG

## 2020-02-26 RX ORDER — CEFTRIAXONE 1 G/50ML
1000 INJECTION, SOLUTION INTRAVENOUS EVERY 24 HOURS
Status: DISCONTINUED | OUTPATIENT
Start: 2020-02-27 | End: 2020-02-27 | Stop reason: HOSPADM

## 2020-02-26 RX ORDER — ACETAMINOPHEN 325 MG/1
650 TABLET ORAL ONCE
Status: COMPLETED | OUTPATIENT
Start: 2020-02-26 | End: 2020-02-26

## 2020-02-26 RX ORDER — OXYCODONE HYDROCHLORIDE AND ACETAMINOPHEN 5; 325 MG/1; MG/1
1 TABLET ORAL EVERY 6 HOURS PRN
Status: DISCONTINUED | OUTPATIENT
Start: 2020-02-26 | End: 2020-02-27 | Stop reason: HOSPADM

## 2020-02-26 RX ORDER — ASPIRIN 81 MG/1
81 TABLET, CHEWABLE ORAL DAILY
Status: DISCONTINUED | OUTPATIENT
Start: 2020-02-26 | End: 2020-02-27 | Stop reason: HOSPADM

## 2020-02-26 RX ORDER — DIPHENHYDRAMINE HCL 25 MG
12.5 TABLET ORAL ONCE
Status: COMPLETED | OUTPATIENT
Start: 2020-02-26 | End: 2020-02-26

## 2020-02-26 RX ORDER — GLYCINE 1.5 G/100ML
SOLUTION IRRIGATION AS NEEDED
Status: DISCONTINUED | OUTPATIENT
Start: 2020-02-26 | End: 2020-02-26 | Stop reason: HOSPADM

## 2020-02-26 RX ORDER — MAGNESIUM HYDROXIDE/ALUMINUM HYDROXICE/SIMETHICONE 120; 1200; 1200 MG/30ML; MG/30ML; MG/30ML
30 SUSPENSION ORAL EVERY 6 HOURS PRN
Status: DISCONTINUED | OUTPATIENT
Start: 2020-02-26 | End: 2020-02-27 | Stop reason: HOSPADM

## 2020-02-26 RX ORDER — METOPROLOL TARTRATE 50 MG/1
50 TABLET, FILM COATED ORAL EVERY 12 HOURS SCHEDULED
Status: DISCONTINUED | OUTPATIENT
Start: 2020-02-27 | End: 2020-02-27 | Stop reason: HOSPADM

## 2020-02-26 RX ORDER — FENTANYL CITRATE/PF 50 MCG/ML
25 SYRINGE (ML) INJECTION
Status: DISCONTINUED | OUTPATIENT
Start: 2020-02-26 | End: 2020-02-26 | Stop reason: HOSPADM

## 2020-02-26 RX ORDER — DOCUSATE SODIUM 100 MG/1
100 CAPSULE, LIQUID FILLED ORAL 2 TIMES DAILY
Status: DISCONTINUED | OUTPATIENT
Start: 2020-02-26 | End: 2020-02-27 | Stop reason: HOSPADM

## 2020-02-26 RX ORDER — SODIUM CHLORIDE, SODIUM LACTATE, POTASSIUM CHLORIDE, CALCIUM CHLORIDE 600; 310; 30; 20 MG/100ML; MG/100ML; MG/100ML; MG/100ML
125 INJECTION, SOLUTION INTRAVENOUS CONTINUOUS
Status: DISCONTINUED | OUTPATIENT
Start: 2020-02-26 | End: 2020-02-26

## 2020-02-26 RX ORDER — ACETAMINOPHEN 325 MG/1
500 TABLET ORAL EVERY 6 HOURS PRN
Status: DISCONTINUED | OUTPATIENT
Start: 2020-02-26 | End: 2020-02-27 | Stop reason: HOSPADM

## 2020-02-26 RX ORDER — ONDANSETRON 2 MG/ML
INJECTION INTRAMUSCULAR; INTRAVENOUS AS NEEDED
Status: DISCONTINUED | OUTPATIENT
Start: 2020-02-26 | End: 2020-02-26 | Stop reason: SURG

## 2020-02-26 RX ORDER — POLYETHYLENE GLYCOL 3350 17 G/17G
17 POWDER, FOR SOLUTION ORAL DAILY
Status: DISCONTINUED | OUTPATIENT
Start: 2020-02-26 | End: 2020-02-27 | Stop reason: HOSPADM

## 2020-02-26 RX ORDER — ONDANSETRON 2 MG/ML
4 INJECTION INTRAMUSCULAR; INTRAVENOUS EVERY 6 HOURS PRN
Status: DISCONTINUED | OUTPATIENT
Start: 2020-02-26 | End: 2020-02-27 | Stop reason: HOSPADM

## 2020-02-26 RX ORDER — LEVOFLOXACIN 5 MG/ML
500 INJECTION, SOLUTION INTRAVENOUS EVERY 24 HOURS
Status: DISCONTINUED | OUTPATIENT
Start: 2020-02-27 | End: 2020-02-26

## 2020-02-26 RX ORDER — MELATONIN
1000 DAILY
Status: DISCONTINUED | OUTPATIENT
Start: 2020-02-26 | End: 2020-02-27 | Stop reason: HOSPADM

## 2020-02-26 RX ORDER — SODIUM CHLORIDE, SODIUM LACTATE, POTASSIUM CHLORIDE, CALCIUM CHLORIDE 600; 310; 30; 20 MG/100ML; MG/100ML; MG/100ML; MG/100ML
75 INJECTION, SOLUTION INTRAVENOUS CONTINUOUS
Status: DISCONTINUED | OUTPATIENT
Start: 2020-02-26 | End: 2020-02-26

## 2020-02-26 RX ORDER — PROPOFOL 10 MG/ML
INJECTION, EMULSION INTRAVENOUS AS NEEDED
Status: DISCONTINUED | OUTPATIENT
Start: 2020-02-26 | End: 2020-02-26 | Stop reason: SURG

## 2020-02-26 RX ORDER — FENTANYL CITRATE 50 UG/ML
INJECTION, SOLUTION INTRAMUSCULAR; INTRAVENOUS AS NEEDED
Status: DISCONTINUED | OUTPATIENT
Start: 2020-02-26 | End: 2020-02-26 | Stop reason: SURG

## 2020-02-26 RX ORDER — FERROUS SULFATE 325(65) MG
325 TABLET ORAL 2 TIMES DAILY
Status: DISCONTINUED | OUTPATIENT
Start: 2020-02-26 | End: 2020-02-27 | Stop reason: HOSPADM

## 2020-02-26 RX ORDER — PANTOPRAZOLE SODIUM 20 MG/1
20 TABLET, DELAYED RELEASE ORAL
Status: DISCONTINUED | OUTPATIENT
Start: 2020-02-27 | End: 2020-02-27 | Stop reason: HOSPADM

## 2020-02-26 RX ADMIN — ONDANSETRON 4 MG: 2 INJECTION INTRAMUSCULAR; INTRAVENOUS at 10:35

## 2020-02-26 RX ADMIN — ASPIRIN 81 MG 81 MG: 81 TABLET ORAL at 15:15

## 2020-02-26 RX ADMIN — FENTANYL CITRATE 50 MCG: 50 INJECTION, SOLUTION INTRAMUSCULAR; INTRAVENOUS at 10:08

## 2020-02-26 RX ADMIN — PROPOFOL 100 MG: 10 INJECTION, EMULSION INTRAVENOUS at 10:08

## 2020-02-26 RX ADMIN — DIPHENHYDRAMINE HCL 12.5 MG: 25 TABLET, COATED ORAL at 22:49

## 2020-02-26 RX ADMIN — SODIUM CHLORIDE, SODIUM LACTATE, POTASSIUM CHLORIDE, AND CALCIUM CHLORIDE: .6; .31; .03; .02 INJECTION, SOLUTION INTRAVENOUS at 11:50

## 2020-02-26 RX ADMIN — PHENYLEPHRINE HYDROCHLORIDE 100 MCG: 10 INJECTION INTRAVENOUS at 11:21

## 2020-02-26 RX ADMIN — VITAMIN D, TAB 1000IU (100/BT) 1000 UNITS: 25 TAB at 15:15

## 2020-02-26 RX ADMIN — LIDOCAINE HYDROCHLORIDE 50 MG: 10 INJECTION, SOLUTION EPIDURAL; INFILTRATION; INTRACAUDAL; PERINEURAL at 10:08

## 2020-02-26 RX ADMIN — SODIUM CHLORIDE, SODIUM LACTATE, POTASSIUM CHLORIDE, AND CALCIUM CHLORIDE 125 ML/HR: .6; .31; .03; .02 INJECTION, SOLUTION INTRAVENOUS at 08:05

## 2020-02-26 RX ADMIN — ACETAMINOPHEN 650 MG: 325 TABLET, FILM COATED ORAL at 22:46

## 2020-02-26 RX ADMIN — PHENYLEPHRINE HYDROCHLORIDE 100 MCG: 10 INJECTION INTRAVENOUS at 11:19

## 2020-02-26 RX ADMIN — PROPOFOL 50 MG: 10 INJECTION, EMULSION INTRAVENOUS at 10:16

## 2020-02-26 RX ADMIN — LEVOFLOXACIN: 5 INJECTION, SOLUTION INTRAVENOUS at 09:50

## 2020-02-26 RX ADMIN — FERROUS SULFATE TAB 325 MG (65 MG ELEMENTAL FE) 325 MG: 325 (65 FE) TAB at 15:14

## 2020-02-26 RX ADMIN — DOCUSATE SODIUM 100 MG: 100 CAPSULE, LIQUID FILLED ORAL at 17:48

## 2020-02-26 RX ADMIN — FENTANYL CITRATE 25 MCG: 50 INJECTION, SOLUTION INTRAMUSCULAR; INTRAVENOUS at 12:22

## 2020-02-26 RX ADMIN — FENTANYL CITRATE 50 MCG: 50 INJECTION, SOLUTION INTRAMUSCULAR; INTRAVENOUS at 10:20

## 2020-02-26 RX ADMIN — PROPOFOL 50 MG: 10 INJECTION, EMULSION INTRAVENOUS at 11:06

## 2020-02-26 RX ADMIN — PROPOFOL 50 MG: 10 INJECTION, EMULSION INTRAVENOUS at 10:32

## 2020-02-26 RX ADMIN — OXYCODONE HYDROCHLORIDE AND ACETAMINOPHEN 1 TABLET: 5; 325 TABLET ORAL at 21:27

## 2020-02-26 RX ADMIN — PROPOFOL 50 MG: 10 INJECTION, EMULSION INTRAVENOUS at 10:23

## 2020-02-26 NOTE — ANESTHESIA POSTPROCEDURE EVALUATION
Post-Op Assessment Note    CV Status:  Stable  Pain Score: 0    Pain management: adequate     Mental Status:  Alert and awake   Hydration Status:  Stable   PONV Controlled:  Controlled   Airway Patency:  Patent and adequate   Post Op Vitals Reviewed: Yes      Staff: CRNA           BP      Temp      Pulse     Resp      SpO2

## 2020-02-26 NOTE — H&P
H&P Exam - Urology       Patient: Marianne Alicia   : 1928 Sex: female   MRN: [id number]     CSN: [id number]      History of Present Illness   HPI:  Marianne Alicia is a 80 y o  female who presents with history of gross hematuria on chronic Xarelto apparently bleeding for close to a year in the nursing home requiring admission to [facility] a few weeks ago requiring ICU care with 4 units of blood transfused found to have large bladder tumor extending across the entire right lateral and posterior wall undergoing aggressive fulguration and some resection in light of equipment availability and size of tumor  Patient's biopsy returned for high-grade superficial transitional cell carcinoma at this time patient to undergo repeat TURBT hopefully to remove the entire tumor  Patient and daughter aware of the risk of infection bleeding anesthesia additional urologic procedures        Review of Systems:   Constitutional:  Negative for activity change, fever, chills and diaphoresis  HENT: Negative for hearing loss and trouble swallowing  Eyes: Negative for itching and visual disturbance  Respiratory: Negative for chest tightness and shortness of breath  Cardiovascular: Negative for chest pain, edema  Gastrointestinal: Negative for abdominal distention, na abdominal pain, constipation, diarrhea, Nausea and vomiting  Genitourinary: Negative for decreased urine volume, difficulty urinating, dysuria, enuresis, frequency, hematuria and urgency  Musculoskeletal: Negative for gait problem and myalgias  Neurological: Negative for dizziness and headaches  Hematological: Does not bruise/bleed easily         Historical Information   Past Medical History:   Diagnosis Date    A-fib [facility])     Achilles tendinitis     Anemia     Anxiety     Valverde's esophagus     Bursitis of hip     Cardiac arrhythmia     Diastolic CHF (HCC)     Dyspnea     Esophageal ulcer     Fatigue     GERD (gastroesophageal reflux disease)     Hiatal hernia     Hypercholesteremia     Hyperparathyroidism ([address] )     Hypertension     Insomnia     Kidney disease, chronic, stage IV (GFR 15-29 ml/min) (HCC)     Pulmonary hypertension (HCC)     SSS (sick sinus syndrome) (HCC)     Vitamin D deficiency      Past Surgical History:   Procedure Laterality Date    CARDIAC PACEMAKER PLACEMENT      ESOPHAGOGASTRODUODENOSCOPY N/A 2/10/2017    Procedure: ESOPHAGOGASTRODUODENOSCOPY (EGD); Surgeon: Michelle Nogueira MD;  [facility]: [facility]; Service:     PARAESOPHAGEAL HERNIA REPAIR N/A 4/2/2017    Procedure: REPAIR HERNIA PARAESOPHAGEAL  LAPAROSCOPIC HAND-ASSISTED WITH GASTROPEXY; Repair of umbilical incisional hernia no mesh;  Surgeon: Dina Arcos MD;  Location: [facility];  Service:     ND ESOPHAGOGASTRODUODENOSCOPY TRANSORAL DIAGNOSTIC N/A 4/11/2016    Procedure: ESOPHAGOGASTRODUODENOSCOPY (EGD); Surgeon: Antonio Wilson MD;  Location: [facility];   Service: Gastroenterology     Social History   Social History     Substance and Sexual Activity   Alcohol Use No    Comment: social     Social History     Substance and Sexual Activity   Drug Use No     Social History     Tobacco Use   Smoking Status Never Smoker   Smokeless Tobacco Never Used     Family History:   Family History   Problem Relation Age of Onset    No Known Problems Mother     No Known Problems Father     No Known Problems Sister     No Known Problems Brother        Meds/Allergies   Medications Prior to Admission   Medication    amLODIPine (NORVASC) 5 mg tablet    CALCIUM CARB-CHOLECALCIFEROL PO    cholecalciferol (VITAMIN D3) 1,000 units tablet    cyanocobalamin 1000 MCG tablet    docusate sodium (COLACE) 100 mg capsule    ferrous sulfate 325 (65 Fe) mg tablet    furosemide (LASIX) 20 mg tablet    HYDROcodone-acetaminophen (NORCO) 5-325 mg per tablet    loperamide (IMODIUM A-D) 2 MG tablet    Melatonin-Pyridoxine (MELATIN PO)    Menthol, Topical Analgesic, (COLD & HOT MEDICATED PATCH EX)    metoprolol tartrate (LOPRESSOR) 50 mg tablet    miconazole (MICOTIN) 2 % powder    Multiple Vitamin (MULTIVITAMIN) tablet    nystatin (MYCOSTATIN) 500,000 units/5 mL suspension    omeprazole (PriLOSEC) 20 mg delayed release capsule    oxyCODONE-acetaminophen (PERCOCET) 7 5-325 MG per tablet    Phenylephrine-Cocoa Butter (PREPARATION H) 0 25-88 44 % SUPP    polyethylene glycol (MIRALAX) 17 g packet    senna-docusate sodium (SENOKOT-S) 8 6-50 mg per tablet    sodium chloride (CHELI 128) 2 % hypertonic ophthalmic solution    acetaminophen (TYLENOL) 500 mg tablet    aspirin 81 MG tablet    bisacodyl (FLEET) 10 MG/30ML ENEM    hydrocortisone (PREPARATION H HYDROCORTISONE) 1 % cream    rivaroxaban (XARELTO) 10 mg tablet     Allergies   Allergen Reactions    Amoxicillin     Penicillins Hives       Objective   Vitals: /58   Pulse 64   Temp 98 2 °F (36 8 °C) (Oral)   Resp 18   Ht 5' 4" (1 626 m)   Wt 77 6 kg (171 lb)   SpO2 97%   BMI 29 35 kg/m²     Physical Exam:  General Alert awake   Normocephalic atraumatic PERRLA  Lungs clear bilaterally  Cardiac normal S1 normal S2  Abdomen soft, flank pain  Extremities no edema    No intake/output data recorded      Invasive Devices     Peripheral Intravenous Line            Peripheral IV 02/26/20 Right Forearm less than 1 day                    Lab Results: CBC:   Lab Results   Component Value Date    WBC 5 7 12/02/2019    HGB 9 2 12/02/2019    HCT 29 12/02/2019    MCV 87 12/02/2019     12/02/2019    MCH 27 9 07/06/2019    MCHC 32 4 07/06/2019    RDW 16 6 12/02/2019    MPV 7 6 (A) 07/06/2019     CMP:   Lab Results   Component Value Date     (L) 07/03/2017     12/02/2019     07/09/2017     07/03/2017    CO2 25 12/02/2019    CO2 26 07/09/2017    CO2 22 07/03/2017    ANIONGAP 7 11/16/2015    BUN 42 12/02/2019    BUN 33 (H) 07/09/2017    BUN 31 (H) 07/03/2017    CREATININE 1 45 12/02/2019    CREATININE 1 50 (H) 07/09/2017    CREATININE 1 37 (H) 07/03/2017    GLUCOSE 101 11/16/2015    CALCIUM 8 3 12/02/2019    CALCIUM 8 2 (L) 07/09/2017    CALCIUM 8 3 (L) 07/03/2017    AST 15 07/03/2017    ALT 12 07/03/2017    ALKPHOS 112 07/03/2017    PROT 6 3 07/03/2017    BILITOT 1 4 (H) 07/03/2017    EGFR 32 12/02/2019    EGFR 32 7 07/09/2017     Urinalysis:   Lab Results   Component Value Date    COLORU Yellow 11/11/2015    CLARITYU Clear 11/11/2015    SPECGRAV 1 016 07/06/2019    SPECGRAV 1 020 11/11/2015    PHUR 5 0 07/06/2019    PHUR 5 5 11/11/2015    LEUKOCYTESUR Moderate (A) 07/06/2019    LEUKOCYTESUR Negative 11/11/2015    NITRITE Negative 07/06/2019    NITRITE Negative 11/11/2015    PROTEINUA Negative 11/11/2015    GLUCOSEU Negative 07/06/2019    GLUCOSEU Negative 11/11/2015    KETONESU Negative 07/06/2019    KETONESU Trace (A) 11/11/2015    BILIRUBINUR Negative 11/11/2015    BLOODU >=1000 (1%) 07/06/2019    BLOODU Negative 11/11/2015     Urine Culture: No results found for: URINECX  PSA: No results found for: PSA        Assessment/ Plan:  Cysto TURBT evacuation of organized clots      Felicia Tabor MD

## 2020-02-26 NOTE — CONSULTS
Inpatient Medical Consultation - [facility]    Patient Information: Ronel Cadena 80 y o  female MRN: [id number]  Unit/Bed#: [address] [place] [address] Encounter: [id number]    PCP: Francois Guillory MD  Date of Admission:  2/26/2020  Date of Consultation: 02/26/20  Requesting Physician: Linda Ross MD    Reason For Consultation:     Medical management, evaluation for blood transfusion    History of Present Illness:    Ronel Cadena is a 80 y o  female with history of AFib on anticoagulation, status post pacemaker, CHF, CKD, hypertension , GERD, dyslipidemia, recent hospitalization for gross hematuria when she was found to have large bladder tumor status post biopsy who is originally admitted to the urology service on 2/26/2020 postoperatively after repeat TURBT for monitoring of hematuria and possible need of blood transfusion  We are consulted for medical management  Patient was seen and examined, currently lying comfortably in bed watching TV  Appears forgetful and poor historian, initially she reports that she did not know why she was in the hospital but after reminding she remembered that she is the hospital for procedure for bladder  Patient currently denies any chest pain shortness of breath abdominal pain, suprapubic or flank pain  Connelly catheter in place which is draining pink-tinged urine  Review of Systems:    Review of Systems   Constitutional: Negative for chills and fever  HENT: Negative for rhinorrhea  Eyes: Negative for visual disturbance  Respiratory: Negative for cough and shortness of breath  Cardiovascular: Negative for chest pain  Gastrointestinal: Negative for abdominal pain, nausea and vomiting  Genitourinary: Positive for hematuria  Neurological: Negative for dizziness         Past Medical and Surgical History:     Past Medical History:   Diagnosis Date    A-fib [facility])     Achilles tendinitis     Anemia     Anxiety     Valverde's esophagus     Bursitis of hip     Cardiac arrhythmia     Diastolic CHF (HCC)     Dyspnea     Esophageal ulcer     Fatigue     GERD (gastroesophageal reflux disease)     Hiatal hernia     Hypercholesteremia     Hyperparathyroidism ([address] )     Hypertension     Insomnia     Kidney disease, chronic, stage IV (GFR 15-29 ml/min) (HCC)     Pulmonary hypertension (HCC)     SSS (sick sinus syndrome) ([facility])     Vitamin D deficiency        Past Surgical History:   Procedure Laterality Date    CARDIAC PACEMAKER PLACEMENT      ESOPHAGOGASTRODUODENOSCOPY N/A 2/10/2017    Procedure: ESOPHAGOGASTRODUODENOSCOPY (EGD); Surgeon: Aviva Quevedo MD;  [facility]; Service:     PARAESOPHAGEAL HERNIA REPAIR N/A 4/2/2017    Procedure: REPAIR HERNIA PARAESOPHAGEAL  LAPAROSCOPIC HAND-ASSISTED WITH GASTROPEXY; Repair of umbilical incisional hernia no mesh;  Surgeon: Evelin Perez MD;  [facility];  Service:     ME ESOPHAGOGASTRODUODENOSCOPY TRANSORAL DIAGNOSTIC N/A 4/11/2016    Procedure: ESOPHAGOGASTRODUODENOSCOPY (EGD); Surgeon: Misti Molina MD;  [facility]; Service: Gastroenterology       Meds/Allergies:    PTA meds:   Prior to Admission Medications   Prescriptions Last Dose Informant Patient Reported? Taking?    Melatonin-Pyridoxine (MELATIN PO) 2/25/2020 at 2200 Outside Facility (Specify) Yes Yes   Sig: Take 5 mg by mouth   Phenylephrine-Cocoa Butter (PREPARATION H) 0 25-88 44 % SUPP 2/26/2020 Outside Facility (Specify) Yes Yes   Sig: Insert into the rectum as needed   acetaminophen (TYLENOL) 500 mg tablet More than a month at Unknown time Outside Facility (Specify) Yes No   Sig: Take 500 mg by mouth every 6 (six) hours as needed for mild pain   aspirin 81 MG tablet Not Taking at Unknown time Outside Facility (Specify) Yes No   Sig: Take 81 mg by mouth daily   cholecalciferol (VITAMIN D3) 1,000 units tablet 2/25/2020 at 1000  Yes Yes   Sig: Take 1,000 Units by mouth daily   cyanocobalamin 1000 MCG tablet 2/26/2020 Outside Facility ([address] [place] [address]) Yes Yes   Sig: Take 600 mcg by mouth daily Takes 2 tabs   docusate sodium (COLACE) 100 mg capsule 2/25/2020 at 1800 Outside Facility (Specify) Yes Yes   Sig: Take 100 mg by mouth 2 (two) times a day   ferrous sulfate 325 (65 Fe) mg tablet 2/25/2020 at 1800 Outside Facility (Specify) Yes Yes   Sig: Take 325 mg by mouth 2 (two) times a day   furosemide (LASIX) 20 mg tablet 2/26/2020 at [address] [place] [address] Rd (Specify) No Yes   Sig: Take 1 tablet (20 mg total) by mouth 3 (three) times a week for 30 days   Patient taking differently: Take 40 mg by mouth daily    omeprazole (PriLOSEC) 20 mg delayed release capsule 2/25/2020 at [address] [place] [address] Rd ([address] [place] [address]) Yes Yes   Sig: Take 20 mg by mouth daily   oxyCODONE-acetaminophen (PERCOCET) 7 5-325 MG per tablet 2/25/2020 at 2200  Yes Yes   Sig: Take 1 tablet by mouth every 12 (twelve) hours as needed for moderate pain   polyethylene glycol (MIRALAX) 17 g packet Past Month at Unknown time Outside Facility (Specify) Yes Yes   Sig: Take 17 g by mouth daily   senna-docusate sodium (SENOKOT-S) 8 6-50 mg per tablet Past Month at Unknown time Outside Facility (Specify) Yes Yes   Sig: Take 1 tablet by mouth as needed for constipation   sodium chloride (CHELI 128) 2 % hypertonic ophthalmic solution 2/25/2020 at 2200 Outside Facility (Specify) Yes Yes   Sig: Administer 1 drop to the right eye as needed for irritation      Facility-Administered Medications: None       Allergies: Allergies   Allergen Reactions    Amoxicillin     Penicillins Hives   But appears to have received and tolerated cephalosporin in the past during prior hospitalizations    History:     Marital Status:      Substance Use History:   Social History     Substance and Sexual Activity   Alcohol Use Never    Frequency: Never    Binge frequency: Never    Comment: social     Social History     Tobacco Use   Smoking Status Never Smoker   Smokeless Tobacco Never Used     Social History Substance and Sexual Activity   Drug Use Never       Family History:    Family History   Problem Relation Age of Onset    No Known Problems Mother     No Known Problems Father     No Known Problems Sister     No Known Problems Brother        Physical Exam:     Vitals:   Blood Pressure: 134/63 (02/26/20 1417)  Pulse: 66 (02/26/20 1417)  Temperature: 98 4 °F (36 9 °C) (02/26/20 1417)  Temp Source: Oral (02/26/20 1417)  Respirations: 12 (02/26/20 1417)  Height: 5' 4" (162 6 cm) (02/26/20 0709)  Weight - Scale: 77 6 kg (171 lb) (02/26/20 0709)  SpO2: 99 % (02/26/20 1417)    Physical Exam   Constitutional: No distress  HENT:   Head: Normocephalic and atraumatic  Eyes: Pupils are equal, round, and reactive to light  Neck: Neck supple  Cardiovascular: Normal rate and regular rhythm  Murmur heard  Pacemaker in place   Pulmonary/Chest: Effort normal and breath sounds normal  No respiratory distress  She has no wheezes  She has no rales  Abdominal: Soft  Bowel sounds are normal  She exhibits no distension  There is no tenderness  There is no rebound and no guarding  Genitourinary:   Genitourinary Comments: Connelly catheter in place with pink-tinged urine   Musculoskeletal: She exhibits no edema  Neurological: She is alert  No cranial nerve deficit  GCS eye subscore is 4  GCS verbal subscore is 5  GCS motor subscore is 6  Skin: Skin is warm and dry  No rash noted  Psychiatric: She has a normal mood and affect  Lab Results:  I Have Reviewed All Lab Data Below:    Results from last 7 days   Lab Units 02/26/20  1243   WBC Thousand/uL 9 28   HEMOGLOBIN g/dL 7 6*   HEMATOCRIT % 24 5*   PLATELETS Thousands/uL 230   NEUTROS PCT % 79*   LYMPHS PCT % 11*   MONOS PCT % 8   EOS PCT % 1     Results from last 7 days   Lab Units 02/26/20  1243   POTASSIUM mmol/L 3 9   CHLORIDE mmol/L 99*   CO2 mmol/L 30   BUN mg/dL 43*   CREATININE mg/dL 1 63*   CALCIUM mg/dL 8 0*           * Additional Pertinent Lab Tests Reviewed: All Labs Within Last 24 Hours Reviewed    Imaging: I have personally reviewed pertinent reports  No results found  Hospital Problem List:     Principal Problem:    Anemia  Active Problems:    Bladder mass    Chronic atrial fibrillation    Chronic diastolic CHF (congestive heart failure) ([facility])    Stage 3 chronic kidney disease ([facility])    Benign hypertension with chronic kidney disease, stage III ([facility])      Assessment/Plan    Bladder mass  Assessment & Plan  Status post biopsy in the past, which revealed high-grade superficial transitional cell carcinoma  Status post extensive TURBT of large blood tumor and evacuation of the large blood clot today  Patient denies any pain, Connelly catheter with pink-tinged urine  Continue care as per urology  Patient is on levofloxacin for perioperative prophylaxis with by Urology  Will change to ceftriaxone to avoid fluoroquinolones  Monitor H&H    * Anemia  Assessment & Plan  Patient history of chronic anemia secondary to CKD  Postoperatively hemoglobin noted to be 7 6 g per dL  Estimated blood loss was around 200 cc as per my discussion with the primary team  Will trend hemoglobin, will transfuse if hemoglobin stays below 7 5 grams/deciliter  Monitor hematuria and H&H    Chronic diastolic CHF (congestive heart failure) ([facility])  Assessment & Plan  Wt Readings from Last 3 Encounters:   02/26/20 77 6 kg (171 lb)   12/10/19 77 7 kg (171 lb 6 4 oz)   09/12/19 79 4 kg (175 lb)     Clinically appears euvolemic  Echo-2017-EF 52%, grade 2 diastolic dysfunction  Monitor intake output  Hold Lasix today, likely resume in a m            Chronic atrial fibrillation  Assessment & Plan  History of sick sinus syndrome chronic AFib status post pacemaker placement  Resume metoprolol  Holding anticoagulation due to hematuria for now, resume as per primary team    Stage 3 chronic kidney disease ([facility] [address] [address] )  Assessment & Plan  Baseline creatinine appears to be around 1 5, previous baseline around 1 71 8  Creatinine 1 63 on presentation  Hold diuretics today  Monitor intake and output  Avoid nephrotoxins      Benign hypertension with chronic kidney disease, stage III ([address] )  Assessment & Plan  Blood pressure is stable  Resume metoprolol  Holding amlodipine for now  Holding anticoagulation until cleared by Urology      VTE Prophylaxis: Reason for no pharmacologic prophylaxis Hematuria  / sequential compression device       Counseling / Coordination of Care Time: 45 minutes  Greater than 50% of total time spent on patient counseling and coordination of care  Collaboration of Care: Were Recommendations Directly Discussed with Primary Treatment Team? - Yes     ** Please Note: "This note has been constructed using a voice recognition system  Therefore there may be syntax, spelling, and/or grammatical errors   Please call if you have any questions  "**

## 2020-02-26 NOTE — PERIOPERATIVE NURSING NOTE
Went to waiting room to look for patient's family  There was none one in the waiting room for the patient

## 2020-02-26 NOTE — PLAN OF CARE
Problem: Prexisting or High Potential for Compromised Skin Integrity  Goal: Skin integrity is maintained or improved  Description  INTERVENTIONS:  - Identify patients at risk for skin breakdown  - Assess and monitor skin integrity  - Assess and monitor nutrition and hydration status  - Monitor labs   - Assess for incontinence   - Turn and reposition patient  - Assist with mobility/ambulation  - Relieve pressure over bony prominences  - Avoid friction and shearing  - Provide appropriate hygiene as needed including keeping skin clean and dry  - Evaluate need for skin moisturizer/barrier cream  - Collaborate with interdisciplinary team   - Patient/family teaching  - Consider wound care consult   Outcome: Progressing     Problem: Potential for Falls  Goal: Patient will remain free of falls  Description  INTERVENTIONS:  - Assess patient frequently for physical needs  -  Identify cognitive and physical deficits and behaviors that affect risk of falls    -  [place] fall precautions as indicated by assessment   - Educate patient/family on patient safety including physical limitations  - Instruct patient to call for assistance with activity based on assessment  - Modify environment to reduce risk of injury  - Consider OT/PT consult to assist with strengthening/mobility  Outcome: Progressing     Problem: GENITOURINARY - ADULT  Goal: Maintains or returns to baseline urinary function  Description  INTERVENTIONS:  - Assess urinary function  - Encourage oral fluids to ensure adequate hydration if ordered  - Administer IV fluids as ordered to ensure adequate hydration  - Administer ordered medications as needed  - Offer frequent toileting  - Follow urinary retention protocol if ordered  Outcome: Progressing  Goal: Absence of urinary retention  Description  INTERVENTIONS:  - Assess patient's ability to void and empty bladder  - Monitor I/O  - Bladder scan as needed  - Discuss with physician/AP medications to alleviate retention as needed  - Discuss catheterization for long term situations as appropriate   Outcome: Progressing  Goal: Urinary catheter remains patent  Description  INTERVENTIONS:  - Assess patency of urinary catheter  - If patient has a chronic bauer, consider changing catheter if non-functioning  - Follow guidelines for intermittent irrigation of non-functioning urinary catheter  Outcome: Progressing     Problem: HEMATOLOGIC - ADULT  Goal: Maintains hematologic stability  Description  INTERVENTIONS  - Assess for signs and symptoms of bleeding or hemorrhage  - Monitor labs  - Administer supportive blood products/factors as ordered and appropriate  Outcome: Progressing

## 2020-02-26 NOTE — OP NOTE
OPERATIVE REPORT  PATIENT NAME: José Muller    :  1928  MRN: [id number]  Pt Location: [facility] OR ROOM 04    SURGERY DATE: 2020    Surgeon(s) and Role:     * Rosita Swanson MD - Primary    Preop Diagnos  Large bladder tumor gross hematuria    Post-Op Diagnosis Codes:  Large bladder tumor/blood clots    Procedure  Cysto evacuation of clots left retrograde pyelogram TURBT large greater than 8 cm    Specimen(s):  ID Type Source Tests Collected by Time Destination   1 : Bladder Tumor Tissue Urinary Bladder TISSUE EXAM Rosita Swanson MD 2020 1134    A :  Urine Urine, Cystoscopic URINE CULTURE Rosita Swanson MD 2020 [id number]        Estimated Blood Loss:   Minimal    Drains:  Urethral Catheter Three way 24 Fr  (Active)   Number of days: 0       Anesthesia Type:   General    Operative Indications:  Malignant neoplasm of bladder, unspecified ([facility] [address] ) [C67 9]  This 25-year-old female was met on consultation at [facility] after presenting from local nursing home on [facility] with episodic gross hematuria for years under on admitting CT scan to have a large bladder tumor extending across the entire right bladder wall dome and posterior wall  Patient was put in the ICU transfused multiple units hand irrigated multiple times and taken to the OR undergoing aggressive cysto fulguration removal or clots attempts at removing entire tumor was aborted due to instrumentations  Limitation patient now presents from local nursing home with episodic hematuria to undergo cysto TURBT of remaining tumor    Operative Findings:  Large bladder tumor extending over the entire right trigone lateral wall dome extending posteriorly and towards bladder neck    Extensive TURBT of the tumor with evacuation of large clots left retrograde confirms normal filling and drainage right orifice could not be found    Complications:   None    Procedure and Technique:  After patient identified in the holding area consent obtained from daughter via phone with other cysto present she was placed in the op suite  After anesthesia was induced she was placed thigh position draped prep standard fashion time-out performed  Twenty-two [place] cystoscope with 30 degree lens was passed through to the bladder  Organized clots noted with a large tumor noted evacuation of organized clots with the Ellik performed on view of the bladder only the left orifice could be found 5 [place] catheter placed left retrograde pyelogram confirmed normal filling and drainage  Resect to scope 32 [place] placed and resection of the tumor was started at the right mid trigone bladder floor extending upward laterally and superiorly extensive bleeding from extremely friable tumor noted after resecting of the tumor constantly fulgurated with Ellik evacuation of the chips on view from the bladder neck tumor was taken down the tire floor lateral walls and towards the dome    The scope was removed [address] Connelly catheter inserted left to bag drainage time of dictation CBI is clear postop procedure was awakened taken recovery room stable condition discussed with cysto postop   I was present for the entire procedure    Patient Disposition:  PACU     SIGNATURE: Sukh Whitaker MD  DATE: February 26, 2020  TIME: 12:21 PM

## 2020-02-27 VITALS
HEIGHT: 64 IN | TEMPERATURE: 98.5 F | RESPIRATION RATE: 12 BRPM | OXYGEN SATURATION: 96 % | BODY MASS INDEX: 29.19 KG/M2 | WEIGHT: 171 LBS | HEART RATE: 60 BPM | SYSTOLIC BLOOD PRESSURE: 132 MMHG | DIASTOLIC BLOOD PRESSURE: 62 MMHG

## 2020-02-27 LAB
ABO GROUP BLD BPU: NORMAL
ANION GAP SERPL CALCULATED.3IONS-SCNC: 6 MMOL/L (ref 4–13)
BASOPHILS # BLD AUTO: 0.03 THOUSANDS/ΜL (ref 0–0.1)
BASOPHILS NFR BLD AUTO: 0 % (ref 0–1)
BPU ID: NORMAL
BUN SERPL-MCNC: 41 MG/DL (ref 5–25)
CALCIUM SERPL-MCNC: 7.5 MG/DL (ref 8.3–10.1)
CHLORIDE SERPL-SCNC: 98 MMOL/L (ref 100–108)
CO2 SERPL-SCNC: 30 MMOL/L (ref 21–32)
CREAT SERPL-MCNC: 1.67 MG/DL (ref 0.6–1.3)
CROSSMATCH: NORMAL
EOSINOPHIL # BLD AUTO: 0.07 THOUSAND/ΜL (ref 0–0.61)
EOSINOPHIL NFR BLD AUTO: 1 % (ref 0–6)
ERYTHROCYTE [DISTWIDTH] IN BLOOD BY AUTOMATED COUNT: 14.5 % (ref 11.6–15.1)
GFR SERPL CREATININE-BSD FRML MDRD: 26 ML/MIN/1.73SQ M
GLUCOSE P FAST SERPL-MCNC: 87 MG/DL (ref 65–99)
GLUCOSE SERPL-MCNC: 87 MG/DL (ref 65–140)
HCT VFR BLD AUTO: 25.6 % (ref 34.8–46.1)
HGB BLD-MCNC: 7.9 G/DL (ref 11.5–15.4)
IMM GRANULOCYTES # BLD AUTO: 0.07 THOUSAND/UL (ref 0–0.2)
IMM GRANULOCYTES NFR BLD AUTO: 1 % (ref 0–2)
LYMPHOCYTES # BLD AUTO: 0.87 THOUSANDS/ΜL (ref 0.6–4.47)
LYMPHOCYTES NFR BLD AUTO: 10 % (ref 14–44)
MCH RBC QN AUTO: 27.6 PG (ref 26.8–34.3)
MCHC RBC AUTO-ENTMCNC: 30.9 G/DL (ref 31.4–37.4)
MCV RBC AUTO: 90 FL (ref 82–98)
MONOCYTES # BLD AUTO: 0.83 THOUSAND/ΜL (ref 0.17–1.22)
MONOCYTES NFR BLD AUTO: 9 % (ref 4–12)
NEUTROPHILS # BLD AUTO: 6.95 THOUSANDS/ΜL (ref 1.85–7.62)
NEUTS SEG NFR BLD AUTO: 79 % (ref 43–75)
NRBC BLD AUTO-RTO: 0 /100 WBCS
PLATELET # BLD AUTO: 201 THOUSANDS/UL (ref 149–390)
PMV BLD AUTO: 9.4 FL (ref 8.9–12.7)
POTASSIUM SERPL-SCNC: 4 MMOL/L (ref 3.5–5.3)
RBC # BLD AUTO: 2.86 MILLION/UL (ref 3.81–5.12)
SODIUM SERPL-SCNC: 134 MMOL/L (ref 136–145)
UNIT DISPENSE STATUS: NORMAL
UNIT PRODUCT CODE: NORMAL
UNIT RH: NORMAL
WBC # BLD AUTO: 8.82 THOUSAND/UL (ref 4.31–10.16)

## 2020-02-27 PROCEDURE — 85025 COMPLETE CBC W/AUTO DIFF WBC: CPT | Performed by: INTERNAL MEDICINE

## 2020-02-27 PROCEDURE — 99225 PR SBSQ OBSERVATION CARE/DAY 25 MINUTES: CPT | Performed by: INTERNAL MEDICINE

## 2020-02-27 PROCEDURE — 80048 BASIC METABOLIC PNL TOTAL CA: CPT | Performed by: INTERNAL MEDICINE

## 2020-02-27 RX ORDER — METOPROLOL TARTRATE 50 MG/1
50 TABLET, FILM COATED ORAL EVERY 12 HOURS SCHEDULED
Refills: 0
Start: 2020-02-27

## 2020-02-27 RX ADMIN — ASPIRIN 81 MG 81 MG: 81 TABLET ORAL at 09:13

## 2020-02-27 RX ADMIN — CEFTRIAXONE 1000 MG: 1 INJECTION, SOLUTION INTRAVENOUS at 09:16

## 2020-02-27 RX ADMIN — DOCUSATE SODIUM 100 MG: 100 CAPSULE, LIQUID FILLED ORAL at 09:13

## 2020-02-27 RX ADMIN — PANTOPRAZOLE SODIUM 20 MG: 20 TABLET, DELAYED RELEASE ORAL at 05:02

## 2020-02-27 RX ADMIN — METOPROLOL TARTRATE 50 MG: 50 TABLET, FILM COATED ORAL at 09:12

## 2020-02-27 RX ADMIN — CYANOCOBALAMIN TAB 500 MCG 600 MCG: 500 TAB at 09:12

## 2020-02-27 RX ADMIN — VITAMIN D, TAB 1000IU (100/BT) 1000 UNITS: 25 TAB at 09:12

## 2020-02-27 RX ADMIN — POLYETHYLENE GLYCOL 3350 17 G: 17 POWDER, FOR SOLUTION ORAL at 09:12

## 2020-02-27 RX ADMIN — FERROUS SULFATE TAB 325 MG (65 MG ELEMENTAL FE) 325 MG: 325 (65 FE) TAB at 09:12

## 2020-02-27 NOTE — ASSESSMENT & PLAN NOTE
Wt Readings from Last 3 Encounters:   02/26/20 77 6 kg (171 lb)   12/10/19 77 7 kg (171 lb 6 4 oz)   09/12/19 79 4 kg (175 lb)     Clinically appears euvolemic  Echo-2017-EF 41%, grade 2 diastolic dysfunction  Monitor intake output  Resume Lasix on discharge

## 2020-02-27 NOTE — ASSESSMENT & PLAN NOTE
Status post biopsy in the past, which revealed high-grade superficial transitional cell carcinoma  Status post extensive TURBT of large blood tumor and evacuation of the large blood clot today  Continue care as per urology

## 2020-02-27 NOTE — ASSESSMENT & PLAN NOTE
History of sick sinus syndrome chronic AFib status post pacemaker placement  Resume metoprolol  Held anticoagulation due to hematuria for now, resume as per primary team

## 2020-02-27 NOTE — PROGRESS NOTES
Progress Note - Urology      Patient: Sanjay White   : 1928 Sex: female   MRN: [id number]     CSN: [id number]  Unit/Bed#: [address] [place] [address]     SUBJECTIVE:   Sleeping no complaints      Objective   Vitals: /62 (BP Location: Left arm)   Pulse 60   Temp 98 5 °F (36 9 °C) (Oral)   Resp 12   Ht 5' 4" (1 626 m)   Wt 77 6 kg (171 lb)   SpO2 96%   BMI 29 35 kg/m²     I/O last 24 hours:   In: 1830 [P O :480; I V :900; Blood:350; IV Piggyback:100]  Out: 1600 [Urine:1600]      Physical Exam:   General Alert awake   Normocephalic atraumatic PERRLA  Lungs clear bilaterally  Cardiac normal S1 normal S2  Abdomen soft, flank pain  Extremities no edema      Lab Results: CBC:   Lab Results   Component Value Date    WBC 8 82 2020    HGB 7 9 (L) 2020    HCT 25 6 (L) 2020    MCV 90 2020     2020    MCH 27 6 2020    MCHC 30 9 (L) 2020    RDW 14 5 2020    MPV 9 4 2020    NRBC 0 2020     CMP:   Lab Results   Component Value Date     (L) 2017    CL 98 (L) 2020     2017    CO2 30 2020    CO2 22 2017    ANIONGAP 7 2015    BUN 41 (H) 2020    BUN 31 (H) 2017    CREATININE 1 67 (H) 2020    CREATININE 1 37 (H) 2017    GLUCOSE 101 2015    CALCIUM 7 5 (L) 2020    CALCIUM 8 3 (L) 2017    AST 15 2017    ALT 12 2017    ALKPHOS 112 2017    PROT 6 3 2017    BILITOT 1 4 (H) 2017    EGFR 26 2020     Urinalysis:   Lab Results   Component Value Date    COLORU Yellow 2015    CLARITYU Clear 2015    SPECGRAV 1 016 2019    SPECGRAV 1 020 2015    PHUR 5 0 2019    PHUR 5 5 2015    LEUKOCYTESUR Moderate (A) 2019    LEUKOCYTESUR Negative 2015    NITRITE Negative 2019    NITRITE Negative 2015    PROTEINUA Negative 2015    GLUCOSEU Negative 2019    GLUCOSEU Negative 2015 KETONESU Negative 07/06/2019    KETONESU Trace (A) 11/11/2015    BILIRUBINUR Negative 11/11/2015    BLOODU >=1000 (1%) 07/06/2019    BLOODU Negative 11/11/2015     Urine Culture: No results found for: URINECX  PSA: No results found for: PSA      Assessment/ Plan:  Status post cysto TURBT left retrograde  Urine clear  DC back to nursing home with Connelly catheter removed a few days          Felicia Tabor MD

## 2020-02-27 NOTE — NURSING NOTE
Patient left via personal wheelchair from [facility]  IV access removed prior to discharge  IV acces removed  Connelly patent and irrigated per order  Connelly output pink/yellow/clear urine  All personal belongings taken with patient  Family aware of transport  Discharge instructions reviewed with receiving facility  Discharge packet given to transporter

## 2020-02-27 NOTE — DISCHARGE INSTRUCTIONS
#1 no heavy straining or lifting above 10 pounds for 2 weeks    #2 call office fevers, chills, or worsening blood in the urine  #3 Patient returning to nursing home apparently being switched over to hospice will continue Connelly catheter at this time and removed next week patient to follow up in office as per family's wishes      Melvin PATE  [address] [place] [address] office  [address] [place] [address] 6  [phone]  8:30 AM to 4:30 PM  Monday through Friday    [place] office  [phone]  [place] [address]  [place], [address] [place] [address]  [phone]  1:00 to 5:00 PM  Wednesday

## 2020-02-27 NOTE — SOCIAL WORK
Discharge ordered  Plan is for patient to return to [facility]  Transportation arranged for 1:00 pm with MANUEL GALLEGOS  Facility, family, nurse all made aware

## 2020-02-27 NOTE — ASSESSMENT & PLAN NOTE
Blood pressure is stable  Resume meds on discharge  Holding anticoagulation until cleared by Urology

## 2020-02-27 NOTE — ASSESSMENT & PLAN NOTE
Baseline creatinine appears to be around 1 5, previous baseline around 1 71 8  Creatinine 1 63 on presentation  Follow-up BMP as outpatient  Monitor intake and output  Avoid nephrotoxins

## 2020-02-27 NOTE — PROGRESS NOTES
[facility] Internal Medicine Progress Note  Patient: José Muller 80 y o  female   MRN: [id number]  PCP: Sindi Olson MD  Unit/Bed#: [address] [place] [address] Encounter: [id number]  Date Of Visit: 02/27/20    Problem List:    Principal Problem:    Anemia  Active Problems:    Bladder mass    Chronic atrial fibrillation    Chronic diastolic CHF (congestive heart failure) ([facility] )    Stage 3 chronic kidney disease ([facility] )    Benign hypertension with chronic kidney disease, stage III ([facility] )      Assessment & Plan:    Bladder mass  Assessment & Plan  Status post biopsy in the past, which revealed high-grade superficial transitional cell carcinoma  Status post extensive TURBT of large blood tumor and evacuation of the large blood clot today  Continue care as per urology    * Anemia  Assessment & Plan  Patient history of chronic anemia secondary to CKD  Postoperatively hemoglobin noted to be 7 6 g per dL  Estimated blood loss was around 200 cc as per my discussion with the primary team  Status post 1 PRBC , hemoglobin 7 9 grams/deciliters today  No further hematuria  Follow-up CBC on discharge    Chronic diastolic CHF (congestive heart failure) ([facility] )  Assessment & Plan  Wt Readings from Last 3 Encounters:   02/26/20 77 6 kg (171 lb)   12/10/19 77 7 kg (171 lb 6 4 oz)   09/12/19 79 4 kg (175 lb)     Clinically appears euvolemic  Echo-2017-EF 22%, grade 2 diastolic dysfunction  Monitor intake output  Resume Lasix on discharge          Chronic atrial fibrillation  Assessment & Plan  History of sick sinus syndrome chronic AFib status post pacemaker placement  Resume metoprolol  Held anticoagulation due to hematuria for now, resume as per primary team    Stage 3 chronic kidney disease ([facility] )  Assessment & Plan  Baseline creatinine appears to be around 1 5, previous baseline around 1 71 8  Creatinine 1 63 on presentation  Follow-up BMP as outpatient  Monitor intake and output  Avoid nephrotoxins      Benign hypertension with chronic kidney disease, stage III ([facility] )  Assessment & Plan  Blood pressure is stable  Resume meds on discharge  Holding anticoagulation until cleared by Urology          Patient Centered Rounds: I have performed bedside rounds with nursing staff today  Discussions with Specialists or Other Care Team Provider:  Yes      Time Spent for Care: 30 minutes  More than 50% of total time spent on counseling and coordination of care as described above  Current Length of Stay: 0 day(s)    Current Patient Status: Observation       Subjective:   Comfortably lying in bed  Tolerated 1 unit of PRBC overnight  Hematuria has improved  Denies chest pain or shortness of breath  Awaiting discharge skilled nursing facility    Objective:     Vitals:   Temp (24hrs), Av 3 °F (36 8 °C), Min:98 1 °F (36 7 °C), Max:98 6 °F (37 °C)    Temp:  [98 1 °F (36 7 °C)-98 6 °F (37 °C)] 98 5 °F (36 9 °C)  HR:  [60-66] 60  Resp:  [11-16] 12  BP: (111-132)/(56-68) 132/62  SpO2:  [95 %-97 %] 96 %  Body mass index is 29 35 kg/m²  Input and Output Summary (last 24 hours): Intake/Output Summary (Last 24 hours) at 2020 1623  Last data filed at 2020 0751  Gross per 24 hour   Intake 830 ml   Output 1250 ml   Net -420 ml       Physical Exam:     Physical Exam   Constitutional: No distress  HENT:   Head: Normocephalic and atraumatic  Eyes: Pupils are equal, round, and reactive to light  Neck: Neck supple  Cardiovascular: Normal rate and regular rhythm  Murmur heard  Pacemaker in place   Pulmonary/Chest: Effort normal and breath sounds normal  No respiratory distress  She has no wheezes  She has no rales  Abdominal: Soft  Bowel sounds are normal  She exhibits no distension  There is no tenderness  There is no rebound and no guarding  Genitourinary:   Genitourinary Comments: Connelly catheter in place   Musculoskeletal: She exhibits no edema  Neurological: She is alert  No cranial nerve deficit  Skin: Skin is warm and dry   No rash noted  Additional Data:     Labs:    Results from last 7 days   Lab Units 02/27/20  0523   WBC Thousand/uL 8 82   HEMOGLOBIN g/dL 7 9*   HEMATOCRIT % 25 6*   PLATELETS Thousands/uL 201   NEUTROS PCT % 79*   LYMPHS PCT % 10*   MONOS PCT % 9   EOS PCT % 1     Results from last 7 days   Lab Units 02/27/20  0523   POTASSIUM mmol/L 4 0   CHLORIDE mmol/L 98*   CO2 mmol/L 30   BUN mg/dL 41*   CREATININE mg/dL 1 67*   CALCIUM mg/dL 7 5*           * I Have Reviewed All Lab Data Listed Above  * Additional Pertinent Lab Tests Reviewed: All Labs Within Last 24 Hours Reviewed      Imaging:  Imaging Reports Reviewed Today Include:  Not applicable    Recent Cultures (last 7 days):     Results from last 7 days   Lab Units 02/26/20  1027   URINE CULTURE  Culture results to follow  Last 24 Hours Medication List:          Today, Patient Was Seen By: Felipe Colon MD    ** Please Note: "This note has been constructed using a voice recognition system  Therefore there may be syntax, spelling, and/or grammatical errors   Please call if you have any questions  "**

## 2020-02-27 NOTE — ASSESSMENT & PLAN NOTE
Patient history of chronic anemia secondary to CKD  Postoperatively hemoglobin noted to be 7 6 g per dL  Estimated blood loss was around 200 cc as per my discussion with the primary team  Status post 1 PRBC , hemoglobin 7 9 grams/deciliters today  No further hematuria  Follow-up CBC on discharge

## 2020-02-27 NOTE — UTILIZATION REVIEW
Initial Clinical Review    Elective outpatient surgical procedure  Age/Sex: 80 y o  female  Surgery Date: 2/26/20  Procedure: Cysto evacuation of clots left retrograde pyelogram TURBT large greater than 8 cm  Anesthesia: general  Operative Findings: Large bladder tumor extending over the entire right trigone lateral wall dome extending posteriorly and towards bladder neck  Extensive TURBT of the tumor with evacuation of large clots left retrograde confirms normal filling and drainage right orifice could not be found  POD#1 Progress Note:   D/c home today  Admission Orders: Date/Time/Statement: Admission Orders (From admission, onward)     Ordered        02/26/20 1258  Place in Observation  Once                   Orders Placed This Encounter   Procedures    Place in Observation     Standing Status:   Standing     Number of Occurrences:   1     Order Specific Question:   Admitting Physician     Answer:   Ann Mora     Order Specific Question:   Level of Care     Answer:   Med Surg [16]     Vital Signs: /62 (BP Location: Left arm)   Pulse 60   Temp 98 5 °F (36 9 °C) (Oral)   Resp 12   Ht 5' 4" (1 626 m)   Wt 77 6 kg (171 lb)   SpO2 96%   BMI 29 35 kg/m²   Admission orders:  Consult internal medicine  Irrigate 3-way bauer cath: With 60ml NS every 4 hours and prn until clear  Transfuse 1uprbc's  Diet: regular  Mobility: as tolerated  DVT Prophylaxis: scd's  Medications/Pain Control:   aspirin 81 mg Oral Daily   cefTRIAXone 1,000 mg Intravenous Q24H   cholecalciferol 1,000 Units Oral Daily   cyanocobalamin 600 mcg Oral Daily   docusate sodium 100 mg Oral BID   ferrous sulfate 325 mg Oral BID   metoprolol tartrate 50 mg Oral Q12H LEW   pantoprazole 20 mg Oral Early Morning   polyethylene glycol 17 g Oral Daily     Continuous IV Infusions:  lactated ringers infusion   Rate: 125 mL/hr Dose: 125 mL/hr  Freq: Continuous Route: IV  Last Dose: Stopped (02/26/20 1908)  Start: 02/26/20 0730 End: 02/26/20 1220    PRN Meds:  acetaminophen 488 mg Oral Q6H PRN   aluminum-magnesium hydroxide-simethicone 30 mL Oral Q6H PRN   ondansetron 4 mg Intravenous Q6H PRN   oxyCODONE-acetaminophen 1 tablet Oral Q6H PRN     Network Utilization Review Department  [email or web address]  ATTENTION: Please call with any questions or concerns to [phone] and carefully listen to the prompts so that you are directed to the right person  All voicemails are confidential   Maeve Lorenzo all requests for admission clinical reviews, approved or denied determinations and any other requests to dedicated fax number below belonging to the campus where the patient is receiving treatment   List of dedicated fax numbers for the Facilities:  [address] DENIALS (Administrative/Medical Necessity) [phone]   [address] (Maternity/NICU/Pediatrics) [phone]   Deepika Herbert [phone]   Oscar tom [phone]   Paz Pineda [phone]   Merlinda Downs [phone]   [place] [address] [facility] [phone]   [facility]  [phone]   [address]5 [facility]  2401 [facility] 1000 [facility] [phone]

## 2020-02-27 NOTE — PLAN OF CARE
Problem: Prexisting or High Potential for Compromised Skin Integrity  Goal: Skin integrity is maintained or improved  Description  INTERVENTIONS:  - Identify patients at risk for skin breakdown  - Assess and monitor skin integrity  - Assess and monitor nutrition and hydration status  - Monitor labs   - Assess for incontinence   - Turn and reposition patient  - Assist with mobility/ambulation  - Relieve pressure over bony prominences  - Avoid friction and shearing  - Provide appropriate hygiene as needed including keeping skin clean and dry  - Evaluate need for skin moisturizer/barrier cream  - Collaborate with interdisciplinary team   - Patient/family teaching  - Consider wound care consult   Outcome: Progressing     Problem: Potential for Falls  Goal: Patient will remain free of falls  Description  INTERVENTIONS:  - Assess patient frequently for physical needs  -  Identify cognitive and physical deficits and behaviors that affect risk of falls    -  [place] fall precautions as indicated by assessment   - Educate patient/family on patient safety including physical limitations  - Instruct patient to call for assistance with activity based on assessment  - Modify environment to reduce risk of injury  - Consider OT/PT consult to assist with strengthening/mobility  Outcome: Progressing     Problem: GENITOURINARY - ADULT  Goal: Maintains or returns to baseline urinary function  Description  INTERVENTIONS:  - Assess urinary function  - Encourage oral fluids to ensure adequate hydration if ordered  - Administer IV fluids as ordered to ensure adequate hydration  - Administer ordered medications as needed  - Offer frequent toileting  - Follow urinary retention protocol if ordered  Outcome: Progressing  Goal: Absence of urinary retention  Description  INTERVENTIONS:  - Assess patient's ability to void and empty bladder  - Monitor I/O  - Bladder scan as needed  - Discuss with physician/AP medications to alleviate retention as needed  - Discuss catheterization for long term situations as appropriate   Outcome: Progressing  Goal: Urinary catheter remains patent  Description  INTERVENTIONS:  - Assess patency of urinary catheter  - If patient has a chronic bauer, consider changing catheter if non-functioning  - Follow guidelines for intermittent irrigation of non-functioning urinary catheter  Outcome: Progressing     Problem: HEMATOLOGIC - ADULT  Goal: Maintains hematologic stability  Description  INTERVENTIONS  - Assess for signs and symptoms of bleeding or hemorrhage  - Monitor labs  - Administer supportive blood products/factors as ordered and appropriate  Outcome: Progressing

## 2020-02-28 LAB — BACTERIA UR CULT: ABNORMAL

## 2020-02-28 NOTE — UTILIZATION REVIEW
Notification of Observation Care Status/Observation Authorization Request  This is a Notification of Observation Care Status to our facility [address] [place] [address]  Please be advised that this patient is currently in our facility under Observation Status  Below you will find the Attending Physician and Facilitys information including NPI# and contact information for the Utilization  assigned to the [facility] where the patient is receiving services  Please feel free to contact the Utilization Review Department with any questions  Place of Service Code: 25  Place of Service Name: On [facility]  CPT Code for Observation:   HOURS IN OBSERVATION STATUS: 31 hours    PRESENTATION DATE/TIME: 2/26/2020  6:39 AM  OBS ADMISSION DATE/TIME:  Admission Orders (From admission, onward)     Ordered        02/26/20 1258  Place in Observation  Once                   DISCHARGE DATE/TIME: 2/27/2020  2:07 PM   DISPOSITION: Home/Self Care  Attending Physician: Lucía Cross Md [[id number]] [unfilled]    Facility: [address] [place] [address] Utilization Review Department  Phone: [phone]; Fax [phone]  [email or web address]  ATTENTION: Please call with any questions or concerns to [phone]  and carefully listen to the prompts so that you are directed to the right person  Send all requests for admission clinical reviews, approved or denied determinations and any other requests to fax [phone]  All voicemails are confidential   Initial Clinical Review    Elective outpatient surgical procedure  Age/Sex: 80 y o  female  Surgery Date: 2/26/20  Procedure: Cysto evacuation of clots left retrograde pyelogram TURBT large greater than 8 cm  Anesthesia: general  Operative Findings: Large bladder tumor extending over the entire right trigone lateral wall dome extending posteriorly and towards bladder neck    Extensive TURBT of the tumor with evacuation of large clots left retrograde confirms normal filling and drainage right orifice could not be found  POD#1 Progress Note:   D/c home today  Admission Orders: Date/Time/Statement:   Admission Orders (From admission, onward)     Ordered        02/26/20 1258  Place in Observation  Once                   Orders Placed This Encounter   Procedures    Place in Observation     Standing Status:   Standing     Number of Occurrences:   1     Order Specific Question:   Admitting Physician     Answer:   Ganesh Benedict     Order Specific Question:   Level of Care     Answer:   Med Surg [16]     Vital Signs: /62 (BP Location: Left arm)   Pulse 60   Temp 98 5 °F (36 9 °C) (Oral)   Resp 12   Ht 5' 4" (1 626 m)   Wt 77 6 kg (171 lb)   SpO2 96%   BMI 29 35 kg/m²   Admission orders:  Consult internal medicine  Irrigate 3-way bauer cath: With 60ml NS every 4 hours and prn until clear  Transfuse 1uprbc's  Diet: regular  Mobility: as tolerated  DVT Prophylaxis: scd's  Medications/Pain Control:   aspirin 81 mg Oral Daily   cefTRIAXone 1,000 mg Intravenous Q24H   cholecalciferol 1,000 Units Oral Daily   cyanocobalamin 600 mcg Oral Daily   docusate sodium 100 mg Oral BID   ferrous sulfate 325 mg Oral BID   metoprolol tartrate 50 mg Oral Q12H LEW   pantoprazole 20 mg Oral Early Morning   polyethylene glycol 17 g Oral Daily     Continuous IV Infusions:  lactated ringers infusion   Rate: 125 mL/hr Dose: 125 mL/hr  Freq: Continuous Route: IV  Last Dose: Stopped (02/26/20 1908)  Start: 02/26/20 0730 End: 02/26/20 1220    PRN Meds:  acetaminophen 488 mg Oral Q6H PRN   aluminum-magnesium hydroxide-simethicone 30 mL Oral Q6H PRN   ondansetron 4 mg Intravenous Q6H PRN   oxyCODONE-acetaminophen 1 tablet Oral Q6H PRN     Network Utilization Review Department  [email or web address]  ATTENTION: Please call with any questions or concerns to [phone] and carefully listen to the prompts so that you are directed to the right person  All voicemails are confidential   Joseph Torres all requests for admission clinical reviews, approved or denied determinations and any other requests to dedicated fax number below belonging to the campus where the patient is receiving treatment   List of dedicated fax numbers for the Facilities:  [address] DENIALS (Administrative/Medical Necessity) [phone]   [address] (Maternity/NICU/Pediatrics) [phone]   Marvel Mckeon [phone]   Yi Acuña [phone]   [facility] [phone]   [address] [place] [address]  [phone]   Pamela 988 [facility] [phone]   [facility]  [phone]   2205 [facility]  2401 [facility] 1000 [facility] [phone]

## 2020-04-17 DIAGNOSIS — G89.29 OTHER CHRONIC PAIN: Primary | ICD-10-CM

## 2020-04-17 DIAGNOSIS — G89.4 CHRONIC PAIN DISORDER: Primary | ICD-10-CM

## 2020-04-17 RX ORDER — OXYCODONE AND ACETAMINOPHEN 7.5; 325 MG/1; MG/1
1 TABLET ORAL 2 TIMES DAILY
Qty: 120 TABLET | Refills: 0 | Status: SHIPPED | OUTPATIENT
Start: 2020-04-17

## 2020-04-17 RX ORDER — OXYCODONE HYDROCHLORIDE AND ACETAMINOPHEN 5; 325 MG/1; MG/1
1.5 TABLET ORAL 2 TIMES DAILY
Qty: 6 TABLET | Refills: 0 | Status: SHIPPED | OUTPATIENT
Start: 2020-04-17

## (undated) DEVICE — LUBRICANT SURGILUBE TUBE 4 OZ  FLIP TOP

## (undated) DEVICE — GLOVE INDICATOR PI UNDERGLOVE SZ 7 BLUE

## (undated) DEVICE — POV-IOD SOLUTION 4OZ BT

## (undated) DEVICE — GLOVE INDICATOR PI UNDERGLOVE SZ 6.5 BLUE

## (undated) DEVICE — CHLORAPREP HI-LITE 26ML ORANGE

## (undated) DEVICE — ABDOMINAL PAD: Brand: DERMACEA

## (undated) DEVICE — CYSTOSCOPY PACK: Brand: CONVERTORS

## (undated) DEVICE — Device

## (undated) DEVICE — INTENDED FOR TISSUE SEPARATION, AND OTHER PROCEDURES THAT REQUIRE A SHARP SURGICAL BLADE TO PUNCTURE OR CUT.: Brand: BARD-PARKER SAFETY BLADES SIZE 15, STERILE

## (undated) DEVICE — CLOSURE DEVICE ENDO CLOSE

## (undated) DEVICE — GLOVE SRG BIOGEL 8

## (undated) DEVICE — ENDOPATH XCEL BLADELESS TROCARS WITH STABILITY SLEEVES: Brand: ENDOPATH XCEL

## (undated) DEVICE — HARMONIC ACE 5MM DIAMETER SHEARS 36CM SHAFT LENGTH + ADAPTIVE TISSUE TECHNOLOGY FOR USE WITH GENERATOR G11: Brand: HARMONIC ACE

## (undated) DEVICE — STANDARD SURGICAL GOWN, L: Brand: CONVERTORS

## (undated) DEVICE — ADHESIVE SKN CLSR HISTOACRYL FLEX 0.5ML LF

## (undated) DEVICE — INTENDED FOR TISSUE SEPARATION, AND OTHER PROCEDURES THAT REQUIRE A SHARP SURGICAL BLADE TO PUNCTURE OR CUT.: Brand: BARD-PARKER SAFETY BLADES SIZE 11, STERILE

## (undated) DEVICE — GAUZE SPONGES,16 PLY: Brand: CURITY

## (undated) DEVICE — RADIOLOGY STERILE LABELS: Brand: CENTURION

## (undated) DEVICE — POUCH UR CATCHER STERILE

## (undated) DEVICE — CYSTO TUBING TUR Y IRRIGATION

## (undated) DEVICE — ANTI-FOG SOLUTION WITH FOAM PAD: Brand: DEVON

## (undated) DEVICE — GROUNDING PAD UNIVERSAL SLW

## (undated) DEVICE — IRRIG ENDO FLO TUBING

## (undated) DEVICE — SYRINGE 10ML LL

## (undated) DEVICE — GLOVE SRG BIOGEL ECLIPSE 7

## (undated) DEVICE — RESECTOSCOPE LOOE ELECTRODE 27040F/6

## (undated) DEVICE — TRAY FOLEY 16FR URIMETER SURESTEP

## (undated) DEVICE — ACCESS PLATFORM FOR MINIMALLY INVASIVE SURGERY.: Brand: GELPORT® LAPAROSCOPIC  SYSTEM

## (undated) DEVICE — URETERAL CATHETER 5 FR CONE TIP

## (undated) DEVICE — EVACUATOR BLADDER ELLIK DISP STRL

## (undated) DEVICE — GLOVE SRG BIOGEL ECLIPSE 6

## (undated) DEVICE — STERILE LAP LITHOTOMY PACK: Brand: CARDINAL HEALTH

## (undated) DEVICE — INSUFLATION TUBING INSUFLOW (LEXION)

## (undated) DEVICE — SUT ETHIBOND 0 CT-2 30 IN X412H